# Patient Record
Sex: MALE | Race: BLACK OR AFRICAN AMERICAN | Employment: OTHER | URBAN - METROPOLITAN AREA
[De-identification: names, ages, dates, MRNs, and addresses within clinical notes are randomized per-mention and may not be internally consistent; named-entity substitution may affect disease eponyms.]

---

## 2018-12-03 ENCOUNTER — INITIAL CONSULT (OUTPATIENT)
Dept: PHYSICAL MEDICINE AND REHAB | Age: 73
End: 2018-12-03
Payer: MEDICARE

## 2018-12-03 VITALS
DIASTOLIC BLOOD PRESSURE: 68 MMHG | SYSTOLIC BLOOD PRESSURE: 147 MMHG | WEIGHT: 218 LBS | HEIGHT: 74 IN | HEART RATE: 71 BPM | BODY MASS INDEX: 27.98 KG/M2 | TEMPERATURE: 96.6 F

## 2018-12-03 DIAGNOSIS — S88.111A AMPUTATION OF RIGHT LOWER EXTREMITY BELOW KNEE (HCC): ICD-10-CM

## 2018-12-03 DIAGNOSIS — G54.6 PHANTOM LIMB PAIN (HCC): ICD-10-CM

## 2018-12-03 DIAGNOSIS — M79.604 PAIN OF RIGHT LOWER EXTREMITY: Primary | ICD-10-CM

## 2018-12-03 PROCEDURE — 99204 OFFICE O/P NEW MOD 45 MIN: CPT | Performed by: PHYSICAL MEDICINE & REHABILITATION

## 2018-12-03 RX ORDER — GABAPENTIN 300 MG/1
300 CAPSULE ORAL 3 TIMES DAILY
COMMUNITY

## 2018-12-03 RX ORDER — AMLODIPINE BESYLATE 5 MG/1
10 TABLET ORAL DAILY
COMMUNITY

## 2018-12-03 RX ORDER — ACETAMINOPHEN 500 MG
500 TABLET ORAL EVERY 6 HOURS PRN
Status: ON HOLD | COMMUNITY
End: 2020-01-01

## 2018-12-03 RX ORDER — ATORVASTATIN CALCIUM 20 MG/1
40 TABLET, FILM COATED ORAL DAILY
Status: ON HOLD | COMMUNITY
End: 2020-01-01

## 2018-12-03 RX ORDER — COLCHICINE 0.6 MG/1
0.6 TABLET ORAL DAILY
COMMUNITY

## 2018-12-03 RX ORDER — DORZOLAMIDE HYDROCHLORIDE AND TIMOLOL MALEATE 20; 5 MG/ML; MG/ML
SOLUTION/ DROPS OPHTHALMIC
Status: ON HOLD | COMMUNITY
Start: 2018-09-03 | End: 2020-01-01

## 2018-12-03 RX ORDER — BUMETANIDE 1 MG/1
2 TABLET ORAL 2 TIMES DAILY
COMMUNITY

## 2018-12-03 RX ORDER — BISACODYL 10 MG
10 SUPPOSITORY, RECTAL RECTAL DAILY
COMMUNITY

## 2020-01-01 ENCOUNTER — APPOINTMENT (OUTPATIENT)
Dept: CT IMAGING | Age: 75
DRG: 270 | End: 2020-01-01
Payer: MEDICARE

## 2020-01-01 ENCOUNTER — APPOINTMENT (OUTPATIENT)
Dept: ULTRASOUND IMAGING | Age: 75
DRG: 270 | End: 2020-01-01
Payer: MEDICARE

## 2020-01-01 ENCOUNTER — HOSPITAL ENCOUNTER (INPATIENT)
Age: 75
LOS: 9 days | DRG: 270 | End: 2020-11-02
Attending: EMERGENCY MEDICINE | Admitting: INTERNAL MEDICINE
Payer: MEDICARE

## 2020-01-01 ENCOUNTER — APPOINTMENT (OUTPATIENT)
Dept: GENERAL RADIOLOGY | Facility: CLINIC | Age: 75
End: 2020-01-01
Payer: MEDICARE

## 2020-01-01 ENCOUNTER — APPOINTMENT (OUTPATIENT)
Dept: CARDIAC CATH/INVASIVE PROCEDURES | Age: 75
DRG: 270 | End: 2020-01-01
Payer: MEDICARE

## 2020-01-01 ENCOUNTER — APPOINTMENT (OUTPATIENT)
Dept: GENERAL RADIOLOGY | Age: 75
DRG: 270 | End: 2020-01-01
Payer: MEDICARE

## 2020-01-01 ENCOUNTER — HOSPITAL ENCOUNTER (EMERGENCY)
Facility: CLINIC | Age: 75
Discharge: ANOTHER HOSPITAL | End: 2020-10-24
Attending: EMERGENCY MEDICINE
Payer: MEDICARE

## 2020-01-01 VITALS
OXYGEN SATURATION: 92 % | WEIGHT: 224 LBS | RESPIRATION RATE: 14 BRPM | TEMPERATURE: 98.1 F | SYSTOLIC BLOOD PRESSURE: 157 MMHG | DIASTOLIC BLOOD PRESSURE: 64 MMHG | BODY MASS INDEX: 29.15 KG/M2 | HEART RATE: 74 BPM

## 2020-01-01 LAB
-: ABNORMAL
-: ABNORMAL
-: NORMAL
ABSOLUTE EOS #: 0 K/UL (ref 0–0.4)
ABSOLUTE EOS #: 0.04 K/UL (ref 0–0.4)
ABSOLUTE EOS #: 0.07 K/UL (ref 0–0.44)
ABSOLUTE EOS #: 0.11 K/UL (ref 0–0.44)
ABSOLUTE EOS #: 0.13 K/UL (ref 0–0.4)
ABSOLUTE EOS #: 0.13 K/UL (ref 0–0.44)
ABSOLUTE EOS #: 0.17 K/UL (ref 0–0.44)
ABSOLUTE EOS #: 0.18 K/UL (ref 0–0.4)
ABSOLUTE IMMATURE GRANULOCYTE: 0 K/UL (ref 0–0.3)
ABSOLUTE IMMATURE GRANULOCYTE: 0.04 K/UL (ref 0–0.3)
ABSOLUTE IMMATURE GRANULOCYTE: <0.03 K/UL (ref 0–0.3)
ABSOLUTE IMMATURE GRANULOCYTE: ABNORMAL K/UL (ref 0–0.3)
ABSOLUTE LYMPH #: 0.22 K/UL (ref 1–4.8)
ABSOLUTE LYMPH #: 0.33 K/UL (ref 1–4.8)
ABSOLUTE LYMPH #: 0.43 K/UL (ref 1–4.8)
ABSOLUTE LYMPH #: 0.52 K/UL (ref 1–4.8)
ABSOLUTE LYMPH #: 0.56 K/UL (ref 1.1–3.7)
ABSOLUTE LYMPH #: 0.56 K/UL (ref 1–4.8)
ABSOLUTE LYMPH #: 0.69 K/UL (ref 1.1–3.7)
ABSOLUTE LYMPH #: 0.71 K/UL (ref 1.1–3.7)
ABSOLUTE LYMPH #: 0.77 K/UL (ref 1.1–3.7)
ABSOLUTE LYMPH #: 0.84 K/UL (ref 1–4.8)
ABSOLUTE MONO #: 0.18 K/UL (ref 0.1–0.8)
ABSOLUTE MONO #: 0.2 K/UL (ref 0.1–0.8)
ABSOLUTE MONO #: 0.32 K/UL (ref 0.1–1.2)
ABSOLUTE MONO #: 0.33 K/UL (ref 0.1–0.8)
ABSOLUTE MONO #: 0.34 K/UL (ref 0.1–0.8)
ABSOLUTE MONO #: 0.36 K/UL (ref 0.1–0.8)
ABSOLUTE MONO #: 0.41 K/UL (ref 0.1–1.2)
ABSOLUTE MONO #: 0.43 K/UL (ref 0.1–1.2)
ABSOLUTE MONO #: 0.52 K/UL (ref 0.1–0.8)
ABSOLUTE MONO #: 0.56 K/UL (ref 0.1–1.2)
ACTION: NORMAL
ALBUMIN SERPL-MCNC: 3.4 G/DL (ref 3.5–5.2)
ALBUMIN/GLOBULIN RATIO: 0.9 (ref 1–2.5)
ALLEN TEST: ABNORMAL
ALP BLD-CCNC: 109 U/L (ref 40–129)
ALT SERPL-CCNC: 22 U/L (ref 5–41)
AMORPHOUS: ABNORMAL
AMORPHOUS: ABNORMAL
ANION GAP SERPL CALCULATED.3IONS-SCNC: 10 MMOL/L (ref 9–17)
ANION GAP SERPL CALCULATED.3IONS-SCNC: 10 MMOL/L (ref 9–17)
ANION GAP SERPL CALCULATED.3IONS-SCNC: 12 MMOL/L (ref 9–17)
ANION GAP SERPL CALCULATED.3IONS-SCNC: 13 MMOL/L (ref 9–17)
ANION GAP SERPL CALCULATED.3IONS-SCNC: 14 MMOL/L (ref 9–17)
ANION GAP SERPL CALCULATED.3IONS-SCNC: 5 MMOL/L (ref 9–17)
ANION GAP SERPL CALCULATED.3IONS-SCNC: 7 MMOL/L (ref 9–17)
ANION GAP SERPL CALCULATED.3IONS-SCNC: 7 MMOL/L (ref 9–17)
ANION GAP SERPL CALCULATED.3IONS-SCNC: 8 MMOL/L (ref 9–17)
ANION GAP SERPL CALCULATED.3IONS-SCNC: 9 MMOL/L (ref 9–17)
ANION GAP SERPL CALCULATED.3IONS-SCNC: 9 MMOL/L (ref 9–17)
ANION GAP: 16 MMOL/L (ref 7–16)
ANION GAP: 7 MMOL/L (ref 7–16)
ANION GAP: 9 MMOL/L (ref 7–16)
APPEARANCE FLUID: NORMAL
AST SERPL-CCNC: 14 U/L
BACTERIA: ABNORMAL
BACTERIA: ABNORMAL
BASO FLUID: NORMAL %
BASOPHILS # BLD: 0 % (ref 0–2)
BASOPHILS # BLD: 1 % (ref 0–2)
BASOPHILS # BLD: 1 % (ref 0–2)
BASOPHILS ABSOLUTE: 0 K/UL (ref 0–0.2)
BASOPHILS ABSOLUTE: 0.04 K/UL (ref 0–0.2)
BASOPHILS ABSOLUTE: 0.06 K/UL (ref 0–0.2)
BASOPHILS ABSOLUTE: <0.03 K/UL (ref 0–0.2)
BASOPHILS ABSOLUTE: <0.03 K/UL (ref 0–0.2)
BILIRUB SERPL-MCNC: 0.8 MG/DL (ref 0.3–1.2)
BILIRUBIN URINE: NEGATIVE
BILIRUBIN URINE: NEGATIVE
BNP INTERPRETATION: ABNORMAL
BUN BLDV-MCNC: 42 MG/DL (ref 8–23)
BUN BLDV-MCNC: 43 MG/DL (ref 8–23)
BUN BLDV-MCNC: 46 MG/DL (ref 8–23)
BUN BLDV-MCNC: 46 MG/DL (ref 8–23)
BUN BLDV-MCNC: 47 MG/DL (ref 8–23)
BUN BLDV-MCNC: 48 MG/DL (ref 8–23)
BUN BLDV-MCNC: 49 MG/DL (ref 8–23)
BUN BLDV-MCNC: 52 MG/DL (ref 8–23)
BUN BLDV-MCNC: 54 MG/DL (ref 8–23)
BUN BLDV-MCNC: 55 MG/DL (ref 8–23)
BUN BLDV-MCNC: 57 MG/DL (ref 8–23)
BUN BLDV-MCNC: 60 MG/DL (ref 8–23)
BUN BLDV-MCNC: 63 MG/DL (ref 8–23)
BUN BLDV-MCNC: 63 MG/DL (ref 8–23)
BUN/CREAT BLD: ABNORMAL (ref 9–20)
CALCIUM IONIZED: 1.25 MMOL/L (ref 1.13–1.33)
CALCIUM SERPL-MCNC: 10 MG/DL (ref 8.6–10.4)
CALCIUM SERPL-MCNC: 9.1 MG/DL (ref 8.6–10.4)
CALCIUM SERPL-MCNC: 9.1 MG/DL (ref 8.6–10.4)
CALCIUM SERPL-MCNC: 9.2 MG/DL (ref 8.6–10.4)
CALCIUM SERPL-MCNC: 9.2 MG/DL (ref 8.6–10.4)
CALCIUM SERPL-MCNC: 9.3 MG/DL (ref 8.6–10.4)
CALCIUM SERPL-MCNC: 9.5 MG/DL (ref 8.6–10.4)
CALCIUM SERPL-MCNC: 9.6 MG/DL (ref 8.6–10.4)
CALCIUM SERPL-MCNC: 9.8 MG/DL (ref 8.6–10.4)
CARBOXYHEMOGLOBIN: 0.9 % (ref 0–5)
CARBOXYHEMOGLOBIN: 1.4 % (ref 0–5)
CARBOXYHEMOGLOBIN: 1.5 % (ref 0–5)
CASE NUMBER:: NORMAL
CASTS UA: ABNORMAL /LPF (ref 0–2)
CASTS UA: ABNORMAL /LPF (ref 0–2)
CASTS UA: ABNORMAL /LPF (ref 0–8)
CHLORIDE BLD-SCNC: 102 MMOL/L (ref 98–107)
CHLORIDE BLD-SCNC: 103 MMOL/L (ref 98–107)
CHLORIDE BLD-SCNC: 103 MMOL/L (ref 98–107)
CHLORIDE BLD-SCNC: 104 MMOL/L (ref 98–107)
CHLORIDE BLD-SCNC: 105 MMOL/L (ref 98–107)
CHLORIDE BLD-SCNC: 107 MMOL/L (ref 98–107)
CHLORIDE BLD-SCNC: 107 MMOL/L (ref 98–107)
CHLORIDE BLD-SCNC: 110 MMOL/L (ref 98–107)
CHLORIDE BLD-SCNC: 111 MMOL/L (ref 98–107)
CHLORIDE BLD-SCNC: 99 MMOL/L (ref 98–107)
CO2: 22 MMOL/L (ref 20–31)
CO2: 23 MMOL/L (ref 20–31)
CO2: 25 MMOL/L (ref 20–31)
CO2: 26 MMOL/L (ref 20–31)
CO2: 27 MMOL/L (ref 20–31)
CO2: 28 MMOL/L (ref 20–31)
CO2: 28 MMOL/L (ref 20–31)
CO2: 29 MMOL/L (ref 20–31)
CO2: 31 MMOL/L (ref 20–31)
CO2: 31 MMOL/L (ref 20–31)
COLOR FLUID: NORMAL
COLOR: ABNORMAL
COLOR: YELLOW
COMMENT UA: ABNORMAL
COMPLEMENT C3: 91 MG/DL (ref 90–180)
COMPLEMENT C4: 25 MG/DL (ref 10–40)
CREAT SERPL-MCNC: 1.2 MG/DL (ref 0.7–1.2)
CREAT SERPL-MCNC: 1.29 MG/DL (ref 0.7–1.2)
CREAT SERPL-MCNC: 1.44 MG/DL (ref 0.7–1.2)
CREAT SERPL-MCNC: 1.48 MG/DL (ref 0.7–1.2)
CREAT SERPL-MCNC: 1.5 MG/DL (ref 0.7–1.2)
CREAT SERPL-MCNC: 1.53 MG/DL (ref 0.7–1.2)
CREAT SERPL-MCNC: 1.58 MG/DL (ref 0.7–1.2)
CREAT SERPL-MCNC: 1.78 MG/DL (ref 0.7–1.2)
CREAT SERPL-MCNC: 1.8 MG/DL (ref 0.7–1.2)
CREAT SERPL-MCNC: 1.85 MG/DL (ref 0.7–1.2)
CREAT SERPL-MCNC: 1.9 MG/DL (ref 0.7–1.2)
CREAT SERPL-MCNC: 2.08 MG/DL (ref 0.7–1.2)
CREAT SERPL-MCNC: 2.12 MG/DL (ref 0.7–1.2)
CREAT SERPL-MCNC: 2.29 MG/DL (ref 0.7–1.2)
CREATININE URINE: 24.3 MG/DL (ref 39–259)
CRYSTALS, UA: ABNORMAL /HPF
CRYSTALS, UA: ABNORMAL /HPF
CULTURE: ABNORMAL
CULTURE: NORMAL
CULTURE: NORMAL
DATE AND TIME: NORMAL
DIFFERENTIAL TYPE: ABNORMAL
DIRECT EXAM: NORMAL
EKG ATRIAL RATE: 214 BPM
EKG ATRIAL RATE: 216 BPM
EKG P AXIS: -14 DEGREES
EKG P AXIS: -76 DEGREES
EKG Q-T INTERVAL: 394 MS
EKG Q-T INTERVAL: 436 MS
EKG QRS DURATION: 106 MS
EKG QRS DURATION: 110 MS
EKG QTC CALCULATION (BAZETT): 431 MS
EKG QTC CALCULATION (BAZETT): 467 MS
EKG R AXIS: -14 DEGREES
EKG R AXIS: -16 DEGREES
EKG T AXIS: 85 DEGREES
EKG T AXIS: 96 DEGREES
EKG VENTRICULAR RATE: 69 BPM
EKG VENTRICULAR RATE: 72 BPM
EOSINOPHIL FLUID: NORMAL %
EOSINOPHILS RELATIVE PERCENT: 0 % (ref 1–4)
EOSINOPHILS RELATIVE PERCENT: 1 % (ref 1–4)
EOSINOPHILS RELATIVE PERCENT: 1 % (ref 1–4)
EOSINOPHILS RELATIVE PERCENT: 3 % (ref 1–4)
EOSINOPHILS RELATIVE PERCENT: 4 % (ref 1–4)
EPITHELIAL CELLS UA: ABNORMAL /HPF (ref 0–5)
EPITHELIAL CELLS UA: ABNORMAL /HPF (ref 0–5)
FIO2: 100
FIO2: 100
FIO2: 80
FIO2: ABNORMAL
FLUID DIFF COMMENT: NORMAL
GFR AFRICAN AMERICAN: 34 ML/MIN
GFR AFRICAN AMERICAN: 37 ML/MIN
GFR AFRICAN AMERICAN: 38 ML/MIN
GFR AFRICAN AMERICAN: 42 ML/MIN
GFR AFRICAN AMERICAN: 43 ML/MIN
GFR AFRICAN AMERICAN: 45 ML/MIN
GFR AFRICAN AMERICAN: 45 ML/MIN
GFR AFRICAN AMERICAN: 52 ML/MIN
GFR AFRICAN AMERICAN: 54 ML/MIN
GFR AFRICAN AMERICAN: 55 ML/MIN
GFR AFRICAN AMERICAN: 56 ML/MIN
GFR AFRICAN AMERICAN: 58 ML/MIN
GFR AFRICAN AMERICAN: >60 ML/MIN
GFR AFRICAN AMERICAN: >60 ML/MIN
GFR NON-AFRICAN AMERICAN: 26 ML/MIN
GFR NON-AFRICAN AMERICAN: 27 ML/MIN
GFR NON-AFRICAN AMERICAN: 28 ML/MIN
GFR NON-AFRICAN AMERICAN: 29 ML/MIN
GFR NON-AFRICAN AMERICAN: 30 ML/MIN
GFR NON-AFRICAN AMERICAN: 31 ML/MIN
GFR NON-AFRICAN AMERICAN: 35 ML/MIN
GFR NON-AFRICAN AMERICAN: 36 ML/MIN
GFR NON-AFRICAN AMERICAN: 37 ML/MIN
GFR NON-AFRICAN AMERICAN: 37 ML/MIN
GFR NON-AFRICAN AMERICAN: 43 ML/MIN
GFR NON-AFRICAN AMERICAN: 44 ML/MIN
GFR NON-AFRICAN AMERICAN: 45 ML/MIN
GFR NON-AFRICAN AMERICAN: 46 ML/MIN
GFR NON-AFRICAN AMERICAN: 48 ML/MIN
GFR NON-AFRICAN AMERICAN: 54 ML/MIN
GFR NON-AFRICAN AMERICAN: 59 ML/MIN
GFR SERPL CREATININE-BSD FRML MDRD: 31 ML/MIN
GFR SERPL CREATININE-BSD FRML MDRD: 33 ML/MIN
GFR SERPL CREATININE-BSD FRML MDRD: 35 ML/MIN
GFR SERPL CREATININE-BSD FRML MDRD: ABNORMAL ML/MIN/{1.73_M2}
GLUCOSE BLD-MCNC: 101 MG/DL (ref 75–110)
GLUCOSE BLD-MCNC: 107 MG/DL (ref 70–99)
GLUCOSE BLD-MCNC: 107 MG/DL (ref 75–110)
GLUCOSE BLD-MCNC: 109 MG/DL (ref 75–110)
GLUCOSE BLD-MCNC: 113 MG/DL (ref 70–99)
GLUCOSE BLD-MCNC: 118 MG/DL (ref 70–99)
GLUCOSE BLD-MCNC: 119 MG/DL (ref 75–110)
GLUCOSE BLD-MCNC: 120 MG/DL (ref 75–110)
GLUCOSE BLD-MCNC: 130 MG/DL (ref 70–99)
GLUCOSE BLD-MCNC: 131 MG/DL (ref 75–110)
GLUCOSE BLD-MCNC: 134 MG/DL (ref 75–110)
GLUCOSE BLD-MCNC: 135 MG/DL (ref 75–110)
GLUCOSE BLD-MCNC: 135 MG/DL (ref 75–110)
GLUCOSE BLD-MCNC: 137 MG/DL (ref 75–110)
GLUCOSE BLD-MCNC: 138 MG/DL (ref 75–110)
GLUCOSE BLD-MCNC: 139 MG/DL (ref 75–110)
GLUCOSE BLD-MCNC: 145 MG/DL (ref 75–110)
GLUCOSE BLD-MCNC: 147 MG/DL (ref 70–99)
GLUCOSE BLD-MCNC: 150 MG/DL (ref 70–99)
GLUCOSE BLD-MCNC: 162 MG/DL (ref 75–110)
GLUCOSE BLD-MCNC: 163 MG/DL (ref 75–110)
GLUCOSE BLD-MCNC: 170 MG/DL (ref 75–110)
GLUCOSE BLD-MCNC: 180 MG/DL (ref 75–110)
GLUCOSE BLD-MCNC: 181 MG/DL (ref 75–110)
GLUCOSE BLD-MCNC: 182 MG/DL (ref 75–110)
GLUCOSE BLD-MCNC: 187 MG/DL (ref 75–110)
GLUCOSE BLD-MCNC: 187 MG/DL (ref 75–110)
GLUCOSE BLD-MCNC: 189 MG/DL (ref 75–110)
GLUCOSE BLD-MCNC: 191 MG/DL (ref 70–99)
GLUCOSE BLD-MCNC: 198 MG/DL (ref 75–110)
GLUCOSE BLD-MCNC: 198 MG/DL (ref 75–110)
GLUCOSE BLD-MCNC: 199 MG/DL (ref 70–99)
GLUCOSE BLD-MCNC: 201 MG/DL (ref 75–110)
GLUCOSE BLD-MCNC: 206 MG/DL (ref 70–99)
GLUCOSE BLD-MCNC: 207 MG/DL (ref 75–110)
GLUCOSE BLD-MCNC: 210 MG/DL (ref 74–100)
GLUCOSE BLD-MCNC: 211 MG/DL (ref 75–110)
GLUCOSE BLD-MCNC: 221 MG/DL (ref 75–110)
GLUCOSE BLD-MCNC: 229 MG/DL (ref 75–110)
GLUCOSE BLD-MCNC: 229 MG/DL (ref 75–110)
GLUCOSE BLD-MCNC: 235 MG/DL (ref 75–110)
GLUCOSE BLD-MCNC: 236 MG/DL (ref 70–99)
GLUCOSE BLD-MCNC: 242 MG/DL (ref 75–110)
GLUCOSE BLD-MCNC: 252 MG/DL (ref 75–110)
GLUCOSE BLD-MCNC: 264 MG/DL (ref 75–110)
GLUCOSE BLD-MCNC: 281 MG/DL (ref 75–110)
GLUCOSE BLD-MCNC: 289 MG/DL (ref 74–100)
GLUCOSE BLD-MCNC: 290 MG/DL (ref 70–99)
GLUCOSE BLD-MCNC: 301 MG/DL (ref 70–99)
GLUCOSE BLD-MCNC: 307 MG/DL (ref 74–100)
GLUCOSE BLD-MCNC: 67 MG/DL (ref 75–110)
GLUCOSE BLD-MCNC: 70 MG/DL (ref 75–110)
GLUCOSE BLD-MCNC: 71 MG/DL (ref 70–99)
GLUCOSE BLD-MCNC: 73 MG/DL (ref 75–110)
GLUCOSE BLD-MCNC: 82 MG/DL (ref 75–110)
GLUCOSE BLD-MCNC: 84 MG/DL (ref 70–99)
GLUCOSE BLD-MCNC: 87 MG/DL (ref 75–110)
GLUCOSE BLD-MCNC: 93 MG/DL (ref 75–110)
GLUCOSE BLD-MCNC: 94 MG/DL (ref 75–110)
GLUCOSE URINE: ABNORMAL
GLUCOSE URINE: NEGATIVE
GLUCOSE, FLUID: 161 MG/DL
HCO3 VENOUS: 27.1 MMOL/L (ref 24–30)
HCO3 VENOUS: 28.9 MMOL/L (ref 24–30)
HCO3 VENOUS: 30.2 MMOL/L (ref 24–30)
HCT VFR BLD CALC: 26.9 % (ref 40.7–50.3)
HCT VFR BLD CALC: 27.6 % (ref 40.7–50.3)
HCT VFR BLD CALC: 27.9 % (ref 40.7–50.3)
HCT VFR BLD CALC: 27.9 % (ref 40.7–50.3)
HCT VFR BLD CALC: 28 % (ref 40.7–50.3)
HCT VFR BLD CALC: 29.2 % (ref 40.7–50.3)
HCT VFR BLD CALC: 29.4 % (ref 40.7–50.3)
HCT VFR BLD CALC: 29.4 % (ref 41–53)
HCT VFR BLD CALC: 29.9 % (ref 40.7–50.3)
HCT VFR BLD CALC: 30.4 % (ref 40.7–50.3)
HCT VFR BLD CALC: 31 % (ref 40.7–50.3)
HCT VFR BLD CALC: 33.6 % (ref 40.7–50.3)
HEMOGLOBIN: 8.2 G/DL (ref 13–17)
HEMOGLOBIN: 8.3 G/DL (ref 13–17)
HEMOGLOBIN: 8.3 G/DL (ref 13–17)
HEMOGLOBIN: 8.4 G/DL (ref 13–17)
HEMOGLOBIN: 8.5 G/DL (ref 13–17)
HEMOGLOBIN: 8.6 G/DL (ref 13–17)
HEMOGLOBIN: 8.8 G/DL (ref 13.5–17.5)
HEMOGLOBIN: 9.2 G/DL (ref 13–17)
IMMATURE GRANULOCYTES: 0 %
IMMATURE GRANULOCYTES: 1 %
IMMATURE GRANULOCYTES: ABNORMAL %
INR BLD: 1.3
KETONES, URINE: NEGATIVE
KETONES, URINE: NEGATIVE
LACTATE DEHYDROGENASE, FLUID: 44 U/L
LACTIC ACID: 0.6 MMOL/L (ref 0.5–2.2)
LEUKOCYTE ESTERASE, URINE: ABNORMAL
LEUKOCYTE ESTERASE, URINE: ABNORMAL
LV EF: 58 %
LV EF: 65 %
LVEF MODALITY: NORMAL
LVEF MODALITY: NORMAL
LYMPHOCYTES # BLD: 10 % (ref 24–44)
LYMPHOCYTES # BLD: 11 % (ref 24–44)
LYMPHOCYTES # BLD: 13 % (ref 24–44)
LYMPHOCYTES # BLD: 14 % (ref 24–44)
LYMPHOCYTES # BLD: 15 % (ref 24–43)
LYMPHOCYTES # BLD: 16 % (ref 24–43)
LYMPHOCYTES # BLD: 5 % (ref 24–44)
LYMPHOCYTES # BLD: 5 % (ref 24–44)
LYMPHOCYTES, BODY FLUID: 34 %
Lab: ABNORMAL
Lab: NORMAL
MAGNESIUM: 2.1 MG/DL (ref 1.6–2.6)
MAGNESIUM: 2.3 MG/DL (ref 1.6–2.6)
MCH RBC QN AUTO: 28.5 PG (ref 25.2–33.5)
MCH RBC QN AUTO: 28.7 PG (ref 25.2–33.5)
MCH RBC QN AUTO: 28.8 PG (ref 25.2–33.5)
MCH RBC QN AUTO: 28.8 PG (ref 25.2–33.5)
MCH RBC QN AUTO: 28.9 PG (ref 26–34)
MCH RBC QN AUTO: 29.1 PG (ref 25.2–33.5)
MCH RBC QN AUTO: 29.2 PG (ref 25.2–33.5)
MCH RBC QN AUTO: 29.5 PG (ref 25.2–33.5)
MCH RBC QN AUTO: 30 PG (ref 25.2–33.5)
MCHC RBC AUTO-ENTMCNC: 27.1 G/DL (ref 28.4–34.8)
MCHC RBC AUTO-ENTMCNC: 27.4 G/DL (ref 28.4–34.8)
MCHC RBC AUTO-ENTMCNC: 27.6 G/DL (ref 28.4–34.8)
MCHC RBC AUTO-ENTMCNC: 28.4 G/DL (ref 28.4–34.8)
MCHC RBC AUTO-ENTMCNC: 28.4 G/DL (ref 28.4–34.8)
MCHC RBC AUTO-ENTMCNC: 29.3 G/DL (ref 28.4–34.8)
MCHC RBC AUTO-ENTMCNC: 29.4 G/DL (ref 28.4–34.8)
MCHC RBC AUTO-ENTMCNC: 29.4 G/DL (ref 28.4–34.8)
MCHC RBC AUTO-ENTMCNC: 29.7 G/DL (ref 28.4–34.8)
MCHC RBC AUTO-ENTMCNC: 29.8 G/DL (ref 31–37)
MCHC RBC AUTO-ENTMCNC: 30 G/DL (ref 28.4–34.8)
MCHC RBC AUTO-ENTMCNC: 30.9 G/DL (ref 28.4–34.8)
MCV RBC AUTO: 101 FL (ref 82.6–102.9)
MCV RBC AUTO: 101.4 FL (ref 82.6–102.9)
MCV RBC AUTO: 102.4 FL (ref 82.6–102.9)
MCV RBC AUTO: 104 FL (ref 82.6–102.9)
MCV RBC AUTO: 105.2 FL (ref 82.6–102.9)
MCV RBC AUTO: 107.3 FL (ref 82.6–102.9)
MCV RBC AUTO: 95.7 FL (ref 82.6–102.9)
MCV RBC AUTO: 97 FL (ref 80–100)
MCV RBC AUTO: 97.2 FL (ref 82.6–102.9)
MCV RBC AUTO: 97.9 FL (ref 82.6–102.9)
MCV RBC AUTO: 97.9 FL (ref 82.6–102.9)
MCV RBC AUTO: 98.9 FL (ref 82.6–102.9)
METHEMOGLOBIN: ABNORMAL % (ref 0–1.5)
MODE: ABNORMAL
MONOCYTE, FLUID: NORMAL %
MONOCYTES # BLD: 10 % (ref 3–12)
MONOCYTES # BLD: 11 % (ref 3–12)
MONOCYTES # BLD: 11 % (ref 3–12)
MONOCYTES # BLD: 12 % (ref 1–7)
MONOCYTES # BLD: 3 % (ref 1–7)
MONOCYTES # BLD: 4 % (ref 1–7)
MONOCYTES # BLD: 6 % (ref 1–7)
MONOCYTES # BLD: 7 % (ref 1–7)
MONOCYTES # BLD: 7 % (ref 3–12)
MONOCYTES # BLD: 8 % (ref 1–7)
MORPHOLOGY: ABNORMAL
MUCUS: ABNORMAL
MUCUS: ABNORMAL
NEGATIVE BASE EXCESS, ART: 10 (ref 0–2)
NEGATIVE BASE EXCESS, ART: ABNORMAL (ref 0–2)
NEGATIVE BASE EXCESS, ART: ABNORMAL (ref 0–2)
NEGATIVE BASE EXCESS, VEN: ABNORMAL MMOL/L (ref 0–2)
NEUTROPHIL, FLUID: 13 %
NITRITE, URINE: NEGATIVE
NITRITE, URINE: NEGATIVE
NOTIFICATION TIME: ABNORMAL
NOTIFICATION: ABNORMAL
NOTIFY: NORMAL
NRBC AUTOMATED: 0 PER 100 WBC
NRBC AUTOMATED: 0.1 PER 100 WBC
NRBC AUTOMATED: ABNORMAL PER 100 WBC
NUCLEATED RED BLOOD CELLS: 1 PER 100 WBC
NUCLEATED RED BLOOD CELLS: 1 PER 100 WBC
O2 DEVICE/FLOW/%: ABNORMAL
O2 SAT, VEN: 69.7 % (ref 60–85)
O2 SAT, VEN: 83.7 % (ref 60–85)
O2 SAT, VEN: 98.2 % (ref 60–85)
OTHER CELLS FLUID: NORMAL %
OTHER OBSERVATIONS UA: ABNORMAL
OTHER OBSERVATIONS UA: ABNORMAL
OXYHEMOGLOBIN: ABNORMAL % (ref 95–98)
PARTIAL THROMBOPLASTIN TIME: 104.7 SEC (ref 20.5–30.5)
PARTIAL THROMBOPLASTIN TIME: 118.4 SEC (ref 20.5–30.5)
PARTIAL THROMBOPLASTIN TIME: 30.1 SEC (ref 20.5–30.5)
PARTIAL THROMBOPLASTIN TIME: 35.5 SEC (ref 20.5–30.5)
PARTIAL THROMBOPLASTIN TIME: 39.5 SEC (ref 20.5–30.5)
PARTIAL THROMBOPLASTIN TIME: 40.7 SEC (ref 20.5–30.5)
PARTIAL THROMBOPLASTIN TIME: 41.6 SEC (ref 20.5–30.5)
PARTIAL THROMBOPLASTIN TIME: 42.9 SEC (ref 20.5–30.5)
PARTIAL THROMBOPLASTIN TIME: 44.3 SEC (ref 20.5–30.5)
PARTIAL THROMBOPLASTIN TIME: 46.3 SEC (ref 20.5–30.5)
PARTIAL THROMBOPLASTIN TIME: 48.1 SEC (ref 20.5–30.5)
PARTIAL THROMBOPLASTIN TIME: 49.5 SEC (ref 20.5–30.5)
PARTIAL THROMBOPLASTIN TIME: 54.3 SEC (ref 20.5–30.5)
PARTIAL THROMBOPLASTIN TIME: 54.3 SEC (ref 20.5–30.5)
PARTIAL THROMBOPLASTIN TIME: 57.2 SEC (ref 20.5–30.5)
PARTIAL THROMBOPLASTIN TIME: 58.8 SEC (ref 20.5–30.5)
PARTIAL THROMBOPLASTIN TIME: 59.4 SEC (ref 20.5–30.5)
PARTIAL THROMBOPLASTIN TIME: 60.8 SEC (ref 20.5–30.5)
PARTIAL THROMBOPLASTIN TIME: 61.4 SEC (ref 20.5–30.5)
PARTIAL THROMBOPLASTIN TIME: 63.2 SEC (ref 20.5–30.5)
PARTIAL THROMBOPLASTIN TIME: 70.5 SEC (ref 20.5–30.5)
PARTIAL THROMBOPLASTIN TIME: 72.8 SEC (ref 20.5–30.5)
PARTIAL THROMBOPLASTIN TIME: 77.4 SEC (ref 20.5–30.5)
PARTIAL THROMBOPLASTIN TIME: 85.9 SEC (ref 20.5–30.5)
PARTIAL THROMBOPLASTIN TIME: 94.8 SEC (ref 20.5–30.5)
PATIENT TEMP: 37
PATIENT TEMP: ABNORMAL
PCO2, VEN, TEMP ADJ: ABNORMAL MMHG (ref 39–55)
PCO2, VEN: 52.3 (ref 39–55)
PCO2, VEN: 63.3 (ref 39–55)
PCO2, VEN: 65.9 (ref 39–55)
PDW BLD-RTO: 17.5 % (ref 11.8–14.4)
PDW BLD-RTO: 17.6 % (ref 11.8–14.4)
PDW BLD-RTO: 17.8 % (ref 11.8–14.4)
PDW BLD-RTO: 17.8 % (ref 11.8–14.4)
PDW BLD-RTO: 18 % (ref 11.8–14.4)
PDW BLD-RTO: 18.3 % (ref 11.8–14.4)
PDW BLD-RTO: 18.5 % (ref 11.8–14.4)
PDW BLD-RTO: 18.7 % (ref 11.8–14.4)
PDW BLD-RTO: 19.5 % (ref 11.8–14.4)
PDW BLD-RTO: 20.2 % (ref 12.5–15.4)
PEEP/CPAP: ABNORMAL
PH FLUID: 8
PH UA: 6 (ref 5–8)
PH UA: 6.5 (ref 5–8)
PH VENOUS: 7.26 (ref 7.32–7.42)
PH VENOUS: 7.3 (ref 7.32–7.42)
PH VENOUS: 7.33 (ref 7.32–7.42)
PH, VEN, TEMP ADJ: ABNORMAL (ref 7.32–7.42)
PLATELET # BLD: 100 K/UL (ref 138–453)
PLATELET # BLD: 130 K/UL (ref 138–453)
PLATELET # BLD: 130 K/UL (ref 138–453)
PLATELET # BLD: 142 K/UL (ref 140–450)
PLATELET # BLD: 144 K/UL (ref 138–453)
PLATELET # BLD: 156 K/UL (ref 138–453)
PLATELET # BLD: ABNORMAL K/UL (ref 138–453)
PLATELET ESTIMATE: ABNORMAL
PLATELET, FLUORESCENCE: 106 K/UL (ref 138–453)
PLATELET, FLUORESCENCE: 108 K/UL (ref 138–453)
PLATELET, FLUORESCENCE: 131 K/UL (ref 138–453)
PLATELET, FLUORESCENCE: 133 K/UL (ref 138–453)
PLATELET, FLUORESCENCE: 150 K/UL (ref 138–453)
PLATELET, FLUORESCENCE: 83 K/UL (ref 138–453)
PLATELET, IMMATURE FRACTION: 3.9 % (ref 1.1–10.3)
PLATELET, IMMATURE FRACTION: 5.5 % (ref 1.1–10.3)
PLATELET, IMMATURE FRACTION: 5.6 % (ref 1.1–10.3)
PLATELET, IMMATURE FRACTION: 6.2 % (ref 1.1–10.3)
PLATELET, IMMATURE FRACTION: 6.3 % (ref 1.1–10.3)
PLATELET, IMMATURE FRACTION: 7.3 % (ref 1.1–10.3)
PMV BLD AUTO: 12.2 FL (ref 8.1–13.5)
PMV BLD AUTO: 12.2 FL (ref 8.1–13.5)
PMV BLD AUTO: 12.3 FL (ref 8.1–13.5)
PMV BLD AUTO: 12.7 FL (ref 8.1–13.5)
PMV BLD AUTO: 13.2 FL (ref 8.1–13.5)
PMV BLD AUTO: 9.8 FL (ref 6–12)
PMV BLD AUTO: ABNORMAL FL (ref 8.1–13.5)
PO2, VEN, TEMP ADJ: ABNORMAL MMHG (ref 30–50)
PO2, VEN: 106 (ref 30–50)
PO2, VEN: 43.2 (ref 30–50)
PO2, VEN: 54.8 (ref 30–50)
POC CHLORIDE: 103 MMOL/L (ref 98–107)
POC CHLORIDE: 104 MMOL/L (ref 98–107)
POC CHLORIDE: 111 MMOL/L (ref 98–107)
POC CREATININE: 2.23 MG/DL (ref 0.51–1.19)
POC CREATININE: 2.35 MG/DL (ref 0.51–1.19)
POC CREATININE: 2.44 MG/DL (ref 0.51–1.19)
POC HCO3: 18.5 MMOL/L (ref 21–28)
POC HCO3: 24.6 MMOL/L (ref 21–28)
POC HCO3: 29.4 MMOL/L (ref 21–28)
POC HEMATOCRIT: 16 % (ref 41–53)
POC HEMATOCRIT: 27 % (ref 41–53)
POC HEMATOCRIT: 28 % (ref 41–53)
POC HEMOGLOBIN: 5.4 G/DL (ref 13.5–17.5)
POC HEMOGLOBIN: 9 G/DL (ref 13.5–17.5)
POC HEMOGLOBIN: 9.5 G/DL (ref 13.5–17.5)
POC IONIZED CALCIUM: 1.26 MMOL/L (ref 1.15–1.33)
POC IONIZED CALCIUM: 1.26 MMOL/L (ref 1.15–1.33)
POC IONIZED CALCIUM: 1.33 MMOL/L (ref 1.15–1.33)
POC LACTIC ACID: 1.43 MMOL/L (ref 0.56–1.39)
POC LACTIC ACID: 2.93 MMOL/L (ref 0.56–1.39)
POC LACTIC ACID: 9.08 MMOL/L (ref 0.56–1.39)
POC O2 SATURATION: 100 % (ref 94–98)
POC O2 SATURATION: 98 % (ref 94–98)
POC O2 SATURATION: 99 % (ref 94–98)
POC PCO2 TEMP: ABNORMAL MM HG
POC PCO2: 35.1 MM HG (ref 35–48)
POC PCO2: 43.2 MM HG (ref 35–48)
POC PCO2: 59.2 MM HG (ref 35–48)
POC PH TEMP: ABNORMAL
POC PH: 7.1 (ref 7.35–7.45)
POC PH: 7.44 (ref 7.35–7.45)
POC PH: 7.45 (ref 7.35–7.45)
POC PO2 TEMP: ABNORMAL MM HG
POC PO2: 108.2 MM HG (ref 83–108)
POC PO2: 174.2 MM HG (ref 83–108)
POC PO2: 194.1 MM HG (ref 83–108)
POC POTASSIUM: 3 MMOL/L (ref 3.5–4.5)
POC POTASSIUM: 3.9 MMOL/L (ref 3.5–4.5)
POC POTASSIUM: 4.5 MMOL/L (ref 3.5–4.5)
POC SODIUM: 137 MMOL/L (ref 138–146)
POC SODIUM: 140 MMOL/L (ref 138–146)
POC SODIUM: 145 MMOL/L (ref 138–146)
POSITIVE BASE EXCESS, ART: 1 (ref 0–3)
POSITIVE BASE EXCESS, ART: 5 (ref 0–3)
POSITIVE BASE EXCESS, ART: ABNORMAL (ref 0–3)
POSITIVE BASE EXCESS, VEN: 1.4 MMOL/L (ref 0–2)
POSITIVE BASE EXCESS, VEN: 1.7 MMOL/L (ref 0–2)
POSITIVE BASE EXCESS, VEN: 3.4 MMOL/L (ref 0–2)
POTASSIUM SERPL-SCNC: 3.5 MMOL/L (ref 3.7–5.3)
POTASSIUM SERPL-SCNC: 3.6 MMOL/L (ref 3.7–5.3)
POTASSIUM SERPL-SCNC: 3.8 MMOL/L (ref 3.7–5.3)
POTASSIUM SERPL-SCNC: 3.8 MMOL/L (ref 3.7–5.3)
POTASSIUM SERPL-SCNC: 3.9 MMOL/L (ref 3.7–5.3)
POTASSIUM SERPL-SCNC: 3.9 MMOL/L (ref 3.7–5.3)
POTASSIUM SERPL-SCNC: 4 MMOL/L (ref 3.7–5.3)
POTASSIUM SERPL-SCNC: 4.2 MMOL/L (ref 3.7–5.3)
POTASSIUM SERPL-SCNC: 4.3 MMOL/L (ref 3.7–5.3)
POTASSIUM SERPL-SCNC: 4.4 MMOL/L (ref 3.7–5.3)
POTASSIUM SERPL-SCNC: 4.5 MMOL/L (ref 3.7–5.3)
POTASSIUM SERPL-SCNC: 4.6 MMOL/L (ref 3.7–5.3)
POTASSIUM SERPL-SCNC: 4.8 MMOL/L (ref 3.7–5.3)
PRO-BNP: 6604 PG/ML
PROGRAF: 4 NG/ML (ref 5–20)
PROGRAF: <2 NG/ML (ref 5–20)
PROGRAF: <2 NG/ML (ref 5–20)
PROTEIN UA: ABNORMAL
PROTEIN UA: ABNORMAL
PROTHROMBIN TIME: 13.2 SEC (ref 9.4–12.6)
PSV: ABNORMAL
PT. POSITION: ABNORMAL
RBC # BLD: 2.81 M/UL (ref 4.21–5.77)
RBC # BLD: 2.82 M/UL (ref 4.21–5.77)
RBC # BLD: 2.82 M/UL (ref 4.21–5.77)
RBC # BLD: 2.85 M/UL (ref 4.21–5.77)
RBC # BLD: 2.87 M/UL (ref 4.21–5.77)
RBC # BLD: 2.88 M/UL (ref 4.21–5.77)
RBC # BLD: 2.88 M/UL (ref 4.21–5.77)
RBC # BLD: 2.89 M/UL (ref 4.21–5.77)
RBC # BLD: 2.89 M/UL (ref 4.21–5.77)
RBC # BLD: 2.96 M/UL (ref 4.21–5.77)
RBC # BLD: 3.04 M/UL (ref 4.5–5.9)
RBC # BLD: 3.23 M/UL (ref 4.21–5.77)
RBC # BLD: ABNORMAL 10*6/UL
RBC FLUID: 6000 /MM3
RBC UA: ABNORMAL /HPF (ref 0–2)
RBC UA: ABNORMAL /HPF (ref 0–4)
READ BACK: YES
REASON FOR REJECTION: NORMAL
RENAL EPITHELIAL, UA: ABNORMAL /HPF
RENAL EPITHELIAL, UA: ABNORMAL /HPF
RESPIRATORY RATE: ABNORMAL
SAMPLE SITE: ABNORMAL
SARS-COV-2, RAPID: NOT DETECTED
SARS-COV-2: NORMAL
SARS-COV-2: NORMAL
SEG NEUTROPHILS: 68 % (ref 36–65)
SEG NEUTROPHILS: 70 % (ref 36–65)
SEG NEUTROPHILS: 70 % (ref 36–65)
SEG NEUTROPHILS: 72 % (ref 36–66)
SEG NEUTROPHILS: 76 % (ref 36–66)
SEG NEUTROPHILS: 77 % (ref 36–65)
SEG NEUTROPHILS: 81 % (ref 36–66)
SEG NEUTROPHILS: 82 % (ref 36–66)
SEG NEUTROPHILS: 91 % (ref 36–66)
SEG NEUTROPHILS: 92 % (ref 36–66)
SEGMENTED NEUTROPHILS ABSOLUTE COUNT: 2.58 K/UL (ref 1.5–8.1)
SEGMENTED NEUTROPHILS ABSOLUTE COUNT: 2.93 K/UL (ref 1.5–8.1)
SEGMENTED NEUTROPHILS ABSOLUTE COUNT: 3.09 K/UL (ref 1.8–7.7)
SEGMENTED NEUTROPHILS ABSOLUTE COUNT: 3.49 K/UL (ref 1.8–7.7)
SEGMENTED NEUTROPHILS ABSOLUTE COUNT: 3.53 K/UL (ref 1.5–8.1)
SEGMENTED NEUTROPHILS ABSOLUTE COUNT: 3.7 K/UL (ref 1.5–8.1)
SEGMENTED NEUTROPHILS ABSOLUTE COUNT: 3.85 K/UL (ref 1.8–7.7)
SEGMENTED NEUTROPHILS ABSOLUTE COUNT: 4 K/UL (ref 1.8–7.7)
SEGMENTED NEUTROPHILS ABSOLUTE COUNT: 4.56 K/UL (ref 1.8–7.7)
SEGMENTED NEUTROPHILS ABSOLUTE COUNT: 6.07 K/UL (ref 1.8–7.7)
SET RATE: ABNORMAL
SODIUM BLD-SCNC: 136 MMOL/L (ref 135–144)
SODIUM BLD-SCNC: 137 MMOL/L (ref 135–144)
SODIUM BLD-SCNC: 138 MMOL/L (ref 135–144)
SODIUM BLD-SCNC: 139 MMOL/L (ref 135–144)
SODIUM BLD-SCNC: 139 MMOL/L (ref 135–144)
SODIUM BLD-SCNC: 140 MMOL/L (ref 135–144)
SODIUM BLD-SCNC: 140 MMOL/L (ref 135–144)
SODIUM BLD-SCNC: 143 MMOL/L (ref 135–144)
SODIUM BLD-SCNC: 143 MMOL/L (ref 135–144)
SODIUM BLD-SCNC: 146 MMOL/L (ref 135–144)
SODIUM BLD-SCNC: 146 MMOL/L (ref 135–144)
SODIUM BLD-SCNC: 148 MMOL/L (ref 135–144)
SODIUM,UR: 85 MMOL/L
SOURCE: NORMAL
SPECIFIC GRAVITY UA: 1.01 (ref 1–1.03)
SPECIFIC GRAVITY UA: 1.02 (ref 1–1.03)
SPECIMEN DESCRIPTION: ABNORMAL
SPECIMEN DESCRIPTION: NORMAL
SPECIMEN TYPE: NORMAL
SURGICAL PATHOLOGY REPORT: NORMAL
TCO2 (CALC), ART: 20 MMOL/L (ref 22–29)
TCO2 (CALC), ART: 26 MMOL/L (ref 22–29)
TCO2 (CALC), ART: 31 MMOL/L (ref 22–29)
TEXT FOR RESPIRATORY: ABNORMAL
TOTAL HB: ABNORMAL G/DL (ref 12–16)
TOTAL PROTEIN, BODY FLUID: 1.8 G/DL
TOTAL PROTEIN, URINE: 79 MG/DL
TOTAL PROTEIN: 7.2 G/DL (ref 6.4–8.3)
TOTAL RATE: ABNORMAL
TRICHOMONAS: ABNORMAL
TRICHOMONAS: ABNORMAL
TROPONIN INTERP: ABNORMAL
TROPONIN T: ABNORMAL NG/ML
TROPONIN, HIGH SENSITIVITY: 199 NG/L (ref 0–22)
TROPONIN, HIGH SENSITIVITY: 209 NG/L (ref 0–22)
TROPONIN, HIGH SENSITIVITY: 221 NG/L (ref 0–22)
TROPONIN, HIGH SENSITIVITY: 230 NG/L (ref 0–22)
TROPONIN, HIGH SENSITIVITY: 237 NG/L (ref 0–22)
TROPONIN, HIGH SENSITIVITY: 247 NG/L (ref 0–22)
TROPONIN, HIGH SENSITIVITY: 320 NG/L (ref 0–22)
TURBIDITY: ABNORMAL
TURBIDITY: ABNORMAL
URINE HGB: ABNORMAL
URINE HGB: ABNORMAL
URINE TOTAL PROTEIN CREATININE RATIO: 3.25 (ref 0–0.2)
UROBILINOGEN, URINE: NORMAL
UROBILINOGEN, URINE: NORMAL
VT: ABNORMAL
WBC # BLD: 16.7 K/UL (ref 3.5–11.3)
WBC # BLD: 17.7 K/UL (ref 3.5–11.3)
WBC # BLD: 3.7 K/UL (ref 3.5–11.3)
WBC # BLD: 4.3 K/UL (ref 3.5–11.3)
WBC # BLD: 4.4 K/UL (ref 3.5–11.3)
WBC # BLD: 4.7 K/UL (ref 3.5–11.3)
WBC # BLD: 4.8 K/UL (ref 3.5–11.3)
WBC # BLD: 5 K/UL (ref 3.5–11.3)
WBC # BLD: 6 K/UL (ref 3.5–11.3)
WBC # BLD: 6.6 K/UL (ref 3.5–11)
WBC # BLD: ABNORMAL 10*3/UL
WBC FLUID: 176 /MM3
WBC UA: ABNORMAL /HPF (ref 0–5)
WBC UA: ABNORMAL /HPF (ref 0–5)
YEAST: ABNORMAL
YEAST: ABNORMAL
ZZ NTE CLEAN UP: ORDERED TEST: NORMAL
ZZ NTE WITH NAME CLEAN UP: SPECIMEN SOURCE: NORMAL

## 2020-01-01 PROCEDURE — 83986 ASSAY PH BODY FLUID NOS: CPT

## 2020-01-01 PROCEDURE — 85025 COMPLETE CBC W/AUTO DIFF WBC: CPT

## 2020-01-01 PROCEDURE — 6360000002 HC RX W HCPCS: Performed by: STUDENT IN AN ORGANIZED HEALTH CARE EDUCATION/TRAINING PROGRAM

## 2020-01-01 PROCEDURE — 85055 RETICULATED PLATELET ASSAY: CPT

## 2020-01-01 PROCEDURE — 89051 BODY FLUID CELL COUNT: CPT

## 2020-01-01 PROCEDURE — 2500000003 HC RX 250 WO HCPCS: Performed by: STUDENT IN AN ORGANIZED HEALTH CARE EDUCATION/TRAINING PROGRAM

## 2020-01-01 PROCEDURE — 6370000000 HC RX 637 (ALT 250 FOR IP): Performed by: STUDENT IN AN ORGANIZED HEALTH CARE EDUCATION/TRAINING PROGRAM

## 2020-01-01 PROCEDURE — C1769 GUIDE WIRE: HCPCS

## 2020-01-01 PROCEDURE — 97162 PT EVAL MOD COMPLEX 30 MIN: CPT

## 2020-01-01 PROCEDURE — 36415 COLL VENOUS BLD VENIPUNCTURE: CPT

## 2020-01-01 PROCEDURE — 94761 N-INVAS EAR/PLS OXIMETRY MLT: CPT

## 2020-01-01 PROCEDURE — C1894 INTRO/SHEATH, NON-LASER: HCPCS

## 2020-01-01 PROCEDURE — 80048 BASIC METABOLIC PNL TOTAL CA: CPT

## 2020-01-01 PROCEDURE — 87186 SC STD MICRODIL/AGAR DIL: CPT

## 2020-01-01 PROCEDURE — 93005 ELECTROCARDIOGRAM TRACING: CPT | Performed by: EMERGENCY MEDICINE

## 2020-01-01 PROCEDURE — 2580000003 HC RX 258

## 2020-01-01 PROCEDURE — 99233 SBSQ HOSP IP/OBS HIGH 50: CPT | Performed by: INTERNAL MEDICINE

## 2020-01-01 PROCEDURE — U0002 COVID-19 LAB TEST NON-CDC: HCPCS

## 2020-01-01 PROCEDURE — 82947 ASSAY GLUCOSE BLOOD QUANT: CPT

## 2020-01-01 PROCEDURE — 99221 1ST HOSP IP/OBS SF/LOW 40: CPT | Performed by: NURSE PRACTITIONER

## 2020-01-01 PROCEDURE — 1200000000 HC SEMI PRIVATE

## 2020-01-01 PROCEDURE — 93005 ELECTROCARDIOGRAM TRACING: CPT | Performed by: INTERNAL MEDICINE

## 2020-01-01 PROCEDURE — 82805 BLOOD GASES W/O2 SATURATION: CPT

## 2020-01-01 PROCEDURE — 85730 THROMBOPLASTIN TIME PARTIAL: CPT

## 2020-01-01 PROCEDURE — 6360000004 HC RX CONTRAST MEDICATION

## 2020-01-01 PROCEDURE — 99232 SBSQ HOSP IP/OBS MODERATE 35: CPT | Performed by: INTERNAL MEDICINE

## 2020-01-01 PROCEDURE — 6360000002 HC RX W HCPCS: Performed by: NURSE PRACTITIONER

## 2020-01-01 PROCEDURE — 2500000003 HC RX 250 WO HCPCS: Performed by: INTERNAL MEDICINE

## 2020-01-01 PROCEDURE — B2111ZZ FLUOROSCOPY OF MULTIPLE CORONARY ARTERIES USING LOW OSMOLAR CONTRAST: ICD-10-PCS | Performed by: INTERNAL MEDICINE

## 2020-01-01 PROCEDURE — 2580000003 HC RX 258: Performed by: STUDENT IN AN ORGANIZED HEALTH CARE EDUCATION/TRAINING PROGRAM

## 2020-01-01 PROCEDURE — 7100000000 HC PACU RECOVERY - FIRST 15 MIN

## 2020-01-01 PROCEDURE — 81001 URINALYSIS AUTO W/SCOPE: CPT

## 2020-01-01 PROCEDURE — 99223 1ST HOSP IP/OBS HIGH 75: CPT | Performed by: INTERNAL MEDICINE

## 2020-01-01 PROCEDURE — 84132 ASSAY OF SERUM POTASSIUM: CPT

## 2020-01-01 PROCEDURE — 96374 THER/PROPH/DIAG INJ IV PUSH: CPT

## 2020-01-01 PROCEDURE — 06HY33Z INSERTION OF INFUSION DEVICE INTO LOWER VEIN, PERCUTANEOUS APPROACH: ICD-10-PCS | Performed by: SURGERY

## 2020-01-01 PROCEDURE — 82435 ASSAY OF BLOOD CHLORIDE: CPT

## 2020-01-01 PROCEDURE — 71250 CT THORAX DX C-: CPT

## 2020-01-01 PROCEDURE — 84484 ASSAY OF TROPONIN QUANT: CPT

## 2020-01-01 PROCEDURE — 2060000000 HC ICU INTERMEDIATE R&B

## 2020-01-01 PROCEDURE — 85014 HEMATOCRIT: CPT

## 2020-01-01 PROCEDURE — 87102 FUNGUS ISOLATION CULTURE: CPT

## 2020-01-01 PROCEDURE — 93325 DOPPLER ECHO COLOR FLOW MAPG: CPT

## 2020-01-01 PROCEDURE — 83735 ASSAY OF MAGNESIUM: CPT

## 2020-01-01 PROCEDURE — 6360000002 HC RX W HCPCS: Performed by: INTERNAL MEDICINE

## 2020-01-01 PROCEDURE — 85610 PROTHROMBIN TIME: CPT

## 2020-01-01 PROCEDURE — 2580000003 HC RX 258: Performed by: INTERNAL MEDICINE

## 2020-01-01 PROCEDURE — 2580000003 HC RX 258: Performed by: NURSE PRACTITIONER

## 2020-01-01 PROCEDURE — 51702 INSERT TEMP BLADDER CATH: CPT

## 2020-01-01 PROCEDURE — 97530 THERAPEUTIC ACTIVITIES: CPT

## 2020-01-01 PROCEDURE — 93308 TTE F-UP OR LMTD: CPT

## 2020-01-01 PROCEDURE — 87206 SMEAR FLUORESCENT/ACID STAI: CPT

## 2020-01-01 PROCEDURE — 94660 CPAP INITIATION&MGMT: CPT

## 2020-01-01 PROCEDURE — 87086 URINE CULTURE/COLONY COUNT: CPT

## 2020-01-01 PROCEDURE — 93970 EXTREMITY STUDY: CPT

## 2020-01-01 PROCEDURE — 99285 EMERGENCY DEPT VISIT HI MDM: CPT

## 2020-01-01 PROCEDURE — 83605 ASSAY OF LACTIC ACID: CPT

## 2020-01-01 PROCEDURE — 2709999900 HC NON-CHARGEABLE SUPPLY

## 2020-01-01 PROCEDURE — 6360000002 HC RX W HCPCS: Performed by: EMERGENCY MEDICINE

## 2020-01-01 PROCEDURE — 2500000003 HC RX 250 WO HCPCS

## 2020-01-01 PROCEDURE — 33970 AORTIC CIRCULATION ASSIST: CPT | Performed by: INTERNAL MEDICINE

## 2020-01-01 PROCEDURE — 82803 BLOOD GASES ANY COMBINATION: CPT

## 2020-01-01 PROCEDURE — B4101ZZ FLUOROSCOPY OF ABDOMINAL AORTA USING LOW OSMOLAR CONTRAST: ICD-10-PCS | Performed by: INTERNAL MEDICINE

## 2020-01-01 PROCEDURE — 80197 ASSAY OF TACROLIMUS: CPT

## 2020-01-01 PROCEDURE — 6360000002 HC RX W HCPCS

## 2020-01-01 PROCEDURE — 2700000000 HC OXYGEN THERAPY PER DAY

## 2020-01-01 PROCEDURE — 97166 OT EVAL MOD COMPLEX 45 MIN: CPT

## 2020-01-01 PROCEDURE — 2000000000 HC ICU R&B

## 2020-01-01 PROCEDURE — 87015 SPECIMEN INFECT AGNT CONCNTJ: CPT

## 2020-01-01 PROCEDURE — 87040 BLOOD CULTURE FOR BACTERIA: CPT

## 2020-01-01 PROCEDURE — 7100000001 HC PACU RECOVERY - ADDTL 15 MIN

## 2020-01-01 PROCEDURE — 88305 TISSUE EXAM BY PATHOLOGIST: CPT

## 2020-01-01 PROCEDURE — 84300 ASSAY OF URINE SODIUM: CPT

## 2020-01-01 PROCEDURE — 0BH18EZ INSERTION OF ENDOTRACHEAL AIRWAY INTO TRACHEA, VIA NATURAL OR ARTIFICIAL OPENING ENDOSCOPIC: ICD-10-PCS | Performed by: ANESTHESIOLOGY

## 2020-01-01 PROCEDURE — 99291 CRITICAL CARE FIRST HOUR: CPT | Performed by: INTERNAL MEDICINE

## 2020-01-01 PROCEDURE — 71045 X-RAY EXAM CHEST 1 VIEW: CPT

## 2020-01-01 PROCEDURE — 2720000010 HC SURG SUPPLY STERILE

## 2020-01-01 PROCEDURE — 93306 TTE W/DOPPLER COMPLETE: CPT

## 2020-01-01 PROCEDURE — 94002 VENT MGMT INPAT INIT DAY: CPT

## 2020-01-01 PROCEDURE — 82570 ASSAY OF URINE CREATININE: CPT

## 2020-01-01 PROCEDURE — 36556 INSERT NON-TUNNEL CV CATH: CPT | Performed by: SURGERY

## 2020-01-01 PROCEDURE — 94010 BREATHING CAPACITY TEST: CPT

## 2020-01-01 PROCEDURE — 87075 CULTR BACTERIA EXCEPT BLOOD: CPT

## 2020-01-01 PROCEDURE — 93458 L HRT ARTERY/VENTRICLE ANGIO: CPT | Performed by: INTERNAL MEDICINE

## 2020-01-01 PROCEDURE — 97535 SELF CARE MNGMENT TRAINING: CPT

## 2020-01-01 PROCEDURE — 2780000010 HC IMPLANT OTHER

## 2020-01-01 PROCEDURE — 0W993ZZ DRAINAGE OF RIGHT PLEURAL CAVITY, PERCUTANEOUS APPROACH: ICD-10-PCS | Performed by: RADIOLOGY

## 2020-01-01 PROCEDURE — C1760 CLOSURE DEV, VASC: HCPCS

## 2020-01-01 PROCEDURE — 99213 OFFICE O/P EST LOW 20 MIN: CPT

## 2020-01-01 PROCEDURE — 83615 LACTATE (LD) (LDH) ENZYME: CPT

## 2020-01-01 PROCEDURE — 99222 1ST HOSP IP/OBS MODERATE 55: CPT | Performed by: NURSE PRACTITIONER

## 2020-01-01 PROCEDURE — 94770 HC ETCO2 MONITOR DAILY: CPT

## 2020-01-01 PROCEDURE — 99221 1ST HOSP IP/OBS SF/LOW 40: CPT | Performed by: INTERNAL MEDICINE

## 2020-01-01 PROCEDURE — 86160 COMPLEMENT ANTIGEN: CPT

## 2020-01-01 PROCEDURE — 96376 TX/PRO/DX INJ SAME DRUG ADON: CPT

## 2020-01-01 PROCEDURE — 99222 1ST HOSP IP/OBS MODERATE 55: CPT | Performed by: THORACIC SURGERY (CARDIOTHORACIC VASCULAR SURGERY)

## 2020-01-01 PROCEDURE — 87116 MYCOBACTERIA CULTURE: CPT

## 2020-01-01 PROCEDURE — 99222 1ST HOSP IP/OBS MODERATE 55: CPT | Performed by: INTERNAL MEDICINE

## 2020-01-01 PROCEDURE — 5A1935Z RESPIRATORY VENTILATION, LESS THAN 24 CONSECUTIVE HOURS: ICD-10-PCS | Performed by: INTERNAL MEDICINE

## 2020-01-01 PROCEDURE — 37799 UNLISTED PX VASCULAR SURGERY: CPT

## 2020-01-01 PROCEDURE — 82945 GLUCOSE OTHER FLUID: CPT

## 2020-01-01 PROCEDURE — 2709999900 US THORACENTESIS

## 2020-01-01 PROCEDURE — 36620 INSERTION CATHETER ARTERY: CPT | Performed by: SURGERY

## 2020-01-01 PROCEDURE — 82330 ASSAY OF CALCIUM: CPT

## 2020-01-01 PROCEDURE — 87077 CULTURE AEROBIC IDENTIFY: CPT

## 2020-01-01 PROCEDURE — 5A02210 ASSISTANCE WITH CARDIAC OUTPUT USING BALLOON PUMP, CONTINUOUS: ICD-10-PCS | Performed by: INTERNAL MEDICINE

## 2020-01-01 PROCEDURE — 83880 ASSAY OF NATRIURETIC PEPTIDE: CPT

## 2020-01-01 PROCEDURE — 93005 ELECTROCARDIOGRAM TRACING: CPT | Performed by: FAMILY MEDICINE

## 2020-01-01 PROCEDURE — 80053 COMPREHEN METABOLIC PANEL: CPT

## 2020-01-01 PROCEDURE — 84295 ASSAY OF SERUM SODIUM: CPT

## 2020-01-01 PROCEDURE — 84156 ASSAY OF PROTEIN URINE: CPT

## 2020-01-01 PROCEDURE — APPSS15 APP SPLIT SHARED TIME 0-15 MINUTES: Performed by: PHYSICIAN ASSISTANT

## 2020-01-01 PROCEDURE — 82565 ASSAY OF CREATININE: CPT

## 2020-01-01 PROCEDURE — 85027 COMPLETE CBC AUTOMATED: CPT

## 2020-01-01 PROCEDURE — 93880 EXTRACRANIAL BILAT STUDY: CPT

## 2020-01-01 PROCEDURE — 84157 ASSAY OF PROTEIN OTHER: CPT

## 2020-01-01 PROCEDURE — C1751 CATH, INF, PER/CENT/MIDLINE: HCPCS

## 2020-01-01 PROCEDURE — 88112 CYTOPATH CELL ENHANCE TECH: CPT

## 2020-01-01 PROCEDURE — 87070 CULTURE OTHR SPECIMN AEROBIC: CPT

## 2020-01-01 PROCEDURE — 87205 SMEAR GRAM STAIN: CPT

## 2020-01-01 RX ORDER — ONDANSETRON 2 MG/ML
4 INJECTION INTRAMUSCULAR; INTRAVENOUS EVERY 6 HOURS PRN
Status: DISCONTINUED | OUTPATIENT
Start: 2020-01-01 | End: 2020-11-03 | Stop reason: HOSPADM

## 2020-01-01 RX ORDER — SODIUM CHLORIDE 0.9 % (FLUSH) 0.9 %
10 SYRINGE (ML) INJECTION PRN
Status: DISCONTINUED | OUTPATIENT
Start: 2020-01-01 | End: 2020-11-03 | Stop reason: HOSPADM

## 2020-01-01 RX ORDER — LORAZEPAM 2 MG/ML
0.5 INJECTION INTRAMUSCULAR ONCE
Status: DISCONTINUED | OUTPATIENT
Start: 2020-01-01 | End: 2020-01-01

## 2020-01-01 RX ORDER — EPINEPHRINE 0.1 MG/ML
SYRINGE (ML) INJECTION
Status: DISCONTINUED
Start: 2020-01-01 | End: 2020-11-03 | Stop reason: HOSPADM

## 2020-01-01 RX ORDER — BUMETANIDE 0.25 MG/ML
2 INJECTION, SOLUTION INTRAMUSCULAR; INTRAVENOUS 2 TIMES DAILY
Status: DISCONTINUED | OUTPATIENT
Start: 2020-01-01 | End: 2020-01-01

## 2020-01-01 RX ORDER — AMLODIPINE BESYLATE 5 MG/1
5 TABLET ORAL NIGHTLY
Status: DISCONTINUED | OUTPATIENT
Start: 2020-01-01 | End: 2020-01-01

## 2020-01-01 RX ORDER — DEXTROSE MONOHYDRATE 50 MG/ML
100 INJECTION, SOLUTION INTRAVENOUS PRN
Status: DISCONTINUED | OUTPATIENT
Start: 2020-01-01 | End: 2020-11-03 | Stop reason: HOSPADM

## 2020-01-01 RX ORDER — ALBUTEROL SULFATE 2.5 MG/3ML
2.5 SOLUTION RESPIRATORY (INHALATION) EVERY 4 HOURS PRN
COMMUNITY

## 2020-01-01 RX ORDER — ETOMIDATE 2 MG/ML
INJECTION INTRAVENOUS DAILY PRN
Status: COMPLETED | OUTPATIENT
Start: 2020-01-01 | End: 2020-01-01

## 2020-01-01 RX ORDER — INSULIN GLARGINE 100 [IU]/ML
20 INJECTION, SOLUTION SUBCUTANEOUS NIGHTLY
Status: DISCONTINUED | OUTPATIENT
Start: 2020-01-01 | End: 2020-01-01

## 2020-01-01 RX ORDER — DOPAMINE HYDROCHLORIDE 160 MG/100ML
INJECTION, SOLUTION INTRAVENOUS
Status: DISCONTINUED
Start: 2020-01-01 | End: 2020-11-03 | Stop reason: HOSPADM

## 2020-01-01 RX ORDER — DOCUSATE SODIUM 100 MG/1
100 CAPSULE, LIQUID FILLED ORAL 3 TIMES DAILY
COMMUNITY

## 2020-01-01 RX ORDER — SODIUM CHLORIDE 9 MG/ML
INJECTION, SOLUTION INTRAVENOUS CONTINUOUS
Status: DISCONTINUED | OUTPATIENT
Start: 2020-01-01 | End: 2020-01-01

## 2020-01-01 RX ORDER — ACETAMINOPHEN 325 MG/1
650 TABLET ORAL EVERY 6 HOURS PRN
Status: DISCONTINUED | OUTPATIENT
Start: 2020-01-01 | End: 2020-11-03 | Stop reason: HOSPADM

## 2020-01-01 RX ORDER — NOREPINEPHRINE BIT/0.9 % NACL 16MG/250ML
2 INFUSION BOTTLE (ML) INTRAVENOUS CONTINUOUS
Status: DISCONTINUED | OUTPATIENT
Start: 2020-01-01 | End: 2020-11-03 | Stop reason: HOSPADM

## 2020-01-01 RX ORDER — SODIUM CHLORIDE 0.9 % (FLUSH) 0.9 %
10 SYRINGE (ML) INJECTION EVERY 12 HOURS SCHEDULED
Status: DISCONTINUED | OUTPATIENT
Start: 2020-01-01 | End: 2020-11-03 | Stop reason: HOSPADM

## 2020-01-01 RX ORDER — CALCIUM CHLORIDE 100 MG/ML
INJECTION INTRAVENOUS; INTRAVENTRICULAR DAILY PRN
Status: COMPLETED | OUTPATIENT
Start: 2020-01-01 | End: 2020-01-01

## 2020-01-01 RX ORDER — LABETALOL HYDROCHLORIDE 5 MG/ML
20 INJECTION, SOLUTION INTRAVENOUS
Status: DISCONTINUED | OUTPATIENT
Start: 2020-01-01 | End: 2020-11-03 | Stop reason: HOSPADM

## 2020-01-01 RX ORDER — LORAZEPAM 2 MG/ML
INJECTION INTRAMUSCULAR
Status: COMPLETED
Start: 2020-01-01 | End: 2020-01-01

## 2020-01-01 RX ORDER — MAGNESIUM SULFATE HEPTAHYDRATE 500 MG/ML
INJECTION, SOLUTION INTRAMUSCULAR; INTRAVENOUS DAILY PRN
Status: COMPLETED | OUTPATIENT
Start: 2020-01-01 | End: 2020-01-01

## 2020-01-01 RX ORDER — VITAMIN B COMPLEX
1 CAPSULE ORAL DAILY
COMMUNITY

## 2020-01-01 RX ORDER — ACETAMINOPHEN 325 MG/1
650 TABLET ORAL EVERY 4 HOURS PRN
Status: CANCELLED | OUTPATIENT
Start: 2020-01-01

## 2020-01-01 RX ORDER — ONDANSETRON 4 MG/1
4 TABLET, FILM COATED ORAL EVERY 8 HOURS PRN
COMMUNITY

## 2020-01-01 RX ORDER — HEPARIN SODIUM 1000 [USP'U]/ML
4000 INJECTION, SOLUTION INTRAVENOUS; SUBCUTANEOUS ONCE
Status: COMPLETED | OUTPATIENT
Start: 2020-01-01 | End: 2020-01-01

## 2020-01-01 RX ORDER — SODIUM CHLORIDE 0.9 % (FLUSH) 0.9 %
10 SYRINGE (ML) INJECTION PRN
Status: CANCELLED | OUTPATIENT
Start: 2020-01-01

## 2020-01-01 RX ORDER — POLYETHYLENE GLYCOL 3350 17 G/17G
17 POWDER, FOR SOLUTION ORAL DAILY PRN
Status: DISCONTINUED | OUTPATIENT
Start: 2020-01-01 | End: 2020-11-03 | Stop reason: HOSPADM

## 2020-01-01 RX ORDER — FUROSEMIDE 10 MG/ML
20 INJECTION INTRAMUSCULAR; INTRAVENOUS ONCE
Status: COMPLETED | OUTPATIENT
Start: 2020-01-01 | End: 2020-01-01

## 2020-01-01 RX ORDER — TACROLIMUS 1 MG/1
3 CAPSULE ORAL 2 TIMES DAILY
COMMUNITY

## 2020-01-01 RX ORDER — ACETYLCYSTEINE 200 MG/ML
600 SOLUTION ORAL; RESPIRATORY (INHALATION) 2 TIMES DAILY
Status: DISPENSED | OUTPATIENT
Start: 2020-01-01 | End: 2020-01-01

## 2020-01-01 RX ORDER — ATORVASTATIN CALCIUM 40 MG/1
40 TABLET, FILM COATED ORAL NIGHTLY
COMMUNITY

## 2020-01-01 RX ORDER — INSULIN GLARGINE 100 [IU]/ML
10 INJECTION, SOLUTION SUBCUTANEOUS NIGHTLY
Status: DISCONTINUED | OUTPATIENT
Start: 2020-01-01 | End: 2020-01-01

## 2020-01-01 RX ORDER — DORZOLAMIDE HCL 20 MG/ML
1 SOLUTION/ DROPS OPHTHALMIC 2 TIMES DAILY
COMMUNITY

## 2020-01-01 RX ORDER — HEPARIN SODIUM 1000 [USP'U]/ML
2000 INJECTION, SOLUTION INTRAVENOUS; SUBCUTANEOUS PRN
Status: DISCONTINUED | OUTPATIENT
Start: 2020-01-01 | End: 2020-11-03 | Stop reason: HOSPADM

## 2020-01-01 RX ORDER — LEVETIRACETAM 10 MG/ML
1000 INJECTION INTRAVASCULAR ONCE
Status: COMPLETED | OUTPATIENT
Start: 2020-01-01 | End: 2020-01-01

## 2020-01-01 RX ORDER — SENNA AND DOCUSATE SODIUM 50; 8.6 MG/1; MG/1
1 TABLET, FILM COATED ORAL DAILY
Status: DISCONTINUED | OUTPATIENT
Start: 2020-01-01 | End: 2020-11-03 | Stop reason: HOSPADM

## 2020-01-01 RX ORDER — PREDNISONE 1 MG/1
5 TABLET ORAL DAILY
COMMUNITY

## 2020-01-01 RX ORDER — TACROLIMUS 1 MG/1
3 CAPSULE ORAL 2 TIMES DAILY
Status: DISCONTINUED | OUTPATIENT
Start: 2020-01-01 | End: 2020-11-03 | Stop reason: HOSPADM

## 2020-01-01 RX ORDER — HEPARIN SODIUM 10000 [USP'U]/100ML
9.9 INJECTION, SOLUTION INTRAVENOUS CONTINUOUS
Status: DISCONTINUED | OUTPATIENT
Start: 2020-01-01 | End: 2020-11-03 | Stop reason: HOSPADM

## 2020-01-01 RX ORDER — HEPARIN SODIUM 1000 [USP'U]/ML
4000 INJECTION, SOLUTION INTRAVENOUS; SUBCUTANEOUS PRN
Status: DISCONTINUED | OUTPATIENT
Start: 2020-01-01 | End: 2020-11-03 | Stop reason: HOSPADM

## 2020-01-01 RX ORDER — POTASSIUM CHLORIDE 20 MEQ/1
40 TABLET, EXTENDED RELEASE ORAL ONCE
Status: COMPLETED | OUTPATIENT
Start: 2020-01-01 | End: 2020-01-01

## 2020-01-01 RX ORDER — LABETALOL HYDROCHLORIDE 5 MG/ML
10 INJECTION, SOLUTION INTRAVENOUS EVERY 30 MIN PRN
Status: CANCELLED | OUTPATIENT
Start: 2020-01-01

## 2020-01-01 RX ORDER — SENNA AND DOCUSATE SODIUM 50; 8.6 MG/1; MG/1
2 TABLET, FILM COATED ORAL DAILY
COMMUNITY

## 2020-01-01 RX ORDER — SUCCINYLCHOLINE CHLORIDE 20 MG/ML
INJECTION INTRAMUSCULAR; INTRAVENOUS DAILY PRN
Status: COMPLETED | OUTPATIENT
Start: 2020-01-01 | End: 2020-01-01

## 2020-01-01 RX ORDER — ACETAMINOPHEN 325 MG/1
650 TABLET ORAL EVERY 4 HOURS PRN
Status: DISCONTINUED | OUTPATIENT
Start: 2020-01-01 | End: 2020-11-03 | Stop reason: HOSPADM

## 2020-01-01 RX ORDER — FUROSEMIDE 40 MG/1
40 TABLET ORAL 2 TIMES DAILY
COMMUNITY

## 2020-01-01 RX ORDER — ERTAPENEM 1 G/1
1000 INJECTION, POWDER, LYOPHILIZED, FOR SOLUTION INTRAMUSCULAR; INTRAVENOUS EVERY 24 HOURS
COMMUNITY
Start: 2020-01-01 | End: 2020-01-01

## 2020-01-01 RX ORDER — ASPIRIN 81 MG/1
81 TABLET, CHEWABLE ORAL DAILY
Status: DISCONTINUED | OUTPATIENT
Start: 2020-01-01 | End: 2020-11-03 | Stop reason: HOSPADM

## 2020-01-01 RX ORDER — AMLODIPINE BESYLATE 10 MG/1
10 TABLET ORAL NIGHTLY
Status: DISCONTINUED | OUTPATIENT
Start: 2020-01-01 | End: 2020-11-03 | Stop reason: HOSPADM

## 2020-01-01 RX ORDER — NICOTINE POLACRILEX 4 MG
15 LOZENGE BUCCAL PRN
Status: DISCONTINUED | OUTPATIENT
Start: 2020-01-01 | End: 2020-11-03 | Stop reason: HOSPADM

## 2020-01-01 RX ORDER — LORAZEPAM 2 MG/ML
2 INJECTION INTRAMUSCULAR EVERY 6 HOURS PRN
Status: DISCONTINUED | OUTPATIENT
Start: 2020-01-01 | End: 2020-11-03 | Stop reason: HOSPADM

## 2020-01-01 RX ORDER — MYCOPHENOLIC ACID 180 MG/1
540 TABLET, DELAYED RELEASE ORAL 2 TIMES DAILY
Status: DISCONTINUED | OUTPATIENT
Start: 2020-01-01 | End: 2020-11-03 | Stop reason: HOSPADM

## 2020-01-01 RX ORDER — AMLODIPINE BESYLATE 10 MG/1
10 TABLET ORAL NIGHTLY
Status: DISCONTINUED | OUTPATIENT
Start: 2020-01-01 | End: 2020-01-01

## 2020-01-01 RX ORDER — CARVEDILOL 6.25 MG/1
6.25 TABLET ORAL 2 TIMES DAILY WITH MEALS
Status: DISCONTINUED | OUTPATIENT
Start: 2020-01-01 | End: 2020-11-03 | Stop reason: HOSPADM

## 2020-01-01 RX ORDER — TORSEMIDE 20 MG/1
40 TABLET ORAL DAILY
Status: DISCONTINUED | OUTPATIENT
Start: 2020-01-01 | End: 2020-11-03 | Stop reason: HOSPADM

## 2020-01-01 RX ORDER — HEPARIN SODIUM 10000 [USP'U]/100ML
9.9 INJECTION, SOLUTION INTRAVENOUS CONTINUOUS
Status: DISCONTINUED | OUTPATIENT
Start: 2020-01-01 | End: 2020-01-01

## 2020-01-01 RX ORDER — DOPAMINE HYDROCHLORIDE 160 MG/100ML
5 INJECTION, SOLUTION INTRAVENOUS CONTINUOUS
Status: DISCONTINUED | OUTPATIENT
Start: 2020-01-01 | End: 2020-11-03 | Stop reason: HOSPADM

## 2020-01-01 RX ORDER — ACETAMINOPHEN 650 MG/1
650 SUPPOSITORY RECTAL EVERY 6 HOURS PRN
Status: DISCONTINUED | OUTPATIENT
Start: 2020-01-01 | End: 2020-11-03 | Stop reason: HOSPADM

## 2020-01-01 RX ORDER — TAMSULOSIN HYDROCHLORIDE 0.4 MG/1
0.4 CAPSULE ORAL DAILY
Status: DISCONTINUED | OUTPATIENT
Start: 2020-01-01 | End: 2020-11-03 | Stop reason: HOSPADM

## 2020-01-01 RX ORDER — FUROSEMIDE 10 MG/ML
40 INJECTION INTRAMUSCULAR; INTRAVENOUS ONCE
Status: COMPLETED | OUTPATIENT
Start: 2020-01-01 | End: 2020-01-01

## 2020-01-01 RX ORDER — FUROSEMIDE 10 MG/ML
80 INJECTION INTRAMUSCULAR; INTRAVENOUS 2 TIMES DAILY
Status: DISCONTINUED | OUTPATIENT
Start: 2020-01-01 | End: 2020-11-03 | Stop reason: HOSPADM

## 2020-01-01 RX ORDER — MAGNESIUM OXIDE 400 MG/1
400 TABLET ORAL DAILY
COMMUNITY

## 2020-01-01 RX ORDER — HYDRALAZINE HYDROCHLORIDE 20 MG/ML
10 INJECTION INTRAMUSCULAR; INTRAVENOUS EVERY 10 MIN PRN
Status: CANCELLED | OUTPATIENT
Start: 2020-01-01

## 2020-01-01 RX ORDER — NITROGLYCERIN 20 MG/100ML
5 INJECTION INTRAVENOUS CONTINUOUS
Status: DISCONTINUED | OUTPATIENT
Start: 2020-01-01 | End: 2020-01-01

## 2020-01-01 RX ORDER — SODIUM CHLORIDE 9 MG/ML
INJECTION, SOLUTION INTRAVENOUS CONTINUOUS
Status: ACTIVE | OUTPATIENT
Start: 2020-01-01 | End: 2020-01-01

## 2020-01-01 RX ORDER — POLYETHYLENE GLYCOL 3350 17 G/17G
17 POWDER, FOR SOLUTION ORAL DAILY
COMMUNITY

## 2020-01-01 RX ORDER — ACETAMINOPHEN 325 MG/1
650 TABLET ORAL EVERY 6 HOURS PRN
COMMUNITY

## 2020-01-01 RX ORDER — ATORVASTATIN CALCIUM 40 MG/1
40 TABLET, FILM COATED ORAL NIGHTLY
Status: DISCONTINUED | OUTPATIENT
Start: 2020-01-01 | End: 2020-11-03 | Stop reason: HOSPADM

## 2020-01-01 RX ORDER — DEXTROSE MONOHYDRATE 25 G/50ML
12.5 INJECTION, SOLUTION INTRAVENOUS PRN
Status: DISCONTINUED | OUTPATIENT
Start: 2020-01-01 | End: 2020-11-03 | Stop reason: HOSPADM

## 2020-01-01 RX ORDER — ASPIRIN 81 MG/1
TABLET ORAL DAILY
COMMUNITY

## 2020-01-01 RX ORDER — MYCOPHENOLIC ACID 180 MG/1
540 TABLET, DELAYED RELEASE ORAL 2 TIMES DAILY
COMMUNITY

## 2020-01-01 RX ORDER — METOPROLOL TARTRATE 5 MG/5ML
2.5 INJECTION INTRAVENOUS ONCE
Status: DISCONTINUED | OUTPATIENT
Start: 2020-01-01 | End: 2020-01-01

## 2020-01-01 RX ORDER — SODIUM CHLORIDE 0.9 % (FLUSH) 0.9 %
10 SYRINGE (ML) INJECTION EVERY 12 HOURS SCHEDULED
Status: CANCELLED | OUTPATIENT
Start: 2020-01-01

## 2020-01-01 RX ORDER — FENTANYL CITRATE 50 UG/ML
25 INJECTION, SOLUTION INTRAMUSCULAR; INTRAVENOUS
Status: DISCONTINUED | OUTPATIENT
Start: 2020-01-01 | End: 2020-11-03 | Stop reason: HOSPADM

## 2020-01-01 RX ORDER — PREDNISONE 1 MG/1
5 TABLET ORAL DAILY
Status: DISCONTINUED | OUTPATIENT
Start: 2020-01-01 | End: 2020-11-03 | Stop reason: HOSPADM

## 2020-01-01 RX ORDER — TAMSULOSIN HYDROCHLORIDE 0.4 MG/1
0.4 CAPSULE ORAL NIGHTLY
COMMUNITY

## 2020-01-01 RX ORDER — PROMETHAZINE HYDROCHLORIDE 12.5 MG/1
12.5 TABLET ORAL EVERY 6 HOURS PRN
Status: DISCONTINUED | OUTPATIENT
Start: 2020-01-01 | End: 2020-11-03 | Stop reason: HOSPADM

## 2020-01-01 RX ORDER — INSULIN GLARGINE 100 [IU]/ML
15 INJECTION, SOLUTION SUBCUTANEOUS NIGHTLY
Status: DISCONTINUED | OUTPATIENT
Start: 2020-01-01 | End: 2020-11-03 | Stop reason: HOSPADM

## 2020-01-01 RX ADMIN — INSULIN LISPRO 1 UNITS: 100 INJECTION, SOLUTION INTRAVENOUS; SUBCUTANEOUS at 20:54

## 2020-01-01 RX ADMIN — INSULIN LISPRO 1 UNITS: 100 INJECTION, SOLUTION INTRAVENOUS; SUBCUTANEOUS at 12:24

## 2020-01-01 RX ADMIN — INSULIN GLARGINE 10 UNITS: 100 INJECTION, SOLUTION SUBCUTANEOUS at 20:53

## 2020-01-01 RX ADMIN — PREDNISONE 5 MG: 5 TABLET ORAL at 08:40

## 2020-01-01 RX ADMIN — PREDNISONE 5 MG: 5 TABLET ORAL at 08:10

## 2020-01-01 RX ADMIN — TAMSULOSIN HYDROCHLORIDE 0.4 MG: 0.4 CAPSULE ORAL at 08:40

## 2020-01-01 RX ADMIN — ACETAMINOPHEN 650 MG: 325 TABLET ORAL at 13:24

## 2020-01-01 RX ADMIN — CARVEDILOL 6.25 MG: 6.25 TABLET, FILM COATED ORAL at 17:26

## 2020-01-01 RX ADMIN — BUMETANIDE 2 MG: 0.25 INJECTION INTRAMUSCULAR; INTRAVENOUS at 10:14

## 2020-01-01 RX ADMIN — POTASSIUM CHLORIDE 40 MEQ: 1500 TABLET, EXTENDED RELEASE ORAL at 09:41

## 2020-01-01 RX ADMIN — MYCOPHENOLIC ACID 540 MG: 180 TABLET, DELAYED RELEASE ORAL at 08:46

## 2020-01-01 RX ADMIN — HEPARIN SODIUM 12.11 UNITS/KG/HR: 10000 INJECTION, SOLUTION INTRAVENOUS at 08:47

## 2020-01-01 RX ADMIN — TAMSULOSIN HYDROCHLORIDE 0.4 MG: 0.4 CAPSULE ORAL at 09:41

## 2020-01-01 RX ADMIN — ACETAMINOPHEN 650 MG: 325 TABLET ORAL at 18:46

## 2020-01-01 RX ADMIN — MYCOPHENOLIC ACID 540 MG: 180 TABLET, DELAYED RELEASE ORAL at 22:07

## 2020-01-01 RX ADMIN — POTASSIUM CHLORIDE 40 MEQ: 1500 TABLET, EXTENDED RELEASE ORAL at 06:06

## 2020-01-01 RX ADMIN — SODIUM CHLORIDE, PRESERVATIVE FREE 10 ML: 5 INJECTION INTRAVENOUS at 13:12

## 2020-01-01 RX ADMIN — MYCOPHENOLIC ACID 540 MG: 180 TABLET, DELAYED RELEASE ORAL at 23:08

## 2020-01-01 RX ADMIN — INSULIN GLARGINE 15 UNITS: 100 INJECTION, SOLUTION SUBCUTANEOUS at 23:12

## 2020-01-01 RX ADMIN — INSULIN LISPRO 2 UNITS: 100 INJECTION, SOLUTION INTRAVENOUS; SUBCUTANEOUS at 11:44

## 2020-01-01 RX ADMIN — INSULIN LISPRO 2 UNITS: 100 INJECTION, SOLUTION INTRAVENOUS; SUBCUTANEOUS at 11:32

## 2020-01-01 RX ADMIN — SODIUM CHLORIDE, PRESERVATIVE FREE 10 ML: 5 INJECTION INTRAVENOUS at 11:55

## 2020-01-01 RX ADMIN — TAMSULOSIN HYDROCHLORIDE 0.4 MG: 0.4 CAPSULE ORAL at 08:10

## 2020-01-01 RX ADMIN — STANDARDIZED SENNA CONCENTRATE AND DOCUSATE SODIUM 1 TABLET: 8.6; 5 TABLET ORAL at 09:22

## 2020-01-01 RX ADMIN — TACROLIMUS 3 MG: 1 CAPSULE ORAL at 08:10

## 2020-01-01 RX ADMIN — CARVEDILOL 6.25 MG: 6.25 TABLET, FILM COATED ORAL at 17:28

## 2020-01-01 RX ADMIN — NITROGLYCERIN 5 MCG/MIN: 20 INJECTION INTRAVENOUS at 17:03

## 2020-01-01 RX ADMIN — EPINEPHRINE 1 MG: 1 INJECTION INTRAMUSCULAR; INTRAVENOUS; SUBCUTANEOUS at 12:27

## 2020-01-01 RX ADMIN — TORSEMIDE 40 MG: 20 TABLET ORAL at 09:22

## 2020-01-01 RX ADMIN — VASOPRESSIN 0.04 UNITS/MIN: 20 INJECTION INTRAVENOUS at 19:57

## 2020-01-01 RX ADMIN — ASPIRIN 81 MG: 81 TABLET, CHEWABLE ORAL at 09:22

## 2020-01-01 RX ADMIN — METOPROLOL TARTRATE 25 MG: 25 TABLET ORAL at 21:00

## 2020-01-01 RX ADMIN — CARVEDILOL 6.25 MG: 6.25 TABLET, FILM COATED ORAL at 18:46

## 2020-01-01 RX ADMIN — MEROPENEM 1 G: 1 INJECTION, POWDER, FOR SOLUTION INTRAVENOUS at 21:57

## 2020-01-01 RX ADMIN — LORAZEPAM 2 MG: 2 INJECTION INTRAMUSCULAR at 18:28

## 2020-01-01 RX ADMIN — INSULIN LISPRO 1 UNITS: 100 INJECTION, SOLUTION INTRAVENOUS; SUBCUTANEOUS at 17:40

## 2020-01-01 RX ADMIN — BUMETANIDE 2 MG: 0.25 INJECTION INTRAMUSCULAR; INTRAVENOUS at 09:42

## 2020-01-01 RX ADMIN — INSULIN GLARGINE 15 UNITS: 100 INJECTION, SOLUTION SUBCUTANEOUS at 22:37

## 2020-01-01 RX ADMIN — TAMSULOSIN HYDROCHLORIDE 0.4 MG: 0.4 CAPSULE ORAL at 10:14

## 2020-01-01 RX ADMIN — PREDNISONE 5 MG: 5 TABLET ORAL at 09:22

## 2020-01-01 RX ADMIN — TACROLIMUS 3 MG: 1 CAPSULE ORAL at 08:40

## 2020-01-01 RX ADMIN — DESMOPRESSIN ACETATE 40 MG: 0.2 TABLET ORAL at 23:08

## 2020-01-01 RX ADMIN — INSULIN LISPRO 1 UNITS: 100 INJECTION, SOLUTION INTRAVENOUS; SUBCUTANEOUS at 23:13

## 2020-01-01 RX ADMIN — CARVEDILOL 6.25 MG: 6.25 TABLET, FILM COATED ORAL at 08:46

## 2020-01-01 RX ADMIN — SODIUM CHLORIDE, PRESERVATIVE FREE 10 ML: 5 INJECTION INTRAVENOUS at 09:42

## 2020-01-01 RX ADMIN — MEROPENEM 1 G: 1 INJECTION, POWDER, FOR SOLUTION INTRAVENOUS at 10:00

## 2020-01-01 RX ADMIN — DESMOPRESSIN ACETATE 40 MG: 0.2 TABLET ORAL at 20:40

## 2020-01-01 RX ADMIN — BUMETANIDE 2 MG: 0.25 INJECTION INTRAMUSCULAR; INTRAVENOUS at 20:41

## 2020-01-01 RX ADMIN — DESMOPRESSIN ACETATE 40 MG: 0.2 TABLET ORAL at 21:00

## 2020-01-01 RX ADMIN — Medication 20 MG: at 23:13

## 2020-01-01 RX ADMIN — INSULIN LISPRO 2 UNITS: 100 INJECTION, SOLUTION INTRAVENOUS; SUBCUTANEOUS at 16:55

## 2020-01-01 RX ADMIN — SODIUM CHLORIDE: 9 INJECTION, SOLUTION INTRAVENOUS at 17:27

## 2020-01-01 RX ADMIN — Medication 50 MEQ: at 12:30

## 2020-01-01 RX ADMIN — HEPARIN SODIUM 12.11 UNITS/KG/HR: 10000 INJECTION, SOLUTION INTRAVENOUS at 12:38

## 2020-01-01 RX ADMIN — ACETAMINOPHEN 650 MG: 325 TABLET ORAL at 17:38

## 2020-01-01 RX ADMIN — TAMSULOSIN HYDROCHLORIDE 0.4 MG: 0.4 CAPSULE ORAL at 10:50

## 2020-01-01 RX ADMIN — TACROLIMUS 3 MG: 1 CAPSULE ORAL at 19:48

## 2020-01-01 RX ADMIN — EPINEPHRINE 1 MG: 1 INJECTION INTRAMUSCULAR; INTRAVENOUS; SUBCUTANEOUS at 12:40

## 2020-01-01 RX ADMIN — INSULIN LISPRO 1 UNITS: 100 INJECTION, SOLUTION INTRAVENOUS; SUBCUTANEOUS at 22:43

## 2020-01-01 RX ADMIN — TAMSULOSIN HYDROCHLORIDE 0.4 MG: 0.4 CAPSULE ORAL at 10:15

## 2020-01-01 RX ADMIN — DESMOPRESSIN ACETATE 40 MG: 0.2 TABLET ORAL at 21:10

## 2020-01-01 RX ADMIN — INSULIN LISPRO 1 UNITS: 100 INJECTION, SOLUTION INTRAVENOUS; SUBCUTANEOUS at 11:31

## 2020-01-01 RX ADMIN — STANDARDIZED SENNA CONCENTRATE AND DOCUSATE SODIUM 1 TABLET: 8.6; 5 TABLET ORAL at 10:15

## 2020-01-01 RX ADMIN — AMLODIPINE BESYLATE 10 MG: 10 TABLET ORAL at 21:51

## 2020-01-01 RX ADMIN — ASPIRIN 81 MG: 81 TABLET, CHEWABLE ORAL at 08:40

## 2020-01-01 RX ADMIN — HEPARIN SODIUM 2000 UNITS: 1000 INJECTION INTRAVENOUS; SUBCUTANEOUS at 20:21

## 2020-01-01 RX ADMIN — DESMOPRESSIN ACETATE 40 MG: 0.2 TABLET ORAL at 21:53

## 2020-01-01 RX ADMIN — MYCOPHENOLIC ACID 540 MG: 180 TABLET, DELAYED RELEASE ORAL at 08:11

## 2020-01-01 RX ADMIN — INSULIN LISPRO 1 UNITS: 100 INJECTION, SOLUTION INTRAVENOUS; SUBCUTANEOUS at 19:47

## 2020-01-01 RX ADMIN — FUROSEMIDE 40 MG: 10 INJECTION, SOLUTION INTRAMUSCULAR; INTRAVENOUS at 11:08

## 2020-01-01 RX ADMIN — EPINEPHRINE 1 MG: 1 INJECTION INTRAMUSCULAR; INTRAVENOUS; SUBCUTANEOUS at 12:30

## 2020-01-01 RX ADMIN — CARVEDILOL 6.25 MG: 6.25 TABLET, FILM COATED ORAL at 09:21

## 2020-01-01 RX ADMIN — HEPARIN SODIUM 11.9 UNITS/KG/HR: 10000 INJECTION, SOLUTION INTRAVENOUS at 03:52

## 2020-01-01 RX ADMIN — STANDARDIZED SENNA CONCENTRATE AND DOCUSATE SODIUM 1 TABLET: 8.6; 5 TABLET ORAL at 10:50

## 2020-01-01 RX ADMIN — TACROLIMUS 3 MG: 1 CAPSULE ORAL at 21:10

## 2020-01-01 RX ADMIN — TACROLIMUS 3 MG: 1 CAPSULE ORAL at 08:46

## 2020-01-01 RX ADMIN — LEVETIRACETAM 250 MG: 100 INJECTION, SOLUTION INTRAVENOUS at 18:52

## 2020-01-01 RX ADMIN — MYCOPHENOLIC ACID 540 MG: 180 TABLET, DELAYED RELEASE ORAL at 10:16

## 2020-01-01 RX ADMIN — MYCOPHENOLIC ACID 540 MG: 180 TABLET, DELAYED RELEASE ORAL at 15:03

## 2020-01-01 RX ADMIN — TACROLIMUS 3 MG: 1 CAPSULE ORAL at 10:51

## 2020-01-01 RX ADMIN — BUMETANIDE 2 MG: 0.25 INJECTION INTRAMUSCULAR; INTRAVENOUS at 08:40

## 2020-01-01 RX ADMIN — MYCOPHENOLIC ACID 540 MG: 180 TABLET, DELAYED RELEASE ORAL at 08:40

## 2020-01-01 RX ADMIN — BUMETANIDE 2 MG: 0.25 INJECTION INTRAMUSCULAR; INTRAVENOUS at 22:38

## 2020-01-01 RX ADMIN — INSULIN LISPRO 2 UNITS: 100 INJECTION, SOLUTION INTRAVENOUS; SUBCUTANEOUS at 10:21

## 2020-01-01 RX ADMIN — INSULIN LISPRO 3 UNITS: 100 INJECTION, SOLUTION INTRAVENOUS; SUBCUTANEOUS at 17:33

## 2020-01-01 RX ADMIN — MYCOPHENOLIC ACID 540 MG: 180 TABLET, DELAYED RELEASE ORAL at 20:40

## 2020-01-01 RX ADMIN — ETOMIDATE 20 MG: 2 INJECTION, SOLUTION INTRAVENOUS at 12:34

## 2020-01-01 RX ADMIN — LORAZEPAM 2 MG: 2 INJECTION INTRAMUSCULAR; INTRAVENOUS at 18:28

## 2020-01-01 RX ADMIN — MEROPENEM 1 G: 1 INJECTION, POWDER, FOR SOLUTION INTRAVENOUS at 12:14

## 2020-01-01 RX ADMIN — ACETAMINOPHEN 650 MG: 325 TABLET ORAL at 03:56

## 2020-01-01 RX ADMIN — Medication 20 MG: at 05:49

## 2020-01-01 RX ADMIN — Medication 1 MG/HR: at 18:56

## 2020-01-01 RX ADMIN — PREDNISONE 5 MG: 5 TABLET ORAL at 15:03

## 2020-01-01 RX ADMIN — METOPROLOL TARTRATE 25 MG: 25 TABLET ORAL at 09:41

## 2020-01-01 RX ADMIN — TACROLIMUS 3 MG: 1 CAPSULE ORAL at 22:07

## 2020-01-01 RX ADMIN — SODIUM CHLORIDE, PRESERVATIVE FREE 10 ML: 5 INJECTION INTRAVENOUS at 20:10

## 2020-01-01 RX ADMIN — TAMSULOSIN HYDROCHLORIDE 0.4 MG: 0.4 CAPSULE ORAL at 09:22

## 2020-01-01 RX ADMIN — ASPIRIN 81 MG: 81 TABLET, CHEWABLE ORAL at 13:03

## 2020-01-01 RX ADMIN — STANDARDIZED SENNA CONCENTRATE AND DOCUSATE SODIUM 1 TABLET: 8.6; 5 TABLET ORAL at 08:10

## 2020-01-01 RX ADMIN — MEROPENEM 1 G: 1 INJECTION, POWDER, FOR SOLUTION INTRAVENOUS at 05:03

## 2020-01-01 RX ADMIN — STANDARDIZED SENNA CONCENTRATE AND DOCUSATE SODIUM 1 TABLET: 8.6; 5 TABLET ORAL at 09:41

## 2020-01-01 RX ADMIN — LEVETIRACETAM 1000 MG: 10 INJECTION INTRAVENOUS at 19:09

## 2020-01-01 RX ADMIN — ASPIRIN 81 MG: 81 TABLET, CHEWABLE ORAL at 10:16

## 2020-01-01 RX ADMIN — METOPROLOL TARTRATE 25 MG: 25 TABLET ORAL at 10:14

## 2020-01-01 RX ADMIN — HEPARIN SODIUM 9.9 UNITS/KG/HR: 10000 INJECTION, SOLUTION INTRAVENOUS at 22:03

## 2020-01-01 RX ADMIN — PREDNISONE 5 MG: 5 TABLET ORAL at 10:15

## 2020-01-01 RX ADMIN — Medication 600 MG: at 08:40

## 2020-01-01 RX ADMIN — TACROLIMUS 3 MG: 1 CAPSULE ORAL at 23:09

## 2020-01-01 RX ADMIN — ASPIRIN 81 MG: 81 TABLET, CHEWABLE ORAL at 10:50

## 2020-01-01 RX ADMIN — BUMETANIDE 2 MG: 0.25 INJECTION INTRAMUSCULAR; INTRAVENOUS at 21:01

## 2020-01-01 RX ADMIN — PREDNISONE 5 MG: 5 TABLET ORAL at 08:46

## 2020-01-01 RX ADMIN — HEPARIN SODIUM 13.1 UNITS/KG/HR: 10000 INJECTION, SOLUTION INTRAVENOUS at 06:28

## 2020-01-01 RX ADMIN — METOPROLOL TARTRATE 25 MG: 25 TABLET ORAL at 20:40

## 2020-01-01 RX ADMIN — HEPARIN SODIUM 12.89 UNITS/KG/HR: 10000 INJECTION, SOLUTION INTRAVENOUS at 12:24

## 2020-01-01 RX ADMIN — SODIUM CHLORIDE, PRESERVATIVE FREE 10 ML: 5 INJECTION INTRAVENOUS at 19:49

## 2020-01-01 RX ADMIN — CARVEDILOL 6.25 MG: 6.25 TABLET, FILM COATED ORAL at 10:15

## 2020-01-01 RX ADMIN — INSULIN LISPRO 1 UNITS: 100 INJECTION, SOLUTION INTRAVENOUS; SUBCUTANEOUS at 09:34

## 2020-01-01 RX ADMIN — HEPARIN SODIUM 13.9 UNITS/KG/HR: 10000 INJECTION, SOLUTION INTRAVENOUS at 20:20

## 2020-01-01 RX ADMIN — FUROSEMIDE 20 MG: 10 INJECTION, SOLUTION INTRAMUSCULAR; INTRAVENOUS at 15:26

## 2020-01-01 RX ADMIN — TACROLIMUS 3 MG: 1 CAPSULE ORAL at 10:16

## 2020-01-01 RX ADMIN — HEPARIN SODIUM 2000 UNITS: 1000 INJECTION INTRAVENOUS; SUBCUTANEOUS at 01:42

## 2020-01-01 RX ADMIN — ACETAMINOPHEN 650 MG: 325 TABLET ORAL at 09:41

## 2020-01-01 RX ADMIN — AMLODIPINE BESYLATE 10 MG: 10 TABLET ORAL at 19:48

## 2020-01-01 RX ADMIN — CARVEDILOL 6.25 MG: 6.25 TABLET, FILM COATED ORAL at 10:56

## 2020-01-01 RX ADMIN — CARVEDILOL 6.25 MG: 6.25 TABLET, FILM COATED ORAL at 16:56

## 2020-01-01 RX ADMIN — DESMOPRESSIN ACETATE 40 MG: 0.2 TABLET ORAL at 19:48

## 2020-01-01 RX ADMIN — TAMSULOSIN HYDROCHLORIDE 0.4 MG: 0.4 CAPSULE ORAL at 08:46

## 2020-01-01 RX ADMIN — DEXTROSE MONOHYDRATE 12.5 G: 25 INJECTION, SOLUTION INTRAVENOUS at 06:26

## 2020-01-01 RX ADMIN — Medication 600 MG: at 20:39

## 2020-01-01 RX ADMIN — Medication 600 MG: at 15:06

## 2020-01-01 RX ADMIN — INSULIN LISPRO 1 UNITS: 100 INJECTION, SOLUTION INTRAVENOUS; SUBCUTANEOUS at 16:55

## 2020-01-01 RX ADMIN — CEFTRIAXONE SODIUM 1 G: 1 INJECTION, POWDER, FOR SOLUTION INTRAMUSCULAR; INTRAVENOUS at 06:27

## 2020-01-01 RX ADMIN — MYCOPHENOLIC ACID 540 MG: 180 TABLET, DELAYED RELEASE ORAL at 09:22

## 2020-01-01 RX ADMIN — TORSEMIDE 40 MG: 20 TABLET ORAL at 10:15

## 2020-01-01 RX ADMIN — INSULIN GLARGINE 15 UNITS: 100 INJECTION, SOLUTION SUBCUTANEOUS at 22:08

## 2020-01-01 RX ADMIN — EPINEPHRINE 1 MG: 1 INJECTION INTRAMUSCULAR; INTRAVENOUS; SUBCUTANEOUS at 12:44

## 2020-01-01 RX ADMIN — HEPARIN SODIUM 2000 UNITS: 1000 INJECTION INTRAVENOUS; SUBCUTANEOUS at 03:52

## 2020-01-01 RX ADMIN — INSULIN LISPRO 2 UNITS: 100 INJECTION, SOLUTION INTRAVENOUS; SUBCUTANEOUS at 22:08

## 2020-01-01 RX ADMIN — MEROPENEM 1 G: 1 INJECTION, POWDER, FOR SOLUTION INTRAVENOUS at 11:08

## 2020-01-01 RX ADMIN — INSULIN LISPRO 1 UNITS: 100 INJECTION, SOLUTION INTRAVENOUS; SUBCUTANEOUS at 21:01

## 2020-01-01 RX ADMIN — SODIUM CHLORIDE, PRESERVATIVE FREE 10 ML: 5 INJECTION INTRAVENOUS at 20:39

## 2020-01-01 RX ADMIN — MYCOPHENOLIC ACID 540 MG: 180 TABLET, DELAYED RELEASE ORAL at 21:52

## 2020-01-01 RX ADMIN — INSULIN LISPRO 2 UNITS: 100 INJECTION, SOLUTION INTRAVENOUS; SUBCUTANEOUS at 13:40

## 2020-01-01 RX ADMIN — MEROPENEM 1 G: 1 INJECTION, POWDER, FOR SOLUTION INTRAVENOUS at 21:27

## 2020-01-01 RX ADMIN — CEFTRIAXONE SODIUM 1 G: 1 INJECTION, POWDER, FOR SOLUTION INTRAMUSCULAR; INTRAVENOUS at 07:57

## 2020-01-01 RX ADMIN — AMLODIPINE BESYLATE 10 MG: 10 TABLET ORAL at 21:10

## 2020-01-01 RX ADMIN — STANDARDIZED SENNA CONCENTRATE AND DOCUSATE SODIUM 1 TABLET: 8.6; 5 TABLET ORAL at 08:46

## 2020-01-01 RX ADMIN — CARVEDILOL 6.25 MG: 6.25 TABLET, FILM COATED ORAL at 08:40

## 2020-01-01 RX ADMIN — MYCOPHENOLIC ACID 540 MG: 180 TABLET, DELAYED RELEASE ORAL at 10:51

## 2020-01-01 RX ADMIN — INSULIN GLARGINE 10 UNITS: 100 INJECTION, SOLUTION SUBCUTANEOUS at 21:01

## 2020-01-01 RX ADMIN — TACROLIMUS 3 MG: 1 CAPSULE ORAL at 20:40

## 2020-01-01 RX ADMIN — HEPARIN SODIUM 2000 UNITS: 1000 INJECTION INTRAVENOUS; SUBCUTANEOUS at 00:23

## 2020-01-01 RX ADMIN — CEFTRIAXONE SODIUM 1 G: 1 INJECTION, POWDER, FOR SOLUTION INTRAMUSCULAR; INTRAVENOUS at 05:54

## 2020-01-01 RX ADMIN — DEXTROSE MONOHYDRATE 12.5 G: 25 INJECTION, SOLUTION INTRAVENOUS at 10:53

## 2020-01-01 RX ADMIN — METOPROLOL TARTRATE 25 MG: 25 TABLET ORAL at 08:37

## 2020-01-01 RX ADMIN — DESMOPRESSIN ACETATE 40 MG: 0.2 TABLET ORAL at 22:06

## 2020-01-01 RX ADMIN — HEPARIN SODIUM 9.9 UNITS/KG/HR: 10000 INJECTION, SOLUTION INTRAVENOUS at 19:30

## 2020-01-01 RX ADMIN — CALCIUM CHLORIDE 1 G: 100 INJECTION, SOLUTION INTRAVENOUS; INTRAVENTRICULAR at 12:45

## 2020-01-01 RX ADMIN — Medication 50 MEQ: at 12:39

## 2020-01-01 RX ADMIN — MYCOPHENOLIC ACID 540 MG: 180 TABLET, DELAYED RELEASE ORAL at 21:10

## 2020-01-01 RX ADMIN — DEXTROSE MONOHYDRATE 12.5 G: 25 INJECTION, SOLUTION INTRAVENOUS at 23:58

## 2020-01-01 RX ADMIN — PREDNISONE 5 MG: 5 TABLET ORAL at 10:50

## 2020-01-01 RX ADMIN — HEPARIN SODIUM 11.9 UNITS/KG/HR: 10000 INJECTION, SOLUTION INTRAVENOUS at 06:15

## 2020-01-01 RX ADMIN — AMLODIPINE BESYLATE 10 MG: 10 TABLET ORAL at 20:40

## 2020-01-01 RX ADMIN — TACROLIMUS 3 MG: 1 CAPSULE ORAL at 15:03

## 2020-01-01 RX ADMIN — PREDNISONE 5 MG: 5 TABLET ORAL at 08:37

## 2020-01-01 RX ADMIN — EPINEPHRINE 1 MG: 1 INJECTION INTRAMUSCULAR; INTRAVENOUS; SUBCUTANEOUS at 12:37

## 2020-01-01 RX ADMIN — CEFTRIAXONE SODIUM 1 G: 1 INJECTION, POWDER, FOR SOLUTION INTRAMUSCULAR; INTRAVENOUS at 06:12

## 2020-01-01 RX ADMIN — AMLODIPINE BESYLATE 10 MG: 10 TABLET ORAL at 17:39

## 2020-01-01 RX ADMIN — HEPARIN SODIUM 11.9 UNITS/KG/HR: 10000 INJECTION, SOLUTION INTRAVENOUS at 04:14

## 2020-01-01 RX ADMIN — MEROPENEM 1 G: 1 INJECTION, POWDER, FOR SOLUTION INTRAVENOUS at 03:24

## 2020-01-01 RX ADMIN — HEPARIN SODIUM 13.9 UNITS/KG/HR: 10000 INJECTION, SOLUTION INTRAVENOUS at 00:23

## 2020-01-01 RX ADMIN — INSULIN GLARGINE 15 UNITS: 100 INJECTION, SOLUTION SUBCUTANEOUS at 22:43

## 2020-01-01 RX ADMIN — CARVEDILOL 6.25 MG: 6.25 TABLET, FILM COATED ORAL at 08:10

## 2020-01-01 RX ADMIN — MYCOPHENOLIC ACID 540 MG: 180 TABLET, DELAYED RELEASE ORAL at 22:10

## 2020-01-01 RX ADMIN — FUROSEMIDE 20 MG: 10 INJECTION, SOLUTION INTRAMUSCULAR; INTRAVENOUS at 15:02

## 2020-01-01 RX ADMIN — MAGNESIUM SULFATE HEPTAHYDRATE 1 G: 500 INJECTION, SOLUTION INTRAMUSCULAR; INTRAVENOUS at 13:04

## 2020-01-01 RX ADMIN — SODIUM CHLORIDE, PRESERVATIVE FREE 10 ML: 5 INJECTION INTRAVENOUS at 23:36

## 2020-01-01 RX ADMIN — HEPARIN SODIUM 2000 UNITS: 1000 INJECTION INTRAVENOUS; SUBCUTANEOUS at 05:47

## 2020-01-01 RX ADMIN — Medication 2 MCG/MIN: at 16:20

## 2020-01-01 RX ADMIN — STANDARDIZED SENNA CONCENTRATE AND DOCUSATE SODIUM 1 TABLET: 8.6; 5 TABLET ORAL at 10:34

## 2020-01-01 RX ADMIN — MEROPENEM 1 G: 1 INJECTION, POWDER, FOR SOLUTION INTRAVENOUS at 13:02

## 2020-01-01 RX ADMIN — HEPARIN SODIUM 4000 UNITS: 1000 INJECTION INTRAVENOUS; SUBCUTANEOUS at 19:29

## 2020-01-01 RX ADMIN — SODIUM CHLORIDE: 9 INJECTION, SOLUTION INTRAVENOUS at 05:30

## 2020-01-01 RX ADMIN — HEPARIN SODIUM 12 UNITS/KG/HR: 10000 INJECTION, SOLUTION INTRAVENOUS at 01:42

## 2020-01-01 RX ADMIN — HEPARIN SODIUM 2000 UNITS: 1000 INJECTION INTRAVENOUS; SUBCUTANEOUS at 11:08

## 2020-01-01 RX ADMIN — SODIUM CHLORIDE, PRESERVATIVE FREE 10 ML: 5 INJECTION INTRAVENOUS at 21:55

## 2020-01-01 RX ADMIN — AMLODIPINE BESYLATE 10 MG: 10 TABLET ORAL at 23:08

## 2020-01-01 RX ADMIN — INSULIN GLARGINE 10 UNITS: 100 INJECTION, SOLUTION SUBCUTANEOUS at 22:17

## 2020-01-01 RX ADMIN — HEPARIN SODIUM 2000 UNITS: 1000 INJECTION INTRAVENOUS; SUBCUTANEOUS at 11:43

## 2020-01-01 RX ADMIN — TACROLIMUS 3 MG: 1 CAPSULE ORAL at 09:22

## 2020-01-01 RX ADMIN — PHENYLEPHRINE HYDROCHLORIDE 300 MCG/MIN: 10 INJECTION INTRAVENOUS at 19:56

## 2020-01-01 RX ADMIN — SODIUM CHLORIDE, PRESERVATIVE FREE 10 ML: 5 INJECTION INTRAVENOUS at 21:10

## 2020-01-01 RX ADMIN — MEROPENEM 1 G: 1 INJECTION, POWDER, FOR SOLUTION INTRAVENOUS at 04:02

## 2020-01-01 RX ADMIN — SUCCINYLCHOLINE CHLORIDE 100 MG: 20 INJECTION, SOLUTION INTRAMUSCULAR; INTRAVENOUS at 12:34

## 2020-01-01 RX ADMIN — TACROLIMUS 3 MG: 1 CAPSULE ORAL at 21:53

## 2020-01-01 RX ADMIN — ASPIRIN 81 MG: 81 TABLET, CHEWABLE ORAL at 08:10

## 2020-01-01 ASSESSMENT — PAIN SCALES - GENERAL
PAINLEVEL_OUTOF10: 10
PAINLEVEL_OUTOF10: 5
PAINLEVEL_OUTOF10: 0
PAINLEVEL_OUTOF10: 7
PAINLEVEL_OUTOF10: 8
PAINLEVEL_OUTOF10: 0
PAINLEVEL_OUTOF10: 3
PAINLEVEL_OUTOF10: 4
PAINLEVEL_OUTOF10: 2
PAINLEVEL_OUTOF10: 7
PAINLEVEL_OUTOF10: 0
PAINLEVEL_OUTOF10: 8
PAINLEVEL_OUTOF10: 0
PAINLEVEL_OUTOF10: 3
PAINLEVEL_OUTOF10: 0

## 2020-01-01 ASSESSMENT — PAIN DESCRIPTION - FREQUENCY
FREQUENCY: CONTINUOUS
FREQUENCY: INTERMITTENT
FREQUENCY: CONTINUOUS

## 2020-01-01 ASSESSMENT — PULMONARY FUNCTION TESTS
PIF_VALUE: 34
PIF_VALUE: 7
PIF_VALUE: 34
PIF_VALUE: 36
PIF_VALUE: 38
PIF_VALUE: 39
PIF_VALUE: 8
PIF_VALUE: 37
PIF_VALUE: 34
PIF_VALUE: 45
PIF_VALUE: 36
PIF_VALUE: 34
PIF_VALUE: 40
PIF_VALUE: 41
PIF_VALUE: 34
PIF_VALUE: 34
PIF_VALUE: 45
PIF_VALUE: 58
PIF_VALUE: 39
PIF_VALUE: 38
PIF_VALUE: 37
PIF_VALUE: 34
PIF_VALUE: 40
PIF_VALUE: 44
PIF_VALUE: 38
PIF_VALUE: 36
PIF_VALUE: 36
PIF_VALUE: 34

## 2020-01-01 ASSESSMENT — ENCOUNTER SYMPTOMS
BACK PAIN: 1
ABDOMINAL PAIN: 0
BACK PAIN: 1
EYES NEGATIVE: 1
DIARRHEA: 0
BACK PAIN: 1
ABDOMINAL PAIN: 0
BACK PAIN: 0
COUGH: 0
VOMITING: 0
DIARRHEA: 0
BACK PAIN: 0
ABDOMINAL PAIN: 0
WHEEZING: 0
DIARRHEA: 0
NAUSEA: 0
DIARRHEA: 0
COUGH: 0
BACK PAIN: 1
VOMITING: 0
NAUSEA: 0
ABDOMINAL PAIN: 0
VOMITING: 0
NAUSEA: 0
ABDOMINAL PAIN: 0
EYES NEGATIVE: 1
SHORTNESS OF BREATH: 1
EYES NEGATIVE: 1
DIARRHEA: 0
EYES NEGATIVE: 1
ALLERGIC/IMMUNOLOGIC NEGATIVE: 1
DIARRHEA: 0
SORE THROAT: 0
NAUSEA: 0
NAUSEA: 0
VOMITING: 0
COUGH: 0
TROUBLE SWALLOWING: 0
VOMITING: 0
SORE THROAT: 0
NAUSEA: 0
ABDOMINAL PAIN: 0
COUGH: 0
SHORTNESS OF BREATH: 1
VOMITING: 0
CONSTIPATION: 0
NAUSEA: 0
ABDOMINAL PAIN: 0

## 2020-01-01 ASSESSMENT — PAIN DESCRIPTION - ONSET
ONSET: ON-GOING
ONSET: ON-GOING
ONSET: GRADUAL

## 2020-01-01 ASSESSMENT — PAIN DESCRIPTION - LOCATION
LOCATION: BUTTOCKS
LOCATION: BUTTOCKS;COCCYX
LOCATION: ARM

## 2020-01-01 ASSESSMENT — PAIN DESCRIPTION - PROGRESSION
CLINICAL_PROGRESSION: NOT CHANGED

## 2020-01-01 ASSESSMENT — PAIN DESCRIPTION - ORIENTATION
ORIENTATION: RIGHT
ORIENTATION: MID

## 2020-01-01 ASSESSMENT — PAIN DESCRIPTION - PAIN TYPE
TYPE: ACUTE PAIN
TYPE: ACUTE PAIN
TYPE: CHRONIC PAIN

## 2020-01-01 ASSESSMENT — PAIN - FUNCTIONAL ASSESSMENT: PAIN_FUNCTIONAL_ASSESSMENT: PREVENTS OR INTERFERES SOME ACTIVE ACTIVITIES AND ADLS

## 2020-01-01 ASSESSMENT — PAIN DESCRIPTION - DESCRIPTORS
DESCRIPTORS: DISCOMFORT;ACHING;PRESSURE
DESCRIPTORS: CONSTANT;DISCOMFORT
DESCRIPTORS: ACHING

## 2020-10-24 PROBLEM — Z94.0 KIDNEY TRANSPLANT RECIPIENT: Status: ACTIVE | Noted: 2020-01-01

## 2020-10-24 PROBLEM — R06.02 SHORTNESS OF BREATH: Status: ACTIVE | Noted: 2020-01-01

## 2020-10-24 PROBLEM — D64.9 NORMOCYTIC ANEMIA: Status: ACTIVE | Noted: 2020-01-01

## 2020-10-24 PROBLEM — J96.22 ACUTE ON CHRONIC RESPIRATORY FAILURE WITH HYPOXIA AND HYPERCAPNIA (HCC): Status: ACTIVE | Noted: 2020-01-01

## 2020-10-24 PROBLEM — N17.9 AKI (ACUTE KIDNEY INJURY) (HCC): Status: ACTIVE | Noted: 2020-01-01

## 2020-10-24 PROBLEM — E87.0 HYPERNATREMIA: Status: ACTIVE | Noted: 2020-01-01

## 2020-10-24 PROBLEM — J96.21 ACUTE ON CHRONIC RESPIRATORY FAILURE WITH HYPOXIA AND HYPERCAPNIA (HCC): Status: ACTIVE | Noted: 2020-01-01

## 2020-10-24 PROBLEM — J96.02 ACUTE RESPIRATORY ACIDOSIS (HCC): Status: ACTIVE | Noted: 2020-01-01

## 2020-10-24 PROBLEM — E11.9 TYPE 2 DIABETES MELLITUS (HCC): Status: ACTIVE | Noted: 2020-01-01

## 2020-10-24 PROBLEM — I50.33 ACUTE ON CHRONIC DIASTOLIC (CONGESTIVE) HEART FAILURE (HCC): Status: ACTIVE | Noted: 2020-01-01

## 2020-10-24 PROBLEM — N18.30 STAGE 3 CHRONIC KIDNEY DISEASE (HCC): Status: ACTIVE | Noted: 2020-01-01

## 2020-10-24 PROBLEM — I48.92 ATRIAL FLUTTER WITH CONTROLLED RESPONSE (HCC): Status: ACTIVE | Noted: 2020-01-01

## 2020-10-24 NOTE — ED PROVIDER NOTES
[facility]  [facility]  Phone: [phone]        Pt Name: Robert Hernández  MRN: [id number]  [place] 1945  Date of evaluation: 10/24/20      CHIEF COMPLAINT       Chief Complaint   Patient presents with    Shortness of Breath     15 min PTA- PT is a resident of [address] [place] [address] Drive is a 66 y.o. male who presents shortness of breath at the nursing home he was recently discharged from [facility] after respiratory failure from CHF at which time he had been intubated he has a history renal failure status post transplant he is also got history of CHF and COPD he is O2 dependent at 2 L  Patient does not give much history himself says he was just short of breath denies any pain other than from a sacral decubitus  Patient is on Eliquis according to his records  REVIEW OF SYSTEMS         Review of Systems   Constitutional: Negative for chills and fever. HENT: Negative for congestion, dental problem, sore throat and trouble swallowing. Eyes: Negative for visual disturbance. Respiratory: Positive for shortness of breath. Negative for wheezing. Cardiovascular: Negative for chest pain, palpitations and leg swelling. Gastrointestinal: Negative for abdominal pain, nausea and vomiting. Genitourinary: Negative for difficulty urinating, dysuria and testicular pain. Patient has had a kidney transplant   Musculoskeletal: Negative for back pain, joint swelling and neck pain. And has a lateral below-knee amputations also complains of a decubitus ulcer causing pain   Skin: Negative for rash. Neurological: Negative for dizziness, syncope, weakness and headaches. Hematological: Negative for adenopathy. Does not bruise/bleed easily. Psychiatric/Behavioral: Negative for confusion and suicidal ideas.          PAST MEDICAL HISTORY    has a past medical history of Blind one eye, Chronic kidney disease, Diabetes mellitus ([facility] and Hypertension. SURGICAL HISTORY      has a past surgical history that includes Leg amputation below knee (Right, 08/2018); hernia repair; tumor excision; and Kidney transplant (N/A). CURRENT MEDICATIONS       Previous Medications    ACETAMINOPHEN (TYLENOL) 500 MG TABLET    Take 500 mg by mouth every 6 hours as needed for Pain    ALBUTEROL (PROVENTIL) (2.5 MG/3ML) 0.083% NEBULIZER SOLUTION    Take 2.5 mg by nebulization every 6 hours as needed for Wheezing    AMLODIPINE (NORVASC) 5 MG TABLET    Take 5 mg by mouth daily    APIXABAN (ELIQUIS) 2.5 MG TABS TABLET    Take by mouth 2 times daily    ASPIRIN (ASPIRIN 81) 81 MG EC TABLET    Take by mouth    ASPIRIN 81 MG TABLET    Take 81 mg by mouth daily    ATORVASTATIN (LIPITOR) 20 MG TABLET    Take 40 mg by mouth daily     BISACODYL (DULCOLAX) 10 MG SUPPOSITORY    Place 10 mg rectally daily    BUMETANIDE (BUMEX) 1 MG TABLET    Take 2 mg by mouth 2 times daily    COLCHICINE (COLCRYS) 0.6 MG TABLET    Take 0.6 mg by mouth daily    DORZOLAMIDE (TRUSOPT) 2 % OPHTHALMIC SOLUTION    2 drops 3 times daily    DORZOLAMIDE-TIMOLOL (COSOPT) 22.3-6.8 MG/ML OPHTHALMIC SOLUTION        ERTAPENEM (INVANZ) 1 GM INJECTION    Inject 1,000 mg into the muscle every 24 hours    FUROSEMIDE (LASIX) 40 MG TABLET    Take 40 mg by mouth 2 times daily    GABAPENTIN (NEURONTIN) 300 MG CAPSULE    Take 300 mg by mouth 3 times daily. .    INSULIN GLARGINE (BASAGLAR KWIKPEN) 100 UNIT/ML INJECTION PEN    Inject into the skin nightly    INSULIN LISPRO (HUMALOG) 100 UNIT/ML INJECTION VIAL    Inject 15 Units into the skin 3 times daily (before meals)     INSULIN LISPRO (HUMALOG) 100 UNIT/ML INJECTION VIAL    Inject into the skin 3 times daily (before meals)    METOPROLOL TARTRATE (LOPRESSOR) 25 MG TABLET    Take 25 mg by mouth 2 times daily    POLYETHYLENE GLYCOL (GLYCOLAX) 17 G PACKET    Take 17 g by mouth daily as needed for Constipation    PREDNISONE (DELTASONE) 5 MG TABLET    Take 5 mg by mouth daily    SENNOSIDES-DOCUSATE SODIUM (SENOKOT-S) 8.6-50 MG TABLET    Take 1 tablet by mouth daily    TAMSULOSIN (FLOMAX) 0.4 MG CAPSULE    Take 0.4 mg by mouth daily       ALLERGIES     is allergic to contrast  [iodides]. FAMILY HISTORY     has no family status information on file. family history is not on file. SOCIAL HISTORY      reports that he has never smoked. He has never used smokeless tobacco. He reports that he does not drink alcohol or use drugs. PHYSICAL EXAM     INITIAL VITALS:  weight is 101.6 kg (224 lb). His axillary temperature is 98.1 °F (36.7 °C). His blood pressure is 157/64 (abnormal) and his pulse is 74. His respiration is 14 and oxygen saturation is 92%. Physical Exam  Constitutional:       Appearance: He is well-developed. HENT:      Head: Normocephalic and atraumatic. Right Ear: External ear normal.      Left Ear: External ear normal.   Eyes:      Pupils: Pupils are equal, round, and reactive to light. Neck:      Musculoskeletal: Normal range of motion and neck supple. Cardiovascular:      Rate and Rhythm: Normal rate and regular rhythm. Pulmonary:      Effort: Pulmonary effort is normal.      Breath sounds: Examination of the right-lower field reveals rales. Examination of the left-lower field reveals rales. Rales present. No decreased breath sounds. Abdominal:      General: Bowel sounds are normal.      Palpations: Abdomen is soft. Musculoskeletal: Normal range of motion. Comments: Patient has a PICC line in his right upper arm he has bilateral below the knee amputations  He has a sacral decubitus on the right to midline   Skin:     General: Skin is warm and dry. Neurological:      General: No focal deficit present. Mental Status: He is alert.       Comments: Patient is oriented to place and person   Psychiatric:         Behavior: Behavior normal.           DIFFERENTIAL DIAGNOSIS/ MDM:     Exacerbation of COPD CHF rule out pneumonia or Covid    DIAGNOSTIC RESULTS     EKG: All EKG's are interpreted by the Emergency Department Physician who either signs or Co-signs this chart in the absence of a cardiologist.    Atrial flutter with 3 1 conduction rate of 72 bpm he has a history of a flutter QRS durations 106 ms QT corrected 431 ms axis is -16 is no acute ST elevation he does have some nonspecific ST and T wave changes I do not have an old EKG to compare this with    RADIOLOGY:   Non-plain film images such as CT, Ultrasound and MRI are read by the radiologist. Navneet Smoker radiographic images are visualized and the radiologist interpretations are reviewed as follows:        ONE XRAY VIEW OF THE CHEST         10/24/2020 1:30 pm         COMPARISON:    None.         HISTORY:    ORDERING SYSTEM PROVIDED HISTORY: sob    TECHNOLOGIST PROVIDED HISTORY:    sob    Reason for Exam: increased shortness of breath today    Acuity: Acute    Type of Exam: Initial    Additional signs and symptoms: NA    Relevant Medical/Surgical History: hypertension         FINDINGS:    Cardiac silhouette is enlarged.  Mediastinal contours normal.  Diffuse    perihilar and infrahilar predominant bilateral pulmonary ground-glass opacity    with small layering pleural effusions.  No pneumothorax is seen.  No acute    osseous abnormality is evident.              Impression    Cardiomegaly with hydrostatic edema and small layering pleural effusions in    keeping with acute congestive heart failure.               LABS:  Results for orders placed or performed during the hospital encounter of 10/24/20   CBC Auto Differential   Result Value Ref Range    WBC 6.6 3.5 - 11.0 k/uL    RBC 3.04 (L) 4.5 - 5.9 m/uL    Hemoglobin 8.8 (L) 13.5 - 17.5 g/dL    Hematocrit 29.4 (L) 41 - 53 %    MCV 97.0 80 - 100 fL    MCH 28.9 26 - 34 pg    MCHC 29.8 (L) 31 - 37 g/dL    RDW 20.2 (H) 12.5 - 15.4 %    Platelets 900 520 - 767 k/uL    MPV 9.8 6.0 - 12.0 fL    NRBC Automated NOT REPORTED per 100 WBC    Differential Type NOT REPORTED     Immature Granulocytes NOT REPORTED 0 %    Absolute Immature Granulocyte NOT REPORTED 0.00 - 0.30 k/uL    WBC Morphology NOT REPORTED     RBC Morphology NOT REPORTED     Platelet Estimate NOT REPORTED     Seg Neutrophils 92 (H) 36 - 66 %    Lymphocytes 5 (L) 24 - 44 %    Monocytes 3 1 - 7 %    Eosinophils % 0 (L) 1 - 4 %    Basophils 0 0 - 2 %    Segs Absolute 6.07 1.8 - 7.7 k/uL    Absolute Lymph # 0.33 (L) 1.0 - 4.8 k/uL    Absolute Mono # 0.20 0.1 - 0.8 k/uL    Absolute Eos # 0.00 0.0 - 0.4 k/uL    Basophils Absolute 0.00 0.0 - 0.2 k/uL    Morphology ANISOCYTOSIS PRESENT     Morphology HYPOCHROMIA PRESENT    Comprehensive Metabolic Panel   Result Value Ref Range    Glucose 118 (H) 70 - 99 mg/dL    BUN 63 (H) 8 - 23 mg/dL    CREATININE 1.80 (H) 0.70 - 1.20 mg/dL    Bun/Cre Ratio NOT REPORTED 9 - 20    Calcium 9.8 8.6 - 10.4 mg/dL    Sodium 148 (H) 135 - 144 mmol/L    Potassium 4.5 3.7 - 5.3 mmol/L    Chloride 111 (H) 98 - 107 mmol/L    CO2 29 20 - 31 mmol/L    Anion Gap 8 (L) 9 - 17 mmol/L    Alkaline Phosphatase 109 40 - 129 U/L    ALT 22 5 - 41 U/L    AST 14 <40 U/L    Total Bilirubin 0.80 0.3 - 1.2 mg/dL    Total Protein 7.2 6.4 - 8.3 g/dL    Alb 3.4 (L) 3.5 - 5.2 g/dL    Albumin/Globulin Ratio 0.9 (L) 1.0 - 2.5    GFR Non- 37 (L) >60 mL/min    GFR  45 (L) >60 mL/min    GFR Comment          GFR Staging NOT REPORTED    Protime-INR   Result Value Ref Range    Protime 13.2 (H) 9.4 - 12.6 sec    INR 1.3    Lactic Acid   Result Value Ref Range    Lactic Acid 0.6 0.5 - 2.2 mmol/L   Troponin   Result Value Ref Range    Troponin, High Sensitivity PENDING 0 - 22 ng/L    Troponin T NOT REPORTED <0.03 ng/mL    Troponin Interp NOT REPORTED    Brain Natriuretic Peptide   Result Value Ref Range    Pro-BNP 6,604 (H) <300 pg/mL    BNP Interpretation Pro-BNP Reference Range:    COVID-19    Specimen: Other   Result Value Ref Range    SARS-CoV-2          SARS-CoV-2, Rapid Not Detected Not Detected    Source . NASOPHARYNGEAL SWAB     SARS-CoV-2         EKG 12 Lead   Result Value Ref Range    Ventricular Rate 72 BPM    Atrial Rate 216 BPM    QRS Duration 106 ms    Q-T Interval 394 ms    QTc Calculation (Bazett) 431 ms    P Axis -14 degrees    R Axis -16 degrees    T Axis 96 degrees       Noted BNP, evidence of acute renal injury as well his creatinine is increased to 1.8    EMERGENCY DEPARTMENT COURSE:   Vitals:    Vitals:    10/24/20 1410 10/24/20 1429 10/24/20 1502 10/24/20 1522   BP: (!) 144/66 (!) 148/63 (!) 163/62 (!) 157/64   Pulse: 72 72 77 74   Resp: 10 10 11 14   Temp:       TempSrc:       SpO2:  96% 92% 92%   Weight:         -------------------------  BP: (!) 157/64, Temp: 98.1 °F (36.7 °C), Pulse: 74, Resp: 14      Patient had a previous admission to [facility] we contacted them they are closed all transfers currently, as patient has good good potential for decline I we want to transfer her immediately from our freestanding ER decision was made to send to the emergency department at [address] [place] [address] where they have more resources    CONSULTS: ER physician at [address] was consulted,      PROCEDURES:  None    FINAL IMPRESSION      1. Acute on chronic congestive heart failure, unspecified heart failure type ([facility]    2. Acute kidney injury ([facility]    3. History of renal transplant          DISPOSITION/PLAN   I transferred to [address] [place] [address] in stabilized condition    PATIENT REFERRED TO:  No follow-up provider specified. DISCHARGE MEDICATIONS:  New Prescriptions    No medications on file       (Please note that portions of this note were completed with a voice recognition program.  Efforts were made to edit the dictations but occasionally words are mis-transcribed.)    Mancuso MD, F.A.A.E.M.   Attending Emergency Medicine Physician      Shanna Del Toro MD  10/24/20 [id number]

## 2020-10-24 NOTE — ED NOTES
[facility] notified of pt transfer - pt departed via EMS en route to [address], RN  10/24/20 8023

## 2020-10-24 NOTE — ED TRIAGE NOTES
PT resident of [facility]- 911 called for transport for pt c/o increased shortness of breath. Pt Upon arrival states he was recently discharged from the hospital for diabetes. Upon arrival to ER room 8 pt O 2 sat 91-93 with 3 lpm via NC Pt has irregular breathing , ETC02 14-45 mmhg .  Pt decreased to 88% and placed on NRB

## 2020-10-24 NOTE — ED NOTES
RN from [facility] called to report elevated troponin at their facility that just resulted. Repeat troponin ordered for here.      Rufina Kraus RN  10/24/20 2424

## 2020-10-24 NOTE — ED PROVIDER NOTES
STVZ 4B STEPDOWN  Emergency Department Encounter  EmergencyMedicine Resident     Pt Britt Serrano  MRN: [id number]  [place] 1945  Date of evaluation: 10/24/20  PCP:  Jessica Eng MD    [address] [place] [address]       Chief Complaint   Patient presents with    Shortness of Breath       HISTORY OF PRESENT ILLNESS  (Location/Symptom, Timing/Onset, Context/Setting, Quality, Duration, Modifying Factors, Severity.)      Anthony Vargas is a 66 y.o. male history right eye blindness, CKD kidney transplant, diabetes mellitus, hypertension, COPD, CHF who presents with acute onset CHF exacerbation shortness of breath. Transferred from [facility] emergency department for admission treated with 40 mg IV Lasix prior to transport. Santos placed for strict I's and O's monitoring. History bilateral below the knee amputations for diabetes. Initial chief complaint shortness of breath. Patient typically follows with [address] given his transplant history however most facilities are on bypass and subsequently transferred to [address] [place] [address]. No acute changes in route. Patient does not feel symptomatically improved after Lasix. Not associated with fever, chills, nausea, vomiting, chest pain, constipation or diarrhea. Transferred for admission for acute CHF. PAST MEDICAL / SURGICAL / SOCIAL / FAMILY HISTORY      has a past medical history of Blind one eye, Chronic kidney disease, Diabetes mellitus ([address] and Hypertension. has a past surgical history that includes Leg amputation below knee (Right, 08/2018); hernia repair; tumor excision; and Kidney transplant (N/A).     Social History     Socioeconomic History    Marital status:      Spouse name: Not on file    Number of children: Not on file    Years of education: Not on file    Highest education level: Not on file   Occupational History    Not on file   Social Needs    Financial resource strain: Not on file   Hermilo-Catherine Constipation    Historical Provider, MD   metoprolol tartrate (LOPRESSOR) 25 MG tablet Take 25 mg by mouth 2 times daily    Historical Provider, MD   sennosides-docusate sodium (SENOKOT-S) 8.6-50 MG tablet Take 1 tablet by mouth daily    Historical Provider, MD   predniSONE (DELTASONE) 5 MG tablet Take 5 mg by mouth daily    Historical Provider, MD   tamsulosin (FLOMAX) 0.4 MG capsule Take 0.4 mg by mouth daily    Historical Provider, MD   dorzolamide-timolol (COSOPT) 22.3-6.8 MG/ML ophthalmic solution  9/3/18   Historical Provider, MD   acetaminophen (TYLENOL) 500 MG tablet Take 500 mg by mouth every 6 hours as needed for Pain    Historical Provider, MD   amLODIPine (NORVASC) 5 MG tablet Take 5 mg by mouth daily    Historical Provider, MD   aspirin 81 MG tablet Take 81 mg by mouth daily    Historical Provider, MD   atorvastatin (LIPITOR) 20 MG tablet Take 40 mg by mouth daily     Historical Provider, MD   insulin glargine (BASAGLAR KWIKPEN) 100 UNIT/ML injection pen Inject into the skin nightly    Historical Provider, MD   bisacodyl (DULCOLAX) 10 MG suppository Place 10 mg rectally daily    Historical Provider, MD   bumetanide (BUMEX) 1 MG tablet Take 2 mg by mouth 2 times daily    Historical Provider, MD   colchicine (COLCRYS) 0.6 MG tablet Take 0.6 mg by mouth daily    Historical Provider, MD   gabapentin (NEURONTIN) 300 MG capsule Take 300 mg by mouth 3 times daily. Jaclyn Lenz Historical Provider, MD   insulin lispro (HUMALOG) 100 UNIT/ML injection vial Inject 15 Units into the skin 3 times daily (before meals)     Historical Provider, MD   insulin lispro (HUMALOG) 100 UNIT/ML injection vial Inject into the skin 3 times daily (before meals)    Historical Provider, MD       REVIEW OF SYSTEMS    (2-9 systems for level 4, 10 or more for level 5)      Review of Systems   Constitutional: Negative for fever. HENT: Negative for sore throat. Eyes: Negative for visual disturbance.    Respiratory: Positive for shortness of breath. Cardiovascular: Negative for chest pain. Gastrointestinal: Negative for abdominal pain, constipation, diarrhea, nausea and vomiting. Genitourinary: Negative for decreased urine volume. Musculoskeletal: Negative for arthralgias and myalgias. Skin: Negative for wound. Neurological: Negative for weakness, light-headedness and headaches. Psychiatric/Behavioral: Negative for confusion. PHYSICAL EXAM   (up to 7 for level 4, 8 or more for level 5)      INITIAL VITALS:   BP (!) 148/78   Pulse 78   Temp 97.5 °F (36.4 °C) (Axillary)   Resp 14   Ht 6' 1\" (1.854 m)   Wt 224 lb (101.6 kg)   SpO2 96%   BMI 29.55 kg/m²     Physical Exam  Vitals signs and nursing note reviewed. Constitutional:       General: He is not in acute distress. Appearance: Normal appearance. He is well-developed. He is ill-appearing (Chronically). HENT:      Head: Normocephalic and atraumatic. Right Ear: External ear normal.      Left Ear: External ear normal.      Nose: Nose normal.   Eyes:      General:         Right eye: No discharge. Left eye: No discharge. Pupils: Pupils are equal, round, and reactive to light. Neck:      Musculoskeletal: Normal range of motion. Trachea: Trachea normal. No tracheal deviation. Cardiovascular:      Rate and Rhythm: Normal rate and regular rhythm. Comments: Distant heart sounds  Pulmonary:      Effort: Pulmonary effort is normal.      Breath sounds: No stridor. Decreased breath sounds present. No wheezing, rhonchi or rales. Abdominal:      Palpations: Abdomen is soft. Tenderness: There is no abdominal tenderness. There is no guarding. Musculoskeletal: Normal range of motion. General: No deformity. Feet:      Right foot:      Amputation: Right leg is amputated below knee. Left foot:      Amputation: Left leg is amputated below knee. Skin:     General: Skin is warm and dry.       Capillary Refill: Capillary refill takes less than 2 seconds. Neurological:      Mental Status: He is alert and oriented to person, place, and time.    Psychiatric:         Behavior: Behavior normal.         DIFFERENTIAL  DIAGNOSIS     PLAN (LABS / IMAGING / EKG):  Orders Placed This Encounter   Procedures    Blood Gas, Venous    Troponin    Basic Metabolic Panel w/ Reflex to MG    CBC auto differential    Troponin    APTT    URINALYSIS WITH MICROSCOPIC    Basic Metabolic Panel w/ Reflex to MG    BLOOD GAS, VENOUS    DIET CARDIAC; Daily Fluid Restriction: 1500 ml    Telemetry Monitoring    Vital signs per unit routine    Vital signs (specify frequency)    Notify physician    Up as tolerated    Up with assistance    Daily weights    Intake and output    Neurovascular checks    Place intermittent pneumatic compression device    Strict intake and output    HYPOGLYCEMIA TREATMENT: blood glucose less than 50 mg/dL and patient  ALERT and TOLERATING PO    HYPOGLYCEMIA TREATMENT: blood glucose less than 70 mg/dL and patient ALERT and TOLERATING PO    HYPOGLYCEMIA TREATMENT: blood glucose less than 70 mg/dL and patient NOT ALERT or NPO    Full Code    Inpatient consult to Internal Medicine    Inpatient consult to Case Management    Inpatient consult to Cardiology    OT eval and treat    PT evaluation and treat    BIPAP    Initiate Oxygen Therapy Protocol    POCT glucose    POCT glucose    POCT Glucose    POC Glucose Fingerstick    Wound ostomy eval and treat    PATIENT STATUS (FROM ED OR OR/PROCEDURAL) Inpatient       MEDICATIONS ORDERED:  Orders Placed This Encounter   Medications    nitroGLYCERIN 50 mg in dextrose 5% 250 mL infusion    bumetanide (BUMEX) injection 2 mg    atorvastatin (LIPITOR) tablet 40 mg    tamsulosin (FLOMAX) capsule 0.4 mg    sennosides-docusate sodium (SENOKOT-S) 8.6-50 MG tablet 1 tablet    sodium chloride flush 0.9 % injection 10 mL    sodium chloride flush 0.9 % injection 10 mL    OR Linked Order Group     acetaminophen (TYLENOL) tablet 650 mg     acetaminophen (TYLENOL) suppository 650 mg    polyethylene glycol (GLYCOLAX) packet 17 g    OR Linked Order Group     promethazine (PHENERGAN) tablet 12.5 mg     ondansetron (ZOFRAN) injection 4 mg    heparin (porcine) injection 4,000 Units    heparin (porcine) injection 4,000 Units    heparin (porcine) injection 2,000 Units    heparin 25,000 units in dextrose 5% 250 mL infusion    amLODIPine (NORVASC) tablet 10 mg    metoprolol tartrate (LOPRESSOR) tablet 25 mg    insulin lispro (HUMALOG) injection vial 0-18 Units    insulin lispro (HUMALOG) injection vial 0-9 Units    insulin glargine (LANTUS) injection vial 20 Units    glucose (GLUTOSE) 40 % oral gel 15 g    dextrose 50 % IV solution    glucagon (rDNA) injection 1 mg    dextrose 5 % solution       DDX: CHF exacerbation versus ACS versus other    DIAGNOSTIC RESULTS / EMERGENCY DEPARTMENT COURSE / MDM     LABS:  Results for orders placed or performed during the hospital encounter of 10/24/20   Blood Gas, Venous   Result Value Ref Range    pH, [place] 7.265 (L) 7.320 - 7.420    pCO2, [place] 65.9 (H) 39 - 55    pO2, [place] 43.2 30 - 50    HCO3, Venous 28.9 24 - 30 mmol/L    Positive Base Excess, [place] 1.7 0.0 - 2.0 mmol/L    Negative Base Excess, [place] NOT REPORTED 0.0 - 2.0 mmol/L    O2 Sat, [place] 69.7 60.0 - 85.0 %    Total Hb NOT REPORTED 12.0 - 16.0 g/dl    Oxyhemoglobin NOT REPORTED 95.0 - 98.0 %    Carboxyhemoglobin 1.5 0 - 5 %    Methemoglobin NOT REPORTED 0.0 - 1.5 %    Pt Temp 37.0     pH, [place], Temp Adj NOT REPORTED 7.320 - 7.420    pCO2, [place], Temp Adj NOT REPORTED 39 - 55 mmHg    pO2, [place], Temp Adj NOT REPORTED 30 - 50 mmHg    O2 Device/Flow/% NOT REPORTED     Respiratory Rate NOT REPORTED     Eusebio Test NOT REPORTED     Sample Site NOT REPORTED     Pt.  Position NOT REPORTED     Mode NOT REPORTED     Set Rate NOT REPORTED     Total Rate NOT REPORTED     VT NOT REPORTED     FIO2 INFORMATION NOT PROVIDED     Peep/Cpap NOT REPORTED     PSV NOT REPORTED     Text for Respiratory NOT REPORTED     NOTIFICATION NOT REPORTED     NOTIFICATION TIME NOT REPORTED    Troponin   Result Value Ref Range    Troponin, High Sensitivity 221 (HH) 0 - 22 ng/L    Troponin T NOT REPORTED <0.03 ng/mL    Troponin Interp NOT REPORTED    APTT   Result Value Ref Range    PTT 30.1 20.5 - 30.5 sec   Basic Metabolic Panel w/ Reflex to MG   Result Value Ref Range    Glucose 71 70 - 99 mg/dL    BUN 63 (H) 8 - 23 mg/dL    CREATININE 1.48 (H) 0.70 - 1.20 mg/dL    Bun/Cre Ratio NOT REPORTED 9 - 20    Calcium 9.6 8.6 - 10.4 mg/dL    Sodium 146 (H) 135 - 144 mmol/L    Potassium 4.3 3.7 - 5.3 mmol/L    Chloride 110 (H) 98 - 107 mmol/L    CO2 27 20 - 31 mmol/L    Anion Gap 9 9 - 17 mmol/L    GFR Non-African American 46 (L) >60 mL/min    GFR  56 (L) >60 mL/min    GFR Comment          GFR Staging NOT REPORTED    BLOOD GAS, VENOUS   Result Value Ref Range    pH, [place] 7.334 7.320 - 7.420    pCO2, [place] 52.3 39 - 55    pO2, [place] 106.0 (H) 30 - 50    HCO3, Venous 27.1 24 - 30 mmol/L    Positive Base Excess, [place] 1.4 0.0 - 2.0 mmol/L    Negative Base Excess, [place] NOT REPORTED 0.0 - 2.0 mmol/L    O2 Sat, [place] 98.2 (H) 60.0 - 85.0 %    Total Hb NOT REPORTED 12.0 - 16.0 g/dl    Oxyhemoglobin NOT REPORTED 95.0 - 98.0 %    Carboxyhemoglobin 1.4 0 - 5 %    Methemoglobin NOT REPORTED 0.0 - 1.5 %    Pt Temp 37.0     pH, [place], Temp Adj NOT REPORTED 7.320 - 7.420    pCO2, [place], Temp Adj NOT REPORTED 39 - 55 mmHg    pO2, [place], Temp Adj NOT REPORTED 30 - 50 mmHg    O2 Device/Flow/% NOT REPORTED     Respiratory Rate NOT REPORTED     Eusebio Test NOT REPORTED     Sample Site NOT REPORTED     Pt.  Position NOT REPORTED     Mode NOT REPORTED     Set Rate NOT REPORTED     Total Rate NOT REPORTED     VT NOT REPORTED     FIO2 UNKNOWN     Peep/Cpap NOT REPORTED     PSV NOT REPORTED     Text for Respiratory NOT REPORTED     NOTIFICATION NOT REPORTED     NOTIFICATION TIME NOT REPORTED    POC Glucose Fingerstick   Result Value Ref Range    POC Glucose 82 75 - 110 mg/dL         RADIOLOGY:  Xr Chest Portable    Result Date: 10/24/2020  EXAMINATION: ONE XRAY VIEW OF THE CHEST 10/24/2020 1:30 pm COMPARISON: None. HISTORY: ORDERING SYSTEM PROVIDED HISTORY: sob TECHNOLOGIST PROVIDED HISTORY: sob Reason for Exam: increased shortness of breath today Acuity: Acute Type of Exam: Initial Additional signs and symptoms: NA Relevant Medical/Surgical History: hypertension FINDINGS: Cardiac silhouette is enlarged. Mediastinal contours normal.  Diffuse perihilar and infrahilar predominant bilateral pulmonary ground-glass opacity with small layering pleural effusions. No pneumothorax is seen. No acute osseous abnormality is evident. Cardiomegaly with hydrostatic edema and small layering pleural effusions in keeping with acute congestive heart failure. EKG  EKG Interpretation    Interpreted by me    Rhythm: Atrial flutter 3-1 conduction  Rate: normal  Axis: normal  Ectopy: none  Conduction: Incomplete left bundle branch block  ST Segments: Nonspecific  T Waves: Nonspecific  Q Waves: none    Clinical Impression: atrial flutter with 3-1 conduction, nonspecific EKG without prior available for comparison    All EKG's are interpreted by the Emergency Department Physician who either signs or Co-signs this chart in the absence of a cardiologist.    EMERGENCY DEPARTMENT COURSE:  Patient met in room with transport EMS, no acute distress, chronically ill-appearing but not toxic. Poor historian of mildly engaged in exam.  Physical exam notable for distant heart and breath sounds. Patient complaining of shortness of breath not improved after Lasix. As lab work has already been completed and diagnosis of CHF has been made will utilize nitroglycerin drip for symptomatic management. Admission plan discussed with internal medicine who agreed to admit the patient.   Troponin had and initially not resulted prior to transfer, subsequently resulted and was elevated at 230, repeat 221. Patient already admitted at time of lab results, internal medicine notified who would be consulting cardiology. Admission plan discussed with patient and spouse over the phone, med agreement, educated on likely hospitalization course with all questions answered everyone satisfaction. PROCEDURES:  None    CONSULTS:  IP CONSULT TO INTERNAL MEDICINE  IP CONSULT TO CARDIOLOGY  IP CONSULT TO CASE MANAGEMENT    CRITICAL CARE:  None    FINAL IMPRESSION      1. COPD exacerbation ([address]          DISPOSITION / PLAN     DISPOSITION Admitted 10/24/2020 04:42:02 PM      PATIENT REFERRED TO:  No follow-up provider specified.     DISCHARGE MEDICATIONS:  Current Discharge Medication List          Silas Morales MD  Emergency Medicine Resident    (Please note that portions of thisnote were completed with a voice recognition program.  Efforts were made to edit the dictations but occasionally words are mis-transcribed.)        Silas Morales MD  Resident  10/25/20 5313

## 2020-10-24 NOTE — ED NOTES
Bed: 16  Expected date:   Expected time:   Means of arrival:   Comments:  Jes Cardenas RN  10/24/20 9875

## 2020-10-24 NOTE — ED NOTES
Attempted to take pictures of wound to buttock. [address] [facility] [address] crew assisted.  Pt assisted to cot/ secured X 825 Ramos Araiza RN  10/24/20 4842

## 2020-10-24 NOTE — ED NOTES
Writer received call from lab. Critical trop of 230.  Writer relayed critical lab to ANETTE Rao RN  10/24/20 2236

## 2020-10-24 NOTE — ED NOTES
[facility] contacted to transfer pt to [address] ED     Abi Sutherland, ANETTE  10/24/20 3617

## 2020-10-24 NOTE — ED PROVIDER NOTES
[address] [facility]     Emergency Department     Faculty Attestation    I performed a history and physical examination of the patient and discussed management with the resident. I have reviewed and agree with the residents findings including all diagnostic interpretations, and treatment plans as written at the time of my review. Any areas of disagreement are noted on the chart. I was personally present for the key portions of any procedures. I have documented in the chart those procedures where I was not present during the key portions. For Physician Assistant/ Nurse Practitioner cases/documentation I have personally evaluated this patient and have completed at least one if not all key elements of the E/M (history, physical exam, and MDM). Additional findings are as noted. This patient was evaluated in the Emergency Department for symptoms described in the history of present illness. The patient was evaluated in the context of the global COVID-19 pandemic, which necessitated consideration that the patient might be at risk for infection with the SARS-CoV-2 virus that causes COVID-19. Institutional protocols and algorithms that pertain to the evaluation of patients at risk for COVID-19 are in a state of rapid change based on information released by regulatory bodies including the CDC and federal and state organizations. These policies and algorithms were followed during the patient's care in the ED. Primary Care Physician: Ean Potter MD    History: This is a 66 y.o. male who presents to the Emergency Department with complaint of shortness of breath. The patient was seen outlying facility and diagnosed with congestive heart failure. Is transferred for further evaluation. Physical:   height is 6' 1\" (1.854 m) and weight is 224 lb (101.6 kg). His oral temperature is 98.6 °F (37 °C). His blood pressure is 145/66 (abnormal) and his pulse is 73.  His respiration is 20 and oxygen saturation is 93%. Ms. breath sounds auscultated bilaterally, heart regular rhythm, abdomen is soft obese nontender    Impression: CHF    Plan: Patient is received Lasix will be placed on a low-dose nitroglycerin drip and admitted. (Please note that portions of this note were completed with a voice recognition program.  Efforts were made to edit the dictations but occasionally words are mis-transcribed.)    Akshat Gabriel.  Tabitha Gill MD, [facility]  Attending Emergency Medicine Physician        Kelvin Olivarez MD  10/24/20 0207

## 2020-10-25 PROBLEM — Z89.512 S/P BILATERAL BKA (BELOW KNEE AMPUTATION) (HCC): Status: ACTIVE | Noted: 2020-01-01

## 2020-10-25 PROBLEM — L89.159 SACRAL DECUBITUS ULCER: Status: ACTIVE | Noted: 2020-01-01

## 2020-10-25 PROBLEM — N30.01 ACUTE CYSTITIS WITH HEMATURIA: Status: ACTIVE | Noted: 2020-01-01

## 2020-10-25 PROBLEM — Z89.511 S/P BILATERAL BKA (BELOW KNEE AMPUTATION) (HCC): Status: ACTIVE | Noted: 2020-01-01

## 2020-10-25 NOTE — H&P
Attending Physician Statement  I have discussed the case of Don De La Cruz, including pertinent history and exam findings with the resident/fellow/NP/PA. I have seen and examined the patient and the key elements of the encounter have been performed by me. I agree with the assessment, plan and orders as documented by the resident/fellow/NP/PA  With changes made to the note as needed. Pt was seen during rounds. Review of Systems:   In addition to the pertinent positives and negatives as stated within HPI and the review of systems as documented in their notes, all other systems were reviewed when able to and are reported negative. Patient admitted with shortness of breath. Patient with history of renal transplant, congestive heart failure, chronic diastolic and COPD on home oxygen with pulmonary artery hypertension. Patient was recently discharged after treatment for CHF and pneumonia. Patient apparently required intubation. With elevated proBNP and troponin it is possible patient could be having acute on chronic diastolic congestive heart failure contributing to acute on chronic hypoxemic and hypercarbic respiratory failure-continue diuresis, use noninvasive ventilation, consult cardiology as the troponins were significantly elevated. Concern would be for non-ST elevated myocardial infarction    Acute on chronic hypercarbic and hypoxemic respiratory failure-noninvasive ventilation, supplemental oxygen    Acute toxic metabolic encephalopathy. Could be related to hypercarbia or infection from sacral decubitus or? UTI-start broad-spectrum antibiotics and adjust them based on the culture data    S/p renal transplant with CYNTHIA, improving-continue immunosuppressant medications.   Consult nephrology if needed    COPD-continue bronchodilators    Type 2 diabetes mellitus-monitor blood sugars and cover with insulin    Bilateral below the knee amputation-observe    Atrial fibrillation/flutter on chronic anticoagulation-continue anticoagulation and beta-blockers    Sacral decubitus-wound care    Hematuria-continue to monitor as the patient is on anticoagulants. If persistent will consult urology    Hypernatremia-monitor as the patient is diuresed    Anemia, normochromic normocytic? Cause.   Could be related to renal transplantation along with immunosuppression and chronic illness-review previous records and if it is chronic, continue to monitor    Blindness in the right eye-observe    Essential hypertension-monitor blood pressure and cover with medications    Allergy to intravenous contrast-avoid    Acute on chronic respiratory acidosis-noninvasive ventilation    Obstructive sleep apnea-continue noninvasive ventilation during sleep after the acute episode is improved    Resume Eliquis if no more hematuria          Armida Boeck  10/25/2020  5:21 AM

## 2020-10-25 NOTE — CARE COORDINATION
Attempted to met with patient to discuss transition plans. Patient is on continuous Bipap at this time.   Will return once patient is able to communicate with case management

## 2020-10-25 NOTE — CONSULTS
Renal Consult Note    Patient :  Ellie Hashimoto; 66 y.o. MRN# [id number]  [facility]  Attending:  Cedric Grey MD  Admit Date:  10/24/2020   Hospital Day: 1    Reason for Consult:     Asked by Dr Cedric Grey MD to see for CYNTHIA/Elevated Creatinine. History Obtained From:     Chart    History of Present Illness: Ellie Hashimoto; 66 y.o. male with past medical history of ESRD s/p  donor renal transplant in , DM II, CKD III, HTN, atrial fibrillation, recent herpes zoster infection, and bilateral BKA. His cause of ESRD was secondary to diabetes and HTN. Baseline creatinine is reported as 1.3, and is managed by [facility] Dr Elin Causey. His outpatient immunosuppressive regimen includes Tacrolimus 3mg BID, Myfortic 540 BID, and prednisone 5mg daily. He was recently discharged from [facility] after an acute diastolic heart failure episode requiring intubation. On discharge his creatinine was 1.51. He was discharged on Lasix 40mg BID. Per report, he was doing well up until yesterday when he developed respiratory distress at the SNF. He presented to the ER from [facility] due to acute respiratory failure, and confusion. He was given Lasix 40mg IV X1 in route. CXR consistent with CHF. He is now receiving Bumex 2mg IV BID. Hgb 8.8, WBC 6.6, Troponin 221, proBNP 6604, EKG atrial flutter. His creatinine on admission was 1.80, BUN 63. This morning creatinine 1.58. His urine output  Is 2175. Urine creatinine 24, urine total protein creatinine ratio 3.25  This morning due to increasing agitation, he was placed on BiPap. Family is requesting transfer to [facility]. UA showed 2+ protein, large leukoycte esterase. He has a chronic levine. No history of recent contrast exposure  He was receiving Vanco while at [facility] earlier this month     Past History/Allergies? Social History:     Past Medical History:   Diagnosis Date    Blind one eye     Right    Chronic kidney disease     Diabetes mellitus ([facility] [address]     Hypertension        Allergies   Allergen Reactions    Contrast  [Iodides] Hives       Social History     Socioeconomic History    Marital status:      Spouse name: Not on file    Number of children: Not on file    Years of education: Not on file    Highest education level: Not on file   Occupational History    Not on file   Social Needs    Financial resource strain: Not on file    Food insecurity     Worry: Not on file     Inability: Not on file    Transportation needs     Medical: Not on file     Non-medical: Not on file   Tobacco Use    Smoking status: Never Smoker    Smokeless tobacco: Never Used   Substance and Sexual Activity    Alcohol use: No    Drug use: No    Sexual activity: Not on file   Lifestyle    Physical activity     Days per week: Not on file     Minutes per session: Not on file    Stress: Not on file   Relationships    Social connections     Talks on phone: Not on file     Gets together: Not on file     Attends Congregational service: Not on file     Active member of club or organization: Not on file     Attends meetings of clubs or organizations: Not on file     Relationship status: Not on file    Intimate partner violence     Fear of current or ex partner: Not on file     Emotionally abused: Not on file     Physically abused: Not on file     Forced sexual activity: Not on file   Other Topics Concern    Not on file   Social History Narrative    Not on file       Family History:      History reviewed. No pertinent family history.     Outpatient Medications:     Medications Prior to Admission: aspirin (ASPIRIN 81) 81 MG EC tablet, Take by mouth  furosemide (LASIX) 40 MG tablet, Take 40 mg by mouth 2 times daily  ertapenem (INVANZ) 1 GM injection, Inject 1,000 mg into the muscle every 24 hours  apixaban (ELIQUIS) 2.5 MG TABS tablet, Take by mouth 2 times daily  dorzolamide (TRUSOPT) 2 % ophthalmic solution, 2 drops 3 times daily  albuterol (PROVENTIL) mg Oral BID     Continuous Infusions:    heparin (PORCINE) Infusion 11.9 Units/kg/hr (10/25/20 0352)    dextrose       PRN Meds:  sodium chloride flush, acetaminophen **OR** acetaminophen, polyethylene glycol, promethazine **OR** ondansetron, heparin (porcine), heparin (porcine), glucose, dextrose, glucagon (rDNA), dextrose    Review of Systems:     Constitutional: No fever, no chills, ++ lethargy, ++ weakness. HEENT:  No headache, otalgia, itchy eyes, nasal discharge or sore throat. Cardiac:  No chest pain, ++dyspnea, ++orthopnea ++ PND. Chest:              No cough, phlegm or wheezing. Abdomen:  No abdominal pain, nausea or vomiting. Neuro:  No focal weakness, abnormal movements or seizure like activity. Skin:   No rashes, no itching. :   No hematuria, no pyuria, no dysuria, no flank pain. Extremities:  No swelling or joint pains. ROS was otherwise negative except as mentioned in the [address] Input/Output:       I/O last 3 completed shifts: In: 632.4 [P.O.:500; I.V.:132.4]  Out: 2175 [Urine:2175]    Vital Signs:   Temperature:  Temp: 98.2 °F (36.8 °C)  TMax:   Temp (24hrs), Av.3 °F (36.8 °C), Min:97.5 °F (36.4 °C), Max:98.6 °F (37 °C)    Respirations:  Resp: 20  Pulse:   Pulse: 68  BP:    BP: (!) 112/53  BP Range: Systolic (33OBV), XCJ:646 , Min:112 , RBA:746       Diastolic (67XCE), VKO:73, Min:50, Max:95      Physical Examination:     General:  More alert on BiPap. Nods yes and no  HEENT: Atraumatic, normocephalic, no throat congestion, moist mucosa. Eyes:   Pupils equal, round and reactive to light, EOMI. Neck:   No JVD, no thyromegaly, no lymphadenopathy. Chest:   Bilateral vesicular breath sounds, no rales or wheezes. Cardiac:  S1 S2 irregular, soft murmur, no gallops or rubs, JVP not raised. Abdomen: Soft, non-tender, non distended, BS audible. :   No suprapubic or flank tenderness. SKIN:  No rashes, good skin turgor.   Extremities:  + edema, No clubbing, No cyanosis    Labs: Recent Labs     10/24/20  1328 10/25/20  0303   WBC 6.6 6.0   RBC 3.04* 2.89*   HGB 8.8* 8.4*   HCT 29.4* 31.0*   MCV 97.0 107.3*   MCH 28.9 29.1   MCHC 29.8* 27.1*   RDW 20.2* 18.7*    See Reflexed IPF Result   MPV 9.8 NOT REPORTED      BMP:   Recent Labs     10/24/20  1328 10/24/20  2312 10/25/20  0303   * 146* 140   K 4.5 4.3 3.8   * 110* 107   CO2 29 27 23   BUN 63* 63* 60*   CREATININE 1.80* 1.48* 1.58*   GLUCOSE 118* 71 84   CALCIUM 9.8 9.6 9.3        Albumin:    Recent Labs     10/24/20  1328   LABALBU 3.4*     SPEP:  Lab Results   Component Value Date    PROT 7.2 10/24/2020    PROT 7.2 05/11/2017       Urinalysis/Chemistries:      Lab Results   Component Value Date    NITRU NEGATIVE 10/25/2020    COLORU YELLOW 10/25/2020    PHUR 6.5 10/25/2020    WBCUA TOO NUMEROUS TO COUNT 10/25/2020    RBCUA TOO NUMEROUS TO COUNT 10/25/2020    MUCUS NOT REPORTED 10/25/2020    TRICHOMONAS NOT REPORTED 10/25/2020    YEAST NOT REPORTED 10/25/2020    BACTERIA FEW 10/25/2020    SPECGRAV 1.009 10/25/2020    LEUKOCYTESUR LARGE 10/25/2020    UROBILINOGEN Normal 10/25/2020    BILIRUBINUR NEGATIVE 10/25/2020    GLUCOSEU NEGATIVE 10/25/2020    KETUA NEGATIVE 10/25/2020    AMORPHOUS NOT REPORTED 10/25/2020     Urine Sodium:     Lab Results   Component Value Date    GENNARO 85 10/25/2020       Lab Results   Component Value Date    LABCREA 24.3 10/25/2020     UPC:     Urine Eosinophils:  No components found for: UEOS    Radiology:     CXR:   FINDINGS:    Cardiac silhouette is enlarged.  Mediastinal contours normal.  Diffuse    perihilar and infrahilar predominant bilateral pulmonary ground-glass opacity    with small layering pleural effusions.  No pneumothorax is seen.  No acute    osseous abnormality is evident.              Impression    Cardiomegaly with hydrostatic edema and small layering pleural effusions in    keeping with acute congestive heart failure. Assessment:     1.   CKD III with history of ESRD s/p renal transplant from  donor in . Baseline 1.3-1.5. Currently at baseline  2. Acute diastolic heart failure with preserved EF 70%   3. Type 2 Diabetes  4. Recent Herpes Zoster infection early October  5. Atrial fibrillation    Plan:   1. Check Tacrolimus level  2. Resume home Tacrolimus 3mg BID, prednisone 5mg daily. Will d/w Dr Talia Ma   3. Resume myfortic 540mg BID  4. Daily labs  5. Continue Bumex 2mg IV BID    Nutrition   Please ensure that patient is on a renal diet/TF. Avoid nephrotoxic drugs/contrast exposure. Thank you for the consultation. Please do not hesitate to contact us for any further questions/concerns. We will continue to follow along with you. Vic Marcial [facility]  [facility]    Attending Physician Statement  I have discussed the care of [facility], including pertinent history and exam findings with the NP I have reviewed the key elements of all parts of the encounter with the np. Note was updated and recorded changes were made I have seen and examined the patient with the np. I agree with the assessment and plan and status of the problem list as documented. Patient was seen and examined. Consult discussed with nurse practitioner and reviewed. I was told by the nurses patient is going to be transferred to [address] [place] [address]. Patient earlier received Bumex. Urine output improved and he is no longer on BiPAP. He is saturating 95% by nasal cannula  On examination:  1. Patient was comfortable. He was able to answer my question in yes or no. He was receiving oxygen by nasal cannula. Neck: Neck was supple no carotid bruit  Chest: Bilateral air entry patient still has Rales at the bases  Abdomen: Soft and nontender bowel sounds was positive  Extremities patient has bilateral BKA  Labs reviewed  Impression:  1. Please give another dose of Bumex if there is a delay in transfer  2. Continue home medication  3.   Okay to transfer to [facility] [place] from renal standpoint   Addiitionally I recommend if there is a delay in transfer to  [facility]

## 2020-10-25 NOTE — CARE COORDINATION
Case Management Initial Discharge Plan  Ana Paula Vines,             Met with:patient and daughter Lashon Merirll discuss discharge plans. Information verified: address, contacts, phone number, , insurance Yes    Emergency Contact/Next of Kin name & number: Ellie Agustin [phone]    PCP: Ryann Quick MD  Date of last visit: unknown    Insurance Provider: medicare and [facility]    Discharge Planning    Living Arrangements:      Support Systems:       Home has 1 stories  0 stairs to climb to get into front door, 0stairs to climb to reach second floor  Location of bedroom/bathroom in home main    Patient able to perform ADL's:Assisted    Current Services (outpatient & in home) [facility]  DME equipment: attempting to get hospital bed and wheeelchair  DME provider:     Receiving oral anticoagulation therapy? Eliquis    If indicated:   Physician managing anticoagulation treatment: PCP  Where does patient obtain lab work for ATC treatment? Potential Assistance Needed:       Patient agreeable to home care: Yes  Freedom of choice provided:  yes    Prior SNF/Rehab Placement and Facility: yes  Agreeable to SNF/Rehab: Yes if unable to return home  Freedom of choice provided: yes     Evaluation: no    Expected Discharge date:       Patient expects to be discharged to: Follow Up Appointment: Best Day/ Time:      Transportation provider: daughter  Transportation arrangements needed for discharge: Yes    Readmission Risk              Risk of Unplanned Readmission:        27             Does patient have a readmission risk score greater than 14?: Yes  If yes, follow-up appointment must be made within 7 days of discharge. Goals of Care:  Better breathing      Discharge Plan: home with [facility] if DME can be delivered.             Electronically signed by Vera RN on 10/25/20 at 5:12 PM EDT

## 2020-10-25 NOTE — PROGRESS NOTES
Physical Therapy  DATE: 10/25/2020    NAME: Tete Hull  MRN: [id number]   : 1945    Patient not seen this date for Physical Therapy due to:  [] Blood transfusion in progress  [] Hemodialysis  []  Patient Declined  [] Spine Precautions   [] Strict Bedrest  [] Surgery/ Procedure  [] Testing      [x] Other   RN cx   Pt with resp difficulties        [] PT being discontinued at this time. Patient independent. No further needs. [] PT being discontinued at this time as the patient has been transferred to palliative care. No further needs.     Hasmukh Iglesias, PT

## 2020-10-25 NOTE — PROGRESS NOTES
Internal Medicine Teaching Service Senior Note      This is a 66 y.o. male admitted 10/24/2020 for Shortness of breath [R06.02]  Shortness of breath [R06.02]. Patient came in with Chief complaint of   Chief Complaint   Patient presents with    Shortness of Breath     History obtained from ER resident sign out and chart review. Patient was very somnolent during my evaluation. Per chart, patient had presented to [place] ED from [facility]) due to shortness of breath. He had recently been discharged from [facility] where he was managed for respiratory failure with intubation from CHF/pneumonia. His past medical history significant for chronic CHF, chronic respiratory failure secondary to COPD on 2 L O2, history of renal transplant with CKD, bilateral BKA, type 2 diabetes and hypertension. He received a dose of Lasix prior to transfer. Labs at [place] ED showed elevated creatinine 1.8, BUN 63 and Hb 8.8. He was later reported to have elevated troponin high-sensitivity 230, repeat was 221. EKG showed atrial flutter with 3 and 1 conduction. In the ER patient was started on nitro drip, heparin drip. Also received supplemental oxygen. See H&P of intern resident for more details. ER Course  Vitals:    10/25/20 0000   BP: (!) 148/78   Pulse: 78   Resp: 14   Temp: 97.5 °F (36.4 °C)   SpO2: 96%       BMP:   Recent Labs     10/24/20  1328 10/24/20  2312   * 146*   K 4.5 4.3   * 110*   CO2 29 27   BUN 63* 63*   CREATININE 1.80* 1.48*   GLUCOSE 118* 71     CBC: )  Recent Labs     10/24/20  1328   WBC 6.6   HGB 8.8*   HCT 29.4*           Other Labs showed elevated proBNP 6604    Imaging studies showed chest x-ray with cardiomegaly and pulmonary edema.       Assessment    Principal Problem:    Acute on chronic respiratory failure with hypoxia and hypercapnia (HCC)  Active Problems:    Shortness of breath    Acute respiratory acidosis    Acute on chronic

## 2020-10-25 NOTE — PROGRESS NOTES
[facility]  Internal Medicine Teaching Residency Program  Inpatient Daily Progress Note  ______________________________________________________________________________    Patient: Robert Hernández  YOB: 1945   CWN:[id number]    Acct: [de-identified]     Room: [id number]  Admit date: 10/24/2020  Today's date: 10/25/20  Number of days in the hospital: 1    SUBJECTIVE   Admitting Diagnosis: Acute on chronic respiratory failure with hypoxia and hypercapnia (HCC)  CC: Shortness of breath, confusion  Pt examined at bedside. Chart & results reviewed. Patient was confused, lethargic and agitated. Per nurse, he didn't have enough sleep last night. Also his hematuria was because of foleys catheter. Hemodynamicaaly and vitally he is stable   Afebrile   Bp: 125/50, HR: 73. Multiple PVC's noticed on the telemetry monitor. On Bipap saturation 95%      ROS: Unable to follow command. Constitutional:  negative for chills, fevers, sweats  Respiratory:  negative for cough, dyspnea on exertion, hemoptysis, shortness of breath, wheezing  Cardiovascular:  negative for chest pain, chest pressure/discomfort, lower extremity edema, palpitations  Gastrointestinal:  negative for abdominal pain, constipation, diarrhea, nausea, vomiting  Neurological:  negative for dizziness, headache  BRIEF HISTORY     70-year-old -American male with past medical history of renal failure s/p renal transplant (2012), congestive heart failure with preserved ejection fraction with greater than 70%, COPD on 2 L home oxygen, pulmonary hypertension with calculated RVSP 64 mmHg, diabetes mellitus type 2, hypertension, chronic immune suppression on Myfortic and tacrolimus, bilateral below-knee amputation, A. fib on Eliquis, presented to the emergency department with shortness of breath.   He was transferred from other hospital due to nonavailability of beds.      Patient is a poor historian, confused. Was recently discharge from [facility] after respiratory failure due to CHF and pneumonia where he was intubated. Reports of increasing shortness of breath, chest pain, swelling of the body. Denies fever, chills, abdominal pain, common cold. Patient wife states that her  reports back pain due to sacral decubitus    OBJECTIVE     Vital Signs:  BP (!) 125/50   Pulse 73   Temp 98.6 °F (37 °C) (Oral)   Resp 18   Ht 6' 1\" (1.854 m)   Wt 231 lb 7.7 oz (105 kg)   SpO2 99%   BMI 30.54 kg/m²     Temp (24hrs), Av.3 °F (36.8 °C), Min:97.5 °F (36.4 °C), Max:98.6 °F (37 °C)    In: 632.4   Out: 2175 [Urine:2175]    Physical Exam:  Constitutional: This is a well developed, well nourished, 25-29.9 - Overweight 66y.o. year old male who is alert, oriented, cooperative and in no apparent distress. Head:normocephalic and atraumatic. EENT:  PERRLA. No conjunctival injections. Septum was midline, mucosa was without erythema, exudates or cobblestoning. No thrush was noted. Neck: Supple without thyromegaly. No elevated JVP. Trachea was midline. Respiratory: Chest was symmetrical without dullness to percussion. Breath sounds bilaterally were clear to auscultation. There were no wheezes, rhonchi or rales. There is no intercostal retraction or use of accessory muscles. No egophony noted. Cardiovascular: Regular without murmur, clicks, gallops or rubs. Abdomen: Slightly rounded and soft without organomegaly. No rebound, rigidity or guarding was appreciated. Lymphatic: No lymphadenopathy. Musculoskeletal: Normal curvature of the spine. No gross muscle weakness. Extremities:  Upper extremity swelling, AV fistula on the left arm   Skin:  Warm and dry. Good color, turgor and pigmentation. No lesions or scars.   No cyanosis or clubbing  Neurological/Psychiatric: Confuse, unable to follow command      Medications:  Scheduled Medications:    insulin lispro  0-6 Units Subcutaneous TID WC  insulin lispro  0-3 Units Subcutaneous Nightly    insulin glargine  10 Units Subcutaneous Nightly    cefTRIAXone (ROCEPHIN) IV  1 g Intravenous Q24H    bumetanide  2 mg Intravenous BID    atorvastatin  40 mg Oral Nightly    tamsulosin  0.4 mg Oral Daily    sennosides-docusate sodium  1 tablet Oral Daily    sodium chloride flush  10 mL Intravenous 2 times per day    amLODIPine  10 mg Oral Nightly    metoprolol tartrate  25 mg Oral BID     Continuous Infusions:    heparin (PORCINE) Infusion 11.9 Units/kg/hr (10/25/20 0352)    dextrose       PRN Medicationssodium chloride flush, 10 mL, PRN  acetaminophen, 650 mg, Q6H PRN    Or  acetaminophen, 650 mg, Q6H PRN  polyethylene glycol, 17 g, Daily PRN  promethazine, 12.5 mg, Q6H PRN    Or  ondansetron, 4 mg, Q6H PRN  heparin (porcine), 4,000 Units, PRN  heparin (porcine), 2,000 Units, PRN  glucose, 15 g, PRN  dextrose, 12.5 g, PRN  glucagon (rDNA), 1 mg, PRN  dextrose, 100 mL/hr, PRN        Diagnostic Labs:  CBC:   Recent Labs     10/24/20  1328 10/25/20  0303   WBC 6.6 6.0   RBC 3.04* 2.89*   HGB 8.8* 8.4*   HCT 29.4* 31.0*   MCV 97.0 107.3*   RDW 20.2* 18.7*    See Reflexed IPF Result     BMP:   Recent Labs     10/24/20  1328 10/24/20  2312 10/25/20  0303   * 146* 140   K 4.5 4.3 3.8   * 110* 107   CO2 29 27 23   BUN 63* 63* 60*   CREATININE 1.80* 1.48* 1.58*     PT/INR:   Recent Labs     10/24/20  1328   PROTIME 13.2*   INR 1.3     APTT:   Recent Labs     10/24/20  1932 10/25/20  0303   APTT 30.1 41.6*     FASTING LIPID PANEL:  Lab Results   Component Value Date    CHOL 166 05/11/2017    HDL 43 05/11/2017    TRIG 132 05/11/2017     LIVER PROFILE:   Recent Labs     10/24/20  1328   AST 14   ALT 22   BILITOT 0.80   ALKPHOS 109      Imaging:    Xr Chest Portable    Result Date: 10/24/2020  Cardiomegaly with hydrostatic edema and small layering pleural effusions in keeping with acute congestive heart failure.        ASSESSMENT & PLAN ASSESSMENT / PLAN:     Principal Problem:    Acute on chronic respiratory failure with hypoxia and hypercapnia ([facility] [address]  Plan: Active Problems:    Shortness of breath  Plan:   1. Acute on chronic diastolic heart failure: On cardiac diet fluid restriction 1500, strict input output record, daily weight, nitro drip and Bumex 2 mg 2 times daily  2. Acute hypoxic and hypercapnia respiratory failure: Due to #1. Resolving. 3. Acute metabolic encephalopathy: worsening-  due to retention of CO2, possibly urinary tract infection. Follow-up with urine culture. 4. Essential hypertension: Controlled. Start amlodipine 10 mg and Lopressor 25 mg twice daily. Nitro drip weaned off.  5. Elevated troponin: possibly due to underlying demand ischemia, or CYNTHIA or infection. Cardiology consulted. 6. Urinary tract infection: on ceftriaxone. Follow up with culture  7. Diabetes mellitus Type II: [id number]-3.6 (2/14/20) on high-dose insulin corrective algorithm and Lantus 20 units nightly. On hypoglycemic protocol  8. Acute kidney injury on top of CKD stage III- Continue to monitor BMP. Nephrology consulted. 9. Hypernatremia- resolved  10. Hyperlipidemia-stable, started on Lipitor 40 mg daily  11. A. Fib: On rate control with Lopressor. Hold Eliquis  12. COPD: Stable will resume home meds on discharge  13. S/p renal transplant: On immunosuppressive agent. Tacrolimus and mycophenolate sodium. Nephrology consulted. 14. Obstructive sleep apnea- use non invasive ventilation at night  15. COVID-19 ruled out  16. Anemia of chronic disease: Due to CRF-will monitor hemoglobin  17. DVT prophylaxis on heparin  18. Discharge Planning: [facility]. Transfer process initiated        Nilsa Colbert MD  Internal Medicine Resident, PGY-1  [facility];  [place], [place]  10/25/2020, 6:53 AM

## 2020-10-25 NOTE — H&P
[address]     Department of Internal Medicine - Staff Internal Medicine Teaching Service          ADMISSION NOTE/HISTORY AND PHYSICAL EXAMINATION   Date: 10/24/2020  Patient Name: Rosana Fernando  Date of admission: 10/24/2020  4:14 PM  YOB: 1945  PCP: Balta Rivero MD  History Obtained From:  patient, spouse    CHIEF COMPLAINT     Chief complaint: Shortness of breath    HISTORY OF PRESENTING ILLNESS     70-year-old -American male with past medical history of renal failure s/p renal transplant (2012), congestive heart failure with preserved ejection fraction with greater than 70%, COPD on 2 L home oxygen, pulmonary hypertension with calculated RVSP 64 mmHg, diabetes mellitus type 2, hypertension, chronic immune suppression on Myfortic and tacrolimus, bilateral below-knee amputation, A. fib on Eliquis, presented to the emergency department with shortness of breath. He was transferred from other hospital due to nonavailability of beds. Patient is a poor historian, confused. Was recently discharge from [facility] after respiratory failure due to CHF and pneumonia where he was intubated. Reports of increasing shortness of breath, chest pain, swelling of the body. Denies fever, chills, abdominal pain, common cold. Patient wife states that her  reports back pain due to sacral decubitus    In emergency department, found to be afebrile,   BP: 148/69, pulse 67, RR 18  On 3 L of oxygen via NC SPO2 of 94%  Venous blood gaseS were drawn which shows pH of 7.2, PCO2 of 65.9  Troponin of 230>221.   proBNP 6, 604  Urinary bag show pink-colored urine with red sediment    Received 1 dose of Lasix, on continuous nitroglycerin drip and heparin drip  Electrolytes show hypernatremia 148, increased chloride 111, creatinine 1.80  Hb of 8.8 with granulocytosis of 92%  chest x-ray showed cardiomegaly with hydrostatic edema small layering pleural effusion suggesting of Take 17 g by mouth daily as needed for Constipation    Historical Provider, MD   metoprolol tartrate (LOPRESSOR) 25 MG tablet Take 25 mg by mouth 2 times daily    Historical Provider, MD   sennosides-docusate sodium (SENOKOT-S) 8.6-50 MG tablet Take 1 tablet by mouth daily    Historical Provider, MD   predniSONE (DELTASONE) 5 MG tablet Take 5 mg by mouth daily    Historical Provider, MD   tamsulosin (FLOMAX) 0.4 MG capsule Take 0.4 mg by mouth daily    Historical Provider, MD   dorzolamide-timolol (COSOPT) 22.3-6.8 MG/ML ophthalmic solution  9/3/18   Historical Provider, MD   acetaminophen (TYLENOL) 500 MG tablet Take 500 mg by mouth every 6 hours as needed for Pain    Historical Provider, MD   amLODIPine (NORVASC) 5 MG tablet Take 5 mg by mouth daily    Historical Provider, MD   aspirin 81 MG tablet Take 81 mg by mouth daily    Historical Provider, MD   atorvastatin (LIPITOR) 20 MG tablet Take 40 mg by mouth daily     Historical Provider, MD   insulin glargine (BASAGLAR KWIKPEN) 100 UNIT/ML injection pen Inject into the skin nightly    Historical Provider, MD   bisacodyl (DULCOLAX) 10 MG suppository Place 10 mg rectally daily    Historical Provider, MD   bumetanide (BUMEX) 1 MG tablet Take 2 mg by mouth 2 times daily    Historical Provider, MD   colchicine (COLCRYS) 0.6 MG tablet Take 0.6 mg by mouth daily    Historical Provider, MD   gabapentin (NEURONTIN) 300 MG capsule Take 300 mg by mouth 3 times daily. Macarena Money Historical Provider, MD   insulin lispro (HUMALOG) 100 UNIT/ML injection vial Inject 15 Units into the skin 3 times daily (before meals)     Historical Provider, MD   insulin lispro (HUMALOG) 100 UNIT/ML injection vial Inject into the skin 3 times daily (before meals)    Historical Provider, MD       SOCIAL HISTORY     Tobacco: none  Alcohol: none  Illicits: none      FAMILY HISTORY     History reviewed. No pertinent family history.     PHYSICAL EXAM     Vitals: BP (!) 148/69   Pulse 67   Temp 98.6 °F (37 °C) (Oral)   Resp 18   Ht 6' 1\" (1.854 m)   Wt 224 lb (101.6 kg)   SpO2 96%   BMI 29.55 kg/m²   Tmax: Temp (24hrs), Av.4 °F (36.9 °C), Min:98.1 °F (36.7 °C), Max:98.6 °F (37 °C)    Last Body weight:   Wt Readings from Last 3 Encounters:   10/24/20 224 lb (101.6 kg)   10/24/20 224 lb (101.6 kg)   18 218 lb (98.9 kg)     Body Mass Index : Body mass index is 29.55 kg/m². PHYSICAL EXAMINATION:  Constitutional: This is a well developed, well nourished, 25-29.9 - Overweight 66y.o. year old male who is alert, oriented, cooperative and in no apparent distress. Head:normocephalic and atraumatic. EENT:  PERRLA. No conjunctival injections. Septum was midline, mucosa was without erythema, exudates or cobblestoning. No thrush was noted. Neck: Supple without thyromegaly. No elevated JVP. Trachea was midline. Respiratory: Chest was symmetrical without dullness to percussion. Breath sounds bilaterally were clear to auscultation. There were   . Cardiovascular: Regular without murmur, clicks, gallops or rubs. Crackles noted. Abdomen: Slightly rounded and soft without organomegaly. No rebound, rigidity or guarding was appreciated. Lymphatic: No lymphadenopathy. Musculoskeletal: Normal curvature of the spine. No gross muscle weakness. Extremities:  Upper extremity edema. No ulcerations, tenderness, varicosities or erythema. Muscle size, tone and strength are normal.  No involuntary movements are noted. Skin:  Warm and dry. Good color, turgor and pigmentation. No lesions or scars.   No cyanosis or clubbing  Neurological/Psychiatric: The patient's general behavior, level of consciousness, thought content and emotional status is not normal.          INVESTIGATIONS     Laboratory Testing:     Recent Results (from the past 24 hour(s))   EKG 12 Lead    Collection Time: 10/24/20  1:15 PM   Result Value Ref Range    Ventricular Rate 72 BPM    Atrial Rate 216 BPM    QRS Duration 106 ms    Q-T Interval 394 ms    QTc Calculation (Bazett) 431 ms    P Axis -14 degrees    R Axis -16 degrees    T Axis 96 degrees   CBC Auto Differential    Collection Time: 10/24/20  1:28 PM   Result Value Ref Range    WBC 6.6 3.5 - 11.0 k/uL    RBC 3.04 (L) 4.5 - 5.9 m/uL    Hemoglobin 8.8 (L) 13.5 - 17.5 g/dL    Hematocrit 29.4 (L) 41 - 53 %    MCV 97.0 80 - 100 fL    MCH 28.9 26 - 34 pg    MCHC 29.8 (L) 31 - 37 g/dL    RDW 20.2 (H) 12.5 - 15.4 %    Platelets 669 447 - 072 k/uL    MPV 9.8 6.0 - 12.0 fL    NRBC Automated NOT REPORTED per 100 WBC    Differential Type NOT REPORTED     Immature Granulocytes NOT REPORTED 0 %    Absolute Immature Granulocyte NOT REPORTED 0.00 - 0.30 k/uL    WBC Morphology NOT REPORTED     RBC Morphology NOT REPORTED     Platelet Estimate NOT REPORTED     Seg Neutrophils 92 (H) 36 - 66 %    Lymphocytes 5 (L) 24 - 44 %    Monocytes 3 1 - 7 %    Eosinophils % 0 (L) 1 - 4 %    Basophils 0 0 - 2 %    Segs Absolute 6.07 1.8 - 7.7 k/uL    Absolute Lymph # 0.33 (L) 1.0 - 4.8 k/uL    Absolute Mono # 0.20 0.1 - 0.8 k/uL    Absolute Eos # 0.00 0.0 - 0.4 k/uL    Basophils Absolute 0.00 0.0 - 0.2 k/uL    Morphology ANISOCYTOSIS PRESENT     Morphology HYPOCHROMIA PRESENT    Comprehensive Metabolic Panel    Collection Time: 10/24/20  1:28 PM   Result Value Ref Range    Glucose 118 (H) 70 - 99 mg/dL    BUN 63 (H) 8 - 23 mg/dL    CREATININE 1.80 (H) 0.70 - 1.20 mg/dL    Bun/Cre Ratio NOT REPORTED 9 - 20    Calcium 9.8 8.6 - 10.4 mg/dL    Sodium 148 (H) 135 - 144 mmol/L    Potassium 4.5 3.7 - 5.3 mmol/L    Chloride 111 (H) 98 - 107 mmol/L    CO2 29 20 - 31 mmol/L    Anion Gap 8 (L) 9 - 17 mmol/L    Alkaline Phosphatase 109 40 - 129 U/L    ALT 22 5 - 41 U/L    AST 14 <40 U/L    Total Bilirubin 0.80 0.3 - 1.2 mg/dL    Total Protein 7.2 6.4 - 8.3 g/dL    Alb 3.4 (L) 3.5 - 5.2 g/dL    Albumin/Globulin Ratio 0.9 (L) 1.0 - 2.5    GFR Non- 37 (L) >60 mL/min    GFR  45 (L) >60 mL/min    GFR Comment          GFR Staging NOT REPORTED    Protime-INR    Collection Time: 10/24/20  1:28 PM   Result Value Ref Range    Protime 13.2 (H) 9.4 - 12.6 sec    INR 1.3    Lactic Acid    Collection Time: 10/24/20  1:28 PM   Result Value Ref Range    Lactic Acid 0.6 0.5 - 2.2 mmol/L   Troponin    Collection Time: 10/24/20  1:28 PM   Result Value Ref Range    Troponin, High Sensitivity 230 (HH) 0 - 22 ng/L    Troponin T NOT REPORTED <0.03 ng/mL    Troponin Interp NOT REPORTED    Brain Natriuretic Peptide    Collection Time: 10/24/20  1:28 PM   Result Value Ref Range    Pro-BNP 6,604 (H) <300 pg/mL    BNP Interpretation Pro-BNP Reference Range:    COVID-19    Collection Time: 10/24/20  1:50 PM    Specimen: Other   Result Value Ref Range    SARS-CoV-2          SARS-CoV-2, Rapid Not Detected Not Detected    Source . NASOPHARYNGEAL SWAB     SARS-CoV-2         Blood Gas, Venous    Collection Time: 10/24/20  5:33 PM   Result Value Ref Range    pH, [place] 7.265 (L) 7.320 - 7.420    pCO2, [place] 65.9 (H) 39 - 55    pO2, [place] 43.2 30 - 50    HCO3, Venous 28.9 24 - 30 mmol/L    Positive Base Excess, [place] 1.7 0.0 - 2.0 mmol/L    Negative Base Excess, [place] NOT REPORTED 0.0 - 2.0 mmol/L    O2 Sat, [place] 69.7 60.0 - 85.0 %    Total Hb NOT REPORTED 12.0 - 16.0 g/dl    Oxyhemoglobin NOT REPORTED 95.0 - 98.0 %    Carboxyhemoglobin 1.5 0 - 5 %    Methemoglobin NOT REPORTED 0.0 - 1.5 %    Pt Temp 37.0     pH, [place], Temp Adj NOT REPORTED 7.320 - 7.420    pCO2, [place], Temp Adj NOT REPORTED 39 - 55 mmHg    pO2, [place], Temp Adj NOT REPORTED 30 - 50 mmHg    O2 Device/Flow/% NOT REPORTED     Respiratory Rate NOT REPORTED     Eusebio Test NOT REPORTED     Sample Site NOT REPORTED     Pt.  Position NOT REPORTED     Mode NOT REPORTED     Set Rate NOT REPORTED     Total Rate NOT REPORTED     VT NOT REPORTED     FIO2 INFORMATION NOT PROVIDED     Peep/Cpap NOT REPORTED     PSV NOT REPORTED     Text for Respiratory NOT REPORTED     NOTIFICATION NOT REPORTED NOTIFICATION TIME NOT REPORTED    Troponin    Collection Time: 10/24/20  5:33 PM   Result Value Ref Range    Troponin, High Sensitivity 221 (HH) 0 - 22 ng/L    Troponin T NOT REPORTED <0.03 ng/mL    Troponin Interp NOT REPORTED    APTT    Collection Time: 10/24/20  7:32 PM   Result Value Ref Range    PTT 30.1 20.5 - 30.5 sec       Imaging:   Xr Chest Portable    Result Date: 10/24/2020  Cardiomegaly with hydrostatic edema and small layering pleural effusions in keeping with acute congestive heart failure. ASSESSMENT & PLAN     ASSESSMENT / PLAN:     IMPRESSION  This is a 66 y.o. male who presented with history of breath and found to have acute exacerbation of diastolic heart failure. Patient admitted to inpatient status    Principal Problem:    Acute on chronic respiratory failure with hypoxia and hypercapnia (HCC)    Plan:   1. Acute on chronic diastolic heart failure: On cardiac diet fluid restriction 1500, strict input output record, daily weight, nitro drip and Bumex 2 mg 2 times daily  2. Acute hypoxic and hypercapnia respiratory failure: Due to #1.  3. Alter mental status: due to retention of CO2, possibly urinary tract infection. Follow-up with urinalysis. 4. Essential hypertension: Controlled. Start amlodipine 10 mg and Lopressor 25 mg twice daily and nitro drip. 5. Diabetes mellitus Type II: PcD5U-8.6 (2/14/20) on high-dose insulin corrective algorithm and Lantus 20 units nightly  6. Acute kidney injury on top of CKD stage III- Continue to monitor BMP  7. Hypernatremia- Encourage free water intake. 8. Hyperlipidemia-stable, started on Lipitor 40 mg daily  9. A. Fib: On rate control with Lopressor. Hold Eliquis  10. COPD: Stable will resume home meds on discharge  11. S/p renal transplant: On immunosuppressive agent. Tacrolimus and mycophenolate sodium. Held home meds  12. Obstructive sleep apnea  13. COVID-19 ruled out  14.  Anemia of chronic disease: Due to CRF-we will monitor hemoglobin  15. DVT prophylaxis on heparin        Fani Hart MD  Internal Medicine Resident, PGY-1  [facility];  [place], [place]  10/24/2020, 9:25 PM

## 2020-10-25 NOTE — CONSULTS
[facility] Cardiology Cardiology    Inpatient Consultation Note               Today's Date: 10/25/2020  Patient Name: Courtney Amanda  Date of admission: 10/24/2020  4:14 PM  Patient's age: 66 y.o., 1945  Admission Dx: Shortness of breath [R06.02]  Shortness of breath [R06.02]    Reason for  Consult:  Troponin elevation in setting of acute on chronic heart failur    Requesting Physician: Roby Head MD    CHIEF COMPLAINT:     Chief Complaint   Patient presents with    Shortness of Breath       History Obtained From:  patient, electronic medical record    HISTORY OF PRESENT ILLNESS:      The patient is a 66 y.o. male who is admitted to the hospital for acute on chronic HF. PMH of diastolic HF, COPD on 2 L home O2, T2Dm, HTN, HLD, PVD s/p MICHEAL BKA, A fib on eliquis, renal failure s/p transplant. Patient comes in due to shortness of breath. Was recently admitted to [facility] for CHF exacerbation likely due to PNA. Was subsequently discharged to SNF and per EMR was doing well until he started feeling short of breath yesterday. In ED noted to have stable Vitals. Troponin noted to be elevated to 233, pro NT BNP 6600. Patient given 1 dose of lasix, nitroglycerin gtt and heparin gtt and admitted for further management. Cardiology consulted for troponin elevation in setting of CHF exacerbation. Past Medical History:   has a past medical history of Blind one eye, Chronic kidney disease, Diabetes mellitus ([address] and Hypertension. Past Surgical History:   has a past surgical history that includes Leg amputation below knee (Right, 08/2018); hernia repair; tumor excision; and Kidney transplant (N/A). Home Medications:    Prior to Admission medications    Medication Sig Start Date End Date Taking?  Authorizing Provider   aspirin (ASPIRIN 81) 81 MG EC tablet Take by mouth    Historical Provider, MD   furosemide (LASIX) 40 MG tablet Take 40 mg by mouth 2 times daily    Historical Provider, MD   ertapenem (INVANZ) 1 GM injection Inject 1,000 mg into the muscle every 24 hours    Historical Provider, MD   apixaban (ELIQUIS) 2.5 MG TABS tablet Take by mouth 2 times daily    Historical Provider, MD   dorzolamide (TRUSOPT) 2 % ophthalmic solution 2 drops 3 times daily    Historical Provider, MD   albuterol (PROVENTIL) (2.5 MG/3ML) 0.083% nebulizer solution Take 2.5 mg by nebulization every 6 hours as needed for Wheezing    Historical Provider, MD   polyethylene glycol (GLYCOLAX) 17 g packet Take 17 g by mouth daily as needed for Constipation    Historical Provider, MD   metoprolol tartrate (LOPRESSOR) 25 MG tablet Take 25 mg by mouth 2 times daily    Historical Provider, MD   sennosides-docusate sodium (SENOKOT-S) 8.6-50 MG tablet Take 1 tablet by mouth daily    Historical Provider, MD   predniSONE (DELTASONE) 5 MG tablet Take 5 mg by mouth daily    Historical Provider, MD   tamsulosin (FLOMAX) 0.4 MG capsule Take 0.4 mg by mouth daily    Historical Provider, MD   dorzolamide-timolol (COSOPT) 22.3-6.8 MG/ML ophthalmic solution  9/3/18   Historical Provider, MD   acetaminophen (TYLENOL) 500 MG tablet Take 500 mg by mouth every 6 hours as needed for Pain    Historical Provider, MD   amLODIPine (NORVASC) 5 MG tablet Take 5 mg by mouth daily    Historical Provider, MD   aspirin 81 MG tablet Take 81 mg by mouth daily    Historical Provider, MD   atorvastatin (LIPITOR) 20 MG tablet Take 40 mg by mouth daily     Historical Provider, MD   insulin glargine (BASAGLAR KWIKPEN) 100 UNIT/ML injection pen Inject into the skin nightly    Historical Provider, MD   bisacodyl (DULCOLAX) 10 MG suppository Place 10 mg rectally daily    Historical Provider, MD   bumetanide (BUMEX) 1 MG tablet Take 2 mg by mouth 2 times daily    Historical Provider, MD   colchicine (COLCRYS) 0.6 MG tablet Take 0.6 mg by mouth daily    Historical Provider, MD   gabapentin (NEURONTIN) 300 MG capsule Take 300 mg by mouth 3 times daily. Delaney Alexander     Historical Provider, MD insulin lispro (HUMALOG) 100 UNIT/ML injection vial Inject 15 Units into the skin 3 times daily (before meals)     Historical Provider, MD   insulin lispro (HUMALOG) 100 UNIT/ML injection vial Inject into the skin 3 times daily (before meals)    Historical Provider, MD        Current Facility-Administered Medications: insulin lispro (HUMALOG) injection vial 0-6 Units, 0-6 Units, Subcutaneous, TID WC  insulin lispro (HUMALOG) injection vial 0-3 Units, 0-3 Units, Subcutaneous, Nightly  insulin glargine (LANTUS) injection vial 10 Units, 10 Units, Subcutaneous, Nightly  cefTRIAXone (ROCEPHIN) 1 g IVPB in 50 mL D5W minibag, 1 g, Intravenous, Q24H  bumetanide (BUMEX) injection 2 mg, 2 mg, Intravenous, BID  atorvastatin (LIPITOR) tablet 40 mg, 40 mg, Oral, Nightly  tamsulosin (FLOMAX) capsule 0.4 mg, 0.4 mg, Oral, Daily  sennosides-docusate sodium (SENOKOT-S) 8.6-50 MG tablet 1 tablet, 1 tablet, Oral, Daily  sodium chloride flush 0.9 % injection 10 mL, 10 mL, Intravenous, 2 times per day  sodium chloride flush 0.9 % injection 10 mL, 10 mL, Intravenous, PRN  acetaminophen (TYLENOL) tablet 650 mg, 650 mg, Oral, Q6H PRN **OR** acetaminophen (TYLENOL) suppository 650 mg, 650 mg, Rectal, Q6H PRN  polyethylene glycol (GLYCOLAX) packet 17 g, 17 g, Oral, Daily PRN  promethazine (PHENERGAN) tablet 12.5 mg, 12.5 mg, Oral, Q6H PRN **OR** ondansetron (ZOFRAN) injection 4 mg, 4 mg, Intravenous, Q6H PRN  heparin (porcine) injection 4,000 Units, 4,000 Units, Intravenous, PRN  heparin (porcine) injection 2,000 Units, 2,000 Units, Intravenous, PRN  heparin 25,000 units in dextrose 5% 250 mL infusion, 9.9 Units/kg/hr, Intravenous, Continuous  amLODIPine (NORVASC) tablet 10 mg, 10 mg, Oral, Nightly  metoprolol tartrate (LOPRESSOR) tablet 25 mg, 25 mg, Oral, BID  glucose (GLUTOSE) 40 % oral gel 15 g, 15 g, Oral, PRN  dextrose 50 % IV solution, 12.5 g, Intravenous, PRN  glucagon (rDNA) injection 1 mg, 1 mg, Intramuscular, PRN  dextrose 5 % solution, 100 mL/hr, Intravenous, PRN    Allergies:  Contrast  [iodides]    Social History:   reports that he has never smoked. He has never used smokeless tobacco. He reports that he does not drink alcohol or use drugs. Family History: family history is not on file. REVIEW OF SYSTEMS:      · Constitutional: there has been no unanticipated weight loss. · Eyes: No visual changes or diplopia. · ENT: No Headaches  · Cardiovascular:  Remaining as above  · Respiratory: No cough  · Gastrointestinal: No abdominal pain. No change in bowel or bladder habits. · Genitourinary: No dysuria, trouble voiding, or hematuria. · Musculoskeletal: No joint complaints. · Neurological: No headache  · Hematologic/Lymphatic: No abnormal bruising or bleeding      PHYSICAL EXAM:      BP (!) 112/53   Pulse 68   Temp 98.2 °F (36.8 °C) (Axillary)   Resp 20   Ht 6' 1\" (1.854 m)   Wt 231 lb 7.7 oz (105 kg)   SpO2 97%   BMI 30.54 kg/m²      Intake/Output Summary (Last 24 hours) at 10/25/2020 0955  Last data filed at 10/25/2020 4609  Gross per 24 hour   Intake 632.42 ml   Output 2175 ml   Net -1542.58 ml         Constitutional and General Appearance:    Alert, cooperative, no distress and appears stated age  Respiratory:  · No for increased work of breathing. · On auscultation: diminished breath sound, crackles heard bilaterally  Cardiovascular:  · Irregular  · No murmurs  Abdomen:   · No masses or tenderness  · Bowel sounds present  Extremities:  ·  No Cyanosis or Clubbing  ·  Lower extremity edema: Trace to +1  Neurological:  · Alert and oriented.   · Moves all extremities well    DATA:    Diagnostics:    EKG:   Atrial flutter 3:1 conduction    Labs:     CBC:   Recent Labs     10/24/20  1328 10/25/20  0303   WBC 6.6 6.0   HGB 8.8* 8.4*   HCT 29.4* 31.0*    See Reflexed IPF Result     BMP:   Recent Labs     10/24/20  2312 10/25/20  0303   * 140   K 4.3 3.8   CO2 27 23   BUN 63* 60*   CREATININE 1.48* 1.58* LABGLOM 46* 43*   GLUCOSE 71 84     Pro-BNP:    Recent Labs     10/24/20  1328   PROBNP 6,604*     BNP: No results for input(s): BNP in the last 72 hours. PT/INR:   Recent Labs     10/24/20  1328   PROTIME 13.2*   INR 1.3     APTT:  Recent Labs     10/25/20  0303 10/25/20  0843   APTT 41.6* 58.8*     CARDIAC ENZYMES:No results for input(s): CKTOTAL, CKMB, CKMBINDEX, TROPONINI in the last 72 hours. Invalid input(s):  [address]  Recent Labs     10/24/20  1328 10/24/20  1733 10/25/20  0843   TROPONINT NOT REPORTED NOT REPORTED NOT REPORTED       FASTING LIPID PANEL:  Lab Results   Component Value Date    HDL 43 05/11/2017    TRIG 132 05/11/2017     LIVER PROFILE:  Recent Labs     10/24/20  1328   AST 14   ALT 22   LABALBU 3.4*         Patient's Active Problem List  Principal Problem:    Acute on chronic respiratory failure with hypoxia and hypercapnia (HCC)  Active Problems:    Shortness of breath    Acute respiratory acidosis    Acute on chronic diastolic (congestive) heart failure (HCC)    Hypernatremia    CYNTHIA (acute kidney injury) ([facility]    Normocytic anemia    Atrial flutter with controlled response (HCC)    Anemia of chronic renal failure    Kidney transplant recipient    Hyperlipidemia    Nonproliferative diabetic retinopathy ([facility]    Obstructive sleep apnea syndrome    Stage 3 chronic kidney disease    Uncontrolled type 2 diabetes mellitus (HCC)    S/P bilateral BKA (below knee amputation) ([facility]    Acute cystitis with hematuria    Sacral decubitus ulcer  Resolved Problems:    * No resolved hospital problems. *        IMPRESSION:    1. Acute on chronic diastolic heart failure  2. Troponin elevation: NSTEMI vs demand ischemia  3. Atrial Fibrillation/Atrial Flutter on eliquis at home  4. T2Dm  5. HTN  6. PVD s/p MICHEAL BKA  7. Renal Failure s/p transplant    RECOMMENDATIONS:  1. Continue bumex 2 mg IV BID  2. Continue heparin gtt  3. Nitroglycerin has been discontinued  4.  Continue amlodipine 10 mg

## 2020-10-25 NOTE — FLOWSHEET NOTE
Assessment: Mo Interiano is a 66 y.o. male per report, with shortness of breath. Pt was comfortable and resting at time of visit. Intervention:  was rounding of floor.  offered words of courage and hope.  was bedside support. Outcome:  was ministry of presence to pt during hospital stay.       10/25/20 8991   Encounter Summary   Services provided to: Patient   Referral/Consult From: [facility] Family members   Continue Visiting   (10/24/20)   Complexity of Encounter Low   Length of Encounter 15 minutes   Spiritual Assessment Completed Yes   Spiritual/Druze   Type Spiritual support   Assessment Approachable   Intervention Explored feelings, thoughts, concerns   Outcome Comfort

## 2020-10-26 PROBLEM — E87.6 HYPOKALEMIA: Status: ACTIVE | Noted: 2020-01-01

## 2020-10-26 NOTE — PROGRESS NOTES
[facility]  Internal Medicine Teaching Residency Program  Inpatient Daily Progress Note  ______________________________________________________________________________    Patient: Vaishnavi Rojas  YOB: 1945   NEP:[id number]    Acct: [de-identified]     Room: [facility]/0289-67  Admit date: 10/24/2020  Today's date: 10/26/20  Number of days in the hospital: 2    SUBJECTIVE   Admitting Diagnosis: Acute on chronic respiratory failure with hypoxia and hypercapnia (HCC)  CC: shortness of breath and confusion  Pt examined at bedside. Chart & results reviewed. Afebrile  Vitals stable  On 4 lpm of oxygen via nasal cannula and saturating well. Mentation is better. nephro and cardio on board    Patient's family ok with not transferring the patient to [place]. Nephro and cardio informed. Will plan for cath once cleared with nephro.       ROS:  Constitutional:  negative for chills, fevers, sweats  Respiratory:  negative for cough, dyspnea on exertion, hemoptysis, shortness of breath, wheezing  Cardiovascular:  negative for chest pain, chest pressure/discomfort, lower extremity edema, palpitations  Gastrointestinal:  negative for abdominal pain, constipation, diarrhea, nausea, vomiting  Neurological:  negative for dizziness, headache    BRIEF HISTORY     78-year-old -American male with past medical history of renal failure s/p renal transplant (2012), congestive heart failure with preserved ejection fraction with greater than 70%, COPD on 2 L home oxygen, pulmonary hypertension with calculated RVSP 64 mmHg, diabetes mellitus type 2, hypertension, chronic immune suppression on Myfortic and tacrolimus, bilateral below-knee amputation, A. fib on Eliquis, presented to the emergency department with shortness of breath.  He was transferred from other hospital due to nonavailability of beds.      Patient is a poor historian, confused.  Was recently discharge from [facility] after respiratory failure due to CHF and pneumonia where he was intubated.  Reports of increasing shortness of breath, chest pain, swelling of the body.  Denies fever, chills, abdominal pain, common cold. Patient wife states that her  reports back pain due to sacral decubitus    OBJECTIVE     Vital Signs:  BP (!) 143/43   Pulse 67   Temp 98.4 °F (36.9 °C) (Oral)   Resp 18   Ht 6' 1\" (1.854 m)   Wt 227 lb 15.3 oz (103.4 kg)   SpO2 94%   BMI 30.08 kg/m²     Temp (24hrs), Av.5 °F (36.9 °C), Min:98.2 °F (36.8 °C), Max:99 °F (37.2 °C)    In: 1538   Out: 1750 [Urine:1750]    Physical Exam:  Physical Exam  Constitutional:       Appearance: Normal appearance. HENT:      Head: Normocephalic and atraumatic. Nose: Nose normal.      Mouth/Throat:      Mouth: Mucous membranes are moist.   Eyes:      Extraocular Movements: Extraocular movements intact. Pupils: Pupils are equal, round, and reactive to light. Neck:      Musculoskeletal: Normal range of motion. Cardiovascular:      Rate and Rhythm: Normal rate. Pulses: Normal pulses. Heart sounds: Normal heart sounds. Pulmonary:      Effort: Pulmonary effort is normal. No respiratory distress. Breath sounds: No stridor. Rhonchi and rales present. Abdominal:      General: Bowel sounds are normal.      Palpations: Abdomen is soft. Musculoskeletal: Normal range of motion. Comments: B/l BKA   Skin:     General: Skin is warm. Capillary Refill: Capillary refill takes less than 2 seconds. Neurological:      General: No focal deficit present. Mental Status: He is alert. Mental status is at baseline.    Psychiatric:         Mood and Affect: Mood normal.         Behavior: Behavior normal.           Medications:  Scheduled Medications:    potassium chloride  40 mEq Oral Once    insulin lispro  0-6 Units Subcutaneous TID WC    insulin lispro  0-3 Units Subcutaneous Nightly    insulin glargine  10 Units Subcutaneous Nightly    cefTRIAXone (ROCEPHIN) IV  1 g Intravenous Q24H    [Held by provider] bumetanide  2 mg Intravenous BID    atorvastatin  40 mg Oral Nightly    tamsulosin  0.4 mg Oral Daily    sennosides-docusate sodium  1 tablet Oral Daily    sodium chloride flush  10 mL Intravenous 2 times per day    amLODIPine  10 mg Oral Nightly    metoprolol tartrate  25 mg Oral BID     Continuous Infusions:    heparin (PORCINE) Infusion 10.9 Units/kg/hr (10/26/20 0510)    dextrose       PRN Medicationssodium chloride flush, 10 mL, PRN  acetaminophen, 650 mg, Q6H PRN    Or  acetaminophen, 650 mg, Q6H PRN  polyethylene glycol, 17 g, Daily PRN  promethazine, 12.5 mg, Q6H PRN    Or  ondansetron, 4 mg, Q6H PRN  heparin (porcine), 4,000 Units, PRN  heparin (porcine), 2,000 Units, PRN  glucose, 15 g, PRN  dextrose, 12.5 g, PRN  glucagon (rDNA), 1 mg, PRN  dextrose, 100 mL/hr, PRN        Diagnostic Labs:  CBC:   Recent Labs     10/24/20  1328 10/25/20  0303 10/26/20  0311   WBC 6.6 6.0 4.7   RBC 3.04* 2.89* 2.88*   HGB 8.8* 8.4* 8.3*   HCT 29.4* 31.0* 29.2*   MCV 97.0 107.3* 101.4   RDW 20.2* 18.7* 18.5*    See Reflexed IPF Result 130*     BMP:   Recent Labs     10/25/20  0303 10/25/20  2240 10/26/20  0311    139 143   K 3.8 4.0 3.5*    105 107   CO2 23 27 28   BUN 60* 57* 55*   CREATININE 1.58* 1.50* 1.44*     BNP: No results for input(s): BNP in the last 72 hours. PT/INR:   Recent Labs     10/24/20  1328   PROTIME 13.2*   INR 1.3     APTT:   Recent Labs     10/25/20  1544 10/25/20  2240 10/26/20  0311   APTT 49.5* 40.7* 94.8*     CARDIAC ENZYMES: No results for input(s): CKMB, CKMBINDEX, TROPONINI in the last 72 hours.     Invalid input(s): CKTOTAL;3  FASTING LIPID PANEL:  Lab Results   Component Value Date    CHOL 166 05/11/2017    HDL 43 05/11/2017    TRIG 132 05/11/2017     LIVER PROFILE:   Recent Labs     10/24/20  1328   AST 14   ALT 22   BILITOT 0.80   ALKPHOS 109      MICROBIOLOGY:   Lab Results Component Value Date/Time    CULTURE NO GROWTH 1 DAY 10/24/2020 02:47 PM       Imaging:    Xr Chest Portable    Result Date: 10/24/2020  Cardiomegaly with hydrostatic edema and small layering pleural effusions in keeping with acute congestive heart failure. ASSESSMENT & PLAN     Assessment and Plan:    Principal Problem:    Acute on chronic respiratory failure with hypoxia and hypercapnia ([facility])  Active Problems:    Shortness of breath    Acute respiratory acidosis    Acute on chronic diastolic (congestive) heart failure ([facility])    Hypernatremia    CYNTHIA (acute kidney injury) ([facility])    Normocytic anemia    Atrial flutter with controlled response ([facility])    Anemia of chronic renal failure    Kidney transplant recipient    Hyperlipidemia    Nonproliferative diabetic retinopathy ([facility] [address]    Obstructive sleep apnea syndrome    Stage 3 chronic kidney disease    Uncontrolled type 2 diabetes mellitus ([facility])    S/P bilateral BKA (below knee amputation) ([facility] [address]    Acute cystitis with hematuria    Sacral decubitus ulcer    Hypokalemia  Resolved Problems:    * No resolved hospital problems. *    1. Acute on chronic diastolic heart failure: On cardiac diet fluid restriction 1500, strict input output record, daily weight, nitro drip and Bumex 2 mg 2 times daily. Follow up with nephro. 2. Acute hypoxic and hypercapnia respiratory failure: Due to #1. improving. Continue diuresis, wean off oxygen. 3. Acute metabolic encephalopathy: multifactorial due to uti and co2 narcosis. improving. 4. Essential hypertension: Controlled. continue amlodipine 10 mg and Lopressor 25 mg twice daily. 5. Elevated troponin: possibly due to underlying demand ischemia, or CYNTHIA or infection. Cardiology on board, cath once ok with nephro. 6. Urinary tract infection: continue ceftriaxone. 7. Diabetes mellitus Type II: MmW2Y-1.6 (2/14/20) on high-dose insulin corrective algorithm and lantus 20 units nightly. On hypoglycemic protocol.   8. Acute kidney injury on top of CKD stage III- Continue to monitor BMP. Nephrology on board. 9. Hypernatremia- resolved  10. Hypokalemia. 3.5, replace potassium. 11. Hyperlipidemia-stable, started on Lipitor 40 mg daily  12. A. Fib: On rate control with Lopressor.  Hold Eliquis  13. COPD: Stable will resume home meds on discharge  14. S/p renal transplant: On immunosuppressive agent.  Tacrolimus and mycophenolate sodium. Nephrology on board. 15. Obstructive sleep apnea- Bipap. 16. Anemia of chronic disease: Due to CRF-will monitor hemoglobin      Diet: cardiac diet  DVT ppx : heparin  GI ppx: not indicated    PT/OT: ongoing  Discharge Planning / Yetta Foster: ongoing.       Kyle Ernst MD  Internal Medicine Resident  [facility]  10/26/2020 8:52 AM

## 2020-10-26 NOTE — PROGRESS NOTES
level, Able to Live on Main level with bedroom/bathroom  Home Access: Stairs to enter without rails  Entrance Stairs - Number of Steps: 1 step  Bathroom Shower/Tub: Tub/Shower unit  Bathroom Toilet: Handicap height  Bathroom Equipment: Shower chair  Home Equipment: 4 wheeled walker, Rolling walker, [employer]  Receives Help From: Family  ADL Assistance: Independent  Ambulation Assistance: Needs assistance  Transfer Assistance: Needs assistance  Active : No  Patient's  Info: wife drives  Cognition      Objective     AROM RLE (degrees)  RLE AROM: WFL  RLE General AROM: R BKA  AROM LLE (degrees)  LLE AROM : WFL  LLE General AROM: L BKA  AROM RUE (degrees)  RUE AROM : WNL  AROM LUE (degrees)  LUE AROM : WNL  Strength RLE  Strength RLE: WFL  Strength LLE  Strength LLE: WFL  Strength RUE  Strength RUE: WFL  Strength LUE  Strength LUE: WFL     Sensation  Overall Sensation Status: Impaired(Reports numnbess in B hands)  Bed mobility  Supine to Sit: Moderate assistance  Sit to Supine: Moderate assistance  Scooting: Moderate assistance     Ambulation  Ambulation?: Yes  Ambulation 1  Surface: level tile  Assistance:  Moderate assistance  Distance: Supine to sit with mod assist  The pt sat at the bedside x 10 min with CGA at times  Comments: Pt states that he has 2 prosthesis's for his LE's     Balance  Posture: Good  Sitting - Static: Fair  Sitting - Dynamic: Poor      Plan   Plan  Times per week: 5-6x wk  Current Treatment Recommendations: Strengthening, Balance Training, Functional Mobility Training, Endurance Training, Positioning, Transfer Training, Safety Education & Training  Safety Devices  Type of devices: Left in bed, Call light within reach, Nurse notified    G-Code     OutComes Score     AM-PAC Score  AM-PAC Inpatient Mobility Raw Score : 10 (10/26/20 1041)  AM-PAC Inpatient T-Scale Score : 32.29 (10/26/20 1041)  Mobility Inpatient CMS 0-100% Score: 76.75 (10/26/20 1041)  Mobility Inpatient CMS

## 2020-10-26 NOTE — PROGRESS NOTES
[facility]  Progress Note                   Date:   10/26/2020  Patient name: Ana Paula Vines  Date of admission:  10/24/2020  4:14 PM  MRN:   [id number]  YOB: 1945  PCP: Ryann Quick MD    Reason for Admission: Shortness of breath [R06.02]  Shortness of breath [R06.02]    Subjective:       Clinical Changes /Abnormalities: Pt. Seen & examined in room. Denies any chest pain or SOB. Tele presently Afib 60-70s. On Heparin gtt. Labs and vitals reviewed. Review of Systems    Medications:   Scheduled Meds:   acetylcysteine  600 mg Oral BID    insulin lispro  0-6 Units Subcutaneous TID WC    insulin lispro  0-3 Units Subcutaneous Nightly    insulin glargine  10 Units Subcutaneous Nightly    cefTRIAXone (ROCEPHIN) IV  1 g Intravenous Q24H    bumetanide  2 mg Intravenous BID    atorvastatin  40 mg Oral Nightly    tamsulosin  0.4 mg Oral Daily    sennosides-docusate sodium  1 tablet Oral Daily    sodium chloride flush  10 mL Intravenous 2 times per day    amLODIPine  10 mg Oral Nightly    metoprolol tartrate  25 mg Oral BID     Continuous Infusions:   IV infusion builder      heparin (PORCINE) Infusion 12.894 Units/kg/hr (10/26/20 1224)    dextrose       CBC:   Recent Labs     10/24/20  1328 10/25/20  0303 10/26/20  0311   WBC 6.6 6.0 4.7   HGB 8.8* 8.4* 8.3*    See Reflexed IPF Result 130*     BMP:    Recent Labs     10/25/20  0303 10/25/20  2240 10/26/20  0311    139 143   K 3.8 4.0 3.5*    105 107   CO2 23 27 28   BUN 60* 57* 55*   CREATININE 1.58* 1.50* 1.44*   GLUCOSE 84 150* 113*     Hepatic:  Recent Labs     10/24/20  1328   AST 14   ALT 22   BILITOT 0.80   ALKPHOS 109     Troponin:   Recent Labs     10/24/20  1733 10/25/20  0843 10/25/20  1149   TROPHS 221* 209* 199*     BNP: No results for input(s): BNP in the last 72 hours. Lipids: No results for input(s): CHOL, HDL in the last 72 hours.     Invalid input(s): LDLCALCU  INR:   Recent Labs Hypernatremia     CYNTHIA (acute kidney injury) ([facility] [address]     Normocytic anemia     Atrial flutter with controlled response (HCC)     Anemia of chronic renal failure     Kidney transplant recipient     Hyperlipidemia     Nonproliferative diabetic retinopathy ([facility] [address]     Obstructive sleep apnea syndrome     Stage 3 chronic kidney disease     Uncontrolled type 2 diabetes mellitus (HCC)     S/P bilateral BKA (below knee amputation) ([facility] [address]     Acute cystitis with hematuria     Sacral decubitus ulcer     Hypokalemia      Plan of Treatment:   1. Doing well. Breathing has improved. Appriciate nephrology recommendations. OK for cath per Dr. Inge Salazar. 2. Will continue IV Heparin gtt, PO statin, BB. NPO after midnight for cath in AM if remains stable. 3. Echo reviewed as above.     Electronically signed by ALFREDO Wilson CNP on 10/26/2020 at 12:40 PM  [address]  [phone]

## 2020-10-26 NOTE — PROGRESS NOTES
Renal Progress Note    Patient :  Dee Ramsey; 66 y.o. MRN# [id number]  Location:  0239/7003-99  Attending:  Juli Marie MD  Admit Date:  10/24/2020   Hospital Day: 2      Subjective:     Oral intake good. Urine output good. Santos catheter in place, placed before he came into the hospital at [facility]. Shortness of breath better. Put out close to about 2.6 L of urine. Troponins have gone up slightly. Cardiology input noted, 2D echocardiogram ordered. Plan for cardiac catheterization noted. No cough phlegm or hemoptysis. History reviewed,  donor kidney transplant in  at [address] Primary disease not known. Does have diabetes now with with suboptimal control, hemoglobin A1c in the 8-9 range. Not sure if he had diabetes prior to the transplant as well. Recent 2D echocardiogram reviewed. Ejection fraction preserved. Has been on tacrolimus MMF and prednisone.     Outpatient Medications:     Medications Prior to Admission: Pollen Extracts (PROSTAT PO), Take by mouth  acetaminophen (TYLENOL) 325 MG tablet, Take 650 mg by mouth every 6 hours as needed for Pain  apixaban (ELIQUIS) 5 MG TABS tablet, Take by mouth 2 times daily  atorvastatin (LIPITOR) 40 MG tablet, Take 40 mg by mouth nightly  vitamin D (CHOLECALCIFEROL) 125 MCG (5000 UT) CAPS capsule, Take 5,000 Units by mouth daily  docusate sodium (COLACE) 100 MG capsule, Take 100 mg by mouth 3 times daily  magnesium oxide (MAG-OX) 400 MG tablet, Take 400 mg by mouth daily  mycophenolate (MYFORTIC) 180 MG DR tablet, Take 540 mg by mouth 2 times daily  ondansetron (ZOFRAN) 4 MG tablet, Take 4 mg by mouth every 8 hours as needed for Nausea or Vomiting  POTASSIUM CHLORIDE ER PO, Take 20 mEq by mouth daily  b complex vitamins capsule, Take 1 capsule by mouth daily  tacrolimus (PROGRAF) 1 MG capsule, Take 3 mg by mouth 2 times daily  aspirin (ASPIRIN 81) 81 MG EC tablet, Take by mouth daily   furosemide (LASIX) 40 MG tablet, Take 40 mg by mouth 2 times daily  ertapenem (INVANZ) 1 GM injection, Inject 1,000 mg into the muscle every 24 hours  dorzolamide (TRUSOPT) 2 % ophthalmic solution, Place 1 drop into the right eye 2 times daily   albuterol (PROVENTIL) (2.5 MG/3ML) 0.083% nebulizer solution, Take 2.5 mg by nebulization every 4 hours as needed for Wheezing   polyethylene glycol (GLYCOLAX) 17 g packet, Take 17 g by mouth daily   metoprolol tartrate (LOPRESSOR) 25 MG tablet, Take 25 mg by mouth 2 times daily  sennosides-docusate sodium (SENOKOT-S) 8.6-50 MG tablet, Take 2 tablets by mouth daily   predniSONE (DELTASONE) 5 MG tablet, Take 5 mg by mouth daily  tamsulosin (FLOMAX) 0.4 MG capsule, Take 0.4 mg by mouth nightly   [DISCONTINUED] apixaban (ELIQUIS) 2.5 MG TABS tablet, Take by mouth 2 times daily  amLODIPine (NORVASC) 5 MG tablet, Take 10 mg by mouth daily   insulin glargine (BASAGLAR KWIKPEN) 100 UNIT/ML injection pen, Inject 30 Units into the skin daily   bisacodyl (DULCOLAX) 10 MG suppository, Place 10 mg rectally daily  bumetanide (BUMEX) 1 MG tablet, Take 2 mg by mouth 2 times daily  colchicine (COLCRYS) 0.6 MG tablet, Take 0.6 mg by mouth daily  gabapentin (NEURONTIN) 300 MG capsule, Take 300 mg by mouth 3 times daily. .  insulin lispro (HUMALOG) 100 UNIT/ML injection vial, Inject into the skin 3 times daily (before meals)  [DISCONTINUED] dorzolamide-timolol (COSOPT) 22.3-6.8 MG/ML ophthalmic solution,   [DISCONTINUED] acetaminophen (TYLENOL) 500 MG tablet, Take 500 mg by mouth every 6 hours as needed for Pain  [DISCONTINUED] aspirin 81 MG tablet, Take 81 mg by mouth daily  [DISCONTINUED] atorvastatin (LIPITOR) 20 MG tablet, Take 40 mg by mouth daily   [DISCONTINUED] insulin lispro (HUMALOG) 100 UNIT/ML injection vial, Inject 15 Units into the skin 3 times daily (before meals)     Current Medications:     Scheduled Meds:    insulin lispro  0-6 Units Subcutaneous TID WC    insulin lispro  0-3 Units Subcutaneous Nightly    insulin glargine  10 Units Subcutaneous Nightly    cefTRIAXone (ROCEPHIN) IV  1 g Intravenous Q24H    bumetanide  2 mg Intravenous BID    atorvastatin  40 mg Oral Nightly    tamsulosin  0.4 mg Oral Daily    sennosides-docusate sodium  1 tablet Oral Daily    sodium chloride flush  10 mL Intravenous 2 times per day    amLODIPine  10 mg Oral Nightly    metoprolol tartrate  25 mg Oral BID     Continuous Infusions:    heparin (PORCINE) Infusion 12.894 Units/kg/hr (10/26/20 0944)    dextrose       PRN Meds:  sodium chloride flush, acetaminophen **OR** acetaminophen, polyethylene glycol, promethazine **OR** ondansetron, heparin (porcine), heparin (porcine), glucose, dextrose, glucagon (rDNA), dextrose    Input/Output:       I/O last 3 completed shifts: In: 1529 [P.O.:1300; I.V.:188; IV Piggyback:50]  Out: 2600 [Urine:2600]. Patient Vitals for the past 96 hrs (Last 3 readings):   Weight   10/26/20 0523 227 lb 15.3 oz (103.4 kg)   10/25/20 0404 231 lb 7.7 oz (105 kg)   10/24/20 1614 224 lb (101.6 kg)       Vital Signs:   Temperature:  Temp: 98.8 °F (37.1 °C)  TMax:   Temp (24hrs), Av.5 °F (36.9 °C), Min:98.2 °F (36.8 °C), Max:99 °F (37.2 °C)    Respirations:  Resp: 18  Pulse:   Pulse: 68  BP:    BP: (!) 158/37  BP Range: Systolic (92HEM), TRC:428 , Min:139 , FXW:908       Diastolic (50BJA), LCZ:87, Min:37, Max:80      Physical Examination:     General:  AAO x 3, speaking in full sentences, no accessory muscle use. HEENT: Atraumatic, normocephalic, no throat congestion, moist mucosa. Eyes:   Right pupil fixed, corneal opacity evident, leukocoria evident, left pupil reacting to light and round  Neck:   No JVD, no thyromegaly, no lymphadenopathy. Chest:  Bilateral vesicular breath sounds, no rales or wheezes. Cardiac:  S1 S2 RR, no murmurs, gallops or rubs, JVP not raised. Abdomen: Soft, non-tender, no masses or organomegaly, BS audible. :   No suprapubic or flank tenderness.   Neuro:  AAO x 3, No FND.  SKIN:  No rashes, good skin turgor. Extremities:  No edema, palpable peripheral pulses, no calf tenderness. Labs:       Recent Labs     10/24/20  1328 10/25/20  0303 10/26/20  0311   WBC 6.6 6.0 4.7   RBC 3.04* 2.89* 2.88*   HGB 8.8* 8.4* 8.3*   HCT 29.4* 31.0* 29.2*   MCV 97.0 107.3* 101.4   MCH 28.9 29.1 28.8   MCHC 29.8* 27.1* 28.4   RDW 20.2* 18.7* 18.5*    See Reflexed IPF Result 130*   MPV 9.8 NOT REPORTED 12.2      BMP:   Recent Labs     10/25/20  0303 10/25/20  2240 10/26/20  0311    139 143   K 3.8 4.0 3.5*    105 107   CO2 23 27 28   BUN 60* 57* 55*   CREATININE 1.58* 1.50* 1.44*   GLUCOSE 84 150* 113*   CALCIUM 9.3 9.3 9.3      Phosphorus:   No results for input(s): PHOS in the last 72 hours.   Magnesium:    Recent Labs     10/25/20  2240 10/26/20  0311   MG 2.3 2.3     Albumin:    Recent Labs     10/24/20  1328   LABALBU 3.4*     BNP:    No results found for: BNP  KIRK:    No results found for: KIRK  SPEP:  Lab Results   Component Value Date    PROT 7.2 10/24/2020    PROT 7.2 05/11/2017     UPEP:   No results found for: LABPE  C3:   No results found for: C3  C4:   No results found for: C4  MPO ANCA:   No results found for: MPO  PR3 ANCA:   No results found for: PR3  Anti-GBM:   No results found for: GBMABIGG  Hep BsAg:       No results found for: HEPBSAG  Hep C AB:        No results found for: HEPCAB    Urinalysis/Chemistries:      Lab Results   Component Value Date    NITRU NEGATIVE 10/25/2020    COLORU YELLOW 10/25/2020    PHUR 6.5 10/25/2020    WBCUA TOO NUMEROUS TO COUNT 10/25/2020    RBCUA TOO NUMEROUS TO COUNT 10/25/2020    MUCUS NOT REPORTED 10/25/2020    TRICHOMONAS NOT REPORTED 10/25/2020    YEAST NOT REPORTED 10/25/2020    BACTERIA FEW 10/25/2020    SPECGRAV 1.009 10/25/2020    LEUKOCYTESUR LARGE 10/25/2020    UROBILINOGEN Normal 10/25/2020    BILIRUBINUR NEGATIVE 10/25/2020    GLUCOSEU NEGATIVE 10/25/2020    KETUA NEGATIVE 10/25/2020    AMORPHOUS NOT REPORTED 10/25/2020     Urine Sodium:     Lab Results   Component Value Date    GENNARO 85 10/25/2020     Urine Potassium:  No results found for: KUR  Urine Chloride:  No results found for: CLUR  Urine Osmolarity: No results found for: OSMOU  Urine Protein:   No components found for: TOTALPROTEIN, URINE   Urine Creatinine:     Lab Results   Component Value Date    LABCREA 24.3 10/25/2020     Urine Eosinophils:  No components found for: UEOS    Radiology:     CXR:     Assessment:     1. 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 Status post  donor kidney transplant  at the [address]  3. Type 2 diabetes with nephropathy  4. Acute non-ST elevation MI  5. Decompensated left heart failure doing better    Plan:   1. Resume tacrolimus 3 mg twice a day,  twice a day, prednisone 5 daily  2. Acceptable but higher than average risk of contrast nephropathy, same discussed with the patient  3. Start normal saline 50 mL an hour 6 hours pre and 6 hours post-cath, start Mucomyst 600 twice a day for 4 doses  4. We will follow with you    Nutrition   Please ensure that patient is on a renal diet/TF. Avoid nephrotoxic drugs/contrast exposure. We will continue to follow along with you.

## 2020-10-26 NOTE — PROGRESS NOTES
Akshat Christianson was evaluated today and a DME order was entered for a semi-electric hospital bed with trapeze and specialty mattress because he requires assistance for positioning needs not possible in an ordinary bed, requirement of elevation of head of bed more than 30 degrees for the diagnosis of bilateral BKA, CHF, Decubitus ulcer stage 2. Patient requires frequent and immediate positioning changes for relief of pain and care needs related to medical diagnoses. Patient needs variability of bed height requirements to perform patient transfers and for personal cares. Current body Weight: 227 lb 15.3 oz (103.4 kg). The need for this equipment was discussed with the patient and he understands and is in agreement.       Lydia Gonzalez MD  Internal Medicine Resident  [facility]  10/26/2020 12:48 PM

## 2020-10-26 NOTE — PROGRESS NOTES
[facility]  Occupational Therapy Not Seen Note    DATE: 10/26/2020  Name: Johnny Davis  : 1945  MRN: [id number]    Patient not available for Occupational Therapy due to:    [] Testing:    [] Hemodialysis    [] Blood Transfusion in Progress    [x]Refusal by Patient: Pt sleeping upon arrival, pt declined OT at this time to get more rest    [] Surgery/Procedure:    [] Strict Bedrest    [] Sedation    [] Spine Precautions     [] Pt being transferred to palliative care at this time. Spoke with pt/family and OT services to be defered. [] Pt independent with functional mobility and functional tasks.  Pt with no OT acute care needs at this time, will defer OT eval.    [] Other    Next Scheduled Treatment: check 10/27    [address]

## 2020-10-26 NOTE — PROGRESS NOTES
Home medications updated via writing nurse and the nursing home list. Dr. Beau Díaz notified of updates made.

## 2020-10-26 NOTE — PROGRESS NOTES
[facility] Nurse  Consult Note       NAME:  Courtney Amanda  MEDICAL RECORD NUMBER:  [id number]  AGE: 66 y.o. GENDER: male  : 1945  TODAY'S DATE:  10/26/2020    Subjective:     Reason for WOCN Evaluation and Assessment: coccyx to left buttock ST 3 pressure injury present upon admission      Courtney Amanda is a 66 y.o. male referred by:   [x] Physician  [] Nursing  [] Other:     Wound Identification:  Wound Type: pressure  Contributing Factors: chronic pressure, decreased mobility, shear force, obesity and decreased tissue oxygenation     Chronic wound but very clean. Patient states the wound has been present \"a long time\". Slightly hypergranular, smooth, pink, wet wound bed.              Objective:      BP (!) 158/37   Pulse 68   Temp 98.8 °F (37.1 °C) (Temporal)   Resp 18   Ht 6' 1\" (1.854 m)   Wt 227 lb 15.3 oz (103.4 kg)   SpO2 94%   BMI 30.08 kg/m²   Stvee Risk Score: Steve Scale Score: 17    LABS    CBC:   Lab Results   Component Value Date    WBC 4.7 10/26/2020    RBC 2.88 10/26/2020    RBC 5.34 2017    HGB 8.3 10/26/2020     CMP:  Albumin:    Lab Results   Component Value Date    LABALBU 3.4 10/24/2020    LABALBU 3.9 2017     PT/INR:    Lab Results   Component Value Date    PROTIME 13.2 10/24/2020    INR 1.3 10/24/2020     HgBA1c:  No results found for: LABA1C  PTT: No components found for: LABPTT      Assessment:       Measurements:     10/26/20 0935   Wound 10/24/20 Buttocks coccyx to left buttock   Date First Assessed/Time First Assessed: 10/24/20 2020   Present on Hospital Admission: Yes  Primary Wound Type: Pressure Injury  Location: Buttocks  Wound Description (Comments): coccyx to left buttock   Wound Image    Wound Etiology Pressure Stage  3   Dressing Status New dressing applied   Wound Cleansed Soap and water   Dressing/Treatment Foam   Dressing Change Due 10/29/20   Wound Length (cm) 5.8 cm   Wound Width (cm) 3.5 cm   Wound Surface Area (cm^2) 20.3 cm^2   Wound Assessment Subcutaneous; Hyper granulation tissue   Drainage Amount Moderate   Drainage Description Serosanguinous   Odor None   [place]-wound Assessment Hyperpigmented;Fragile;Dry/flaky            Response to treatment:  Well tolerated by patient. Plan:     Plan of Care: Turn every 2 hours  Use lift sling to reposition patient to minimize potential for shear injury. Silicone foam to the coccyx to left buttock pressure injury. Change every 3 days. Please refrain from applying zinc paste under the foam dressing. Routine incontinence care with incontinence barrier cloths and zinc oxide cream. Apply zinc oxide cream BID and prn incontinence to the intact skin affected by incontinence. Keep off of the wound bed. .   Moisture wicking under pads vs cloth backed briefs      Specialty Bed Required : Yes: [place] Dream Air. [x] Low Air Loss   [x] Pressure Redistribution  [] Fluid Immersion  [] Bariatric  [] Total Pressure Relief  [] Other:     Discharge Plan:  TBD    Patient/Caregiver Teaching:  Please allow positioning to stay off of the wound.    [] Indicates understanding       [] Needs reinforcement  [] Unsuccessful      [x] Verbal Understanding  [] Demonstrated understanding       [] No evidence of learning  [] Refused teaching         [] N/A       Electronically signed by Cal Mcconnell RN, CWON on 10/26/2020 at 12:54 PM

## 2020-10-26 NOTE — DISCHARGE INSTR - COC
Continuity of Care Form    Patient Name: Mo Interiano   :  1945  MRN:  [id number]    Admit date:  10/24/2020  Discharge date:  ***    Code Status Order: Full Code   Advance Directives:     Admitting Physician:  Ladonna Noriega MD  PCP: Huey Lacey MD    Discharging Nurse: [facility] Unit/Room#: 5258/6390-10  Discharging Unit Phone Number: ***    Emergency Contact:   Extended Emergency Contact Information  Primary Emergency Contact: [place]  Address: [address] [place] [address], [address] [place] [address] [facility] Phone: [phone]  Mobile Phone: [phone]  Relation: Spouse  Secondary Emergency Contact: Jimmy [address] Phone: [phone]  Relation: Child    Past Surgical History:  Past Surgical History:   Procedure Laterality Date    HERNIA REPAIR      KIDNEY TRANSPLANT N/A     pt thinks it was the right side    LEG AMPUTATION BELOW KNEE Right 2018    TUMOR EXCISION         Immunization History: There is no immunization history on file for this patient.     Active Problems:  Patient Active Problem List   Diagnosis Code    Shortness of breath R06.02    Acute respiratory acidosis E87.2    Acute on chronic respiratory failure with hypoxia and hypercapnia (HCC) J96.21, J96.22    Acute on chronic diastolic (congestive) heart failure (HCC) I50.33    Hypernatremia E87.0    CYNTHIA (acute kidney injury) ([facility] N17.9    Normocytic anemia D64.9    Atrial flutter with controlled response (HCC) I48.92    Anemia of chronic renal failure N18.9, D63.1    Kidney transplant recipient Z94.0    Hyperlipidemia E78.5    Nonproliferative diabetic retinopathy ([facility] S35.6558    Obstructive sleep apnea syndrome G47.33    Stage 3 chronic kidney disease N18.30    Uncontrolled type 2 diabetes mellitus (HCC) E11.65    S/P bilateral BKA (below knee amputation) ([facility] Z89.512, Z89.511    Acute cystitis with hematuria N30.01    Sacral decubitus ulcer L89.159    Hypokalemia E87.6       Isolation/Infection:   Isolation          No Isolation        Patient Infection Status     None to display          Nurse Assessment:  Last Vital Signs: BP (!) 158/37   Pulse 68   Temp 98.8 °F (37.1 °C) (Temporal)   Resp 18   Ht 6' 1\" (1.854 m)   Wt 227 lb 15.3 oz (103.4 kg)   SpO2 94%   BMI 30.08 kg/m²     Last documented pain score (0-10 scale): Pain Level: 0  Last Weight:   Wt Readings from Last 1 Encounters:   10/26/20 227 lb 15.3 oz (103.4 kg)     Mental Status:  {IP PT MENTAL STATUS:20030}    IV Access:  { MILE IV ACCESS:426899315}    Nursing Mobility/ADLs:  Walking   {P DME DTLW:617344386}  Transfer  {P DME SNPU:836082346}  Bathing  {P DME AVXL:723003069}  Dressing  {P DME HDKY:887693876}  Toileting  {P DME ROXC:366602102}  Feeding  {[facility] DME HCFK:597452893}  Med Admin  {[facility] DME TDJK:710001992}  Med Delivery   { MILE MED Delivery:031299468}    Wound Care Documentation and Therapy:  Wound 10/24/20 Buttocks stage 2 pressure ulcer (Active)   Wound Etiology Pressure Stage  2 10/26/20 0349   Dressing Status New dressing applied 10/26/20 1000   Wound Cleansed Cleansed with saline 10/26/20 0349   Dressing/Treatment Silicone pad; Foam 10/26/20 1000   Dressing Change Due 10/27/20 10/26/20 1000   Wound Assessment Subcutaneous 10/26/20 0349   Drainage Amount Small 10/26/20 0349   Drainage Description Serosanguinous 10/26/20 0349   Number of days: 1        Elimination:  Continence:   · Bowel: {YES / BU:64370}  · Bladder: {YES / YL:23064}  Urinary Catheter: {Urinary Catheter:883740899}   Colostomy/Ileostomy/Ileal Conduit: {YES / BA:10204}       Date of Last BM: ***    Intake/Output Summary (Last 24 hours) at 10/26/2020 1233  Last data filed at 10/26/2020 1228  Gross per 24 hour   Intake 1758 ml   Output 3750 ml   Net -1992 ml     I/O last 3 completed shifts: In: 8418 [P.O.:1300;  I.V.:188; IV Piggyback:50]  Out: 2600 [Urine:2600]    Safety diagnosis listed and that he requires {Admit to Appropriate Level of Care:18599} for {GREATER/LESS:306969801} 30 days.      Update Admission H&P: {CHP DME Changes in TZLXA:551694018}    PHYSICIAN SIGNATURE:  {Esignature:260190685}

## 2020-10-26 NOTE — PLAN OF CARE
Problem: Pain:  Goal: Pain level will decrease  Description: Pain level will decrease  10/26/2020 0520 by Sybil Izaguirre RN  Outcome: Ongoing  10/25/2020 1532 by Vilma Mcguire RN  Outcome: Ongoing  Goal: Control of acute pain  Description: Control of acute pain  10/26/2020 0520 by Sybil Izaguirre RN  Outcome: Ongoing  10/25/2020 1532 by Vilma Mcguire RN  Outcome: Ongoing  Goal: Control of chronic pain  Description: Control of chronic pain  10/26/2020 0520 by Sybil Izaguirre RN  Outcome: Ongoing  10/25/2020 1532 by Vilma Mcguire RN  Outcome: Ongoing     Problem: Skin Integrity:  Goal: Will show no infection signs and symptoms  Description: Will show no infection signs and symptoms  10/26/2020 0520 by Sybil Izaguirre RN  Outcome: Ongoing  10/25/2020 1532 by Vilma Mcguire RN  Outcome: Ongoing  Goal: Absence of new skin breakdown  Description: Absence of new skin breakdown  10/26/2020 0520 by Sybil Izaguirre RN  Outcome: Ongoing  10/25/2020 1532 by Vilma Mcguire RN  Outcome: Ongoing     Problem: Falls - Risk of:  Goal: Will remain free from falls  Description: Will remain free from falls  10/26/2020 0520 by Sybil Izaguirre RN  Outcome: Ongoing  10/25/2020 1532 by Vilma Mcguire RN  Outcome: Ongoing  Goal: Absence of physical injury  Description: Absence of physical injury  10/26/2020 0520 by Sybil Izaguirre RN  Outcome: Ongoing  10/25/2020 1532 by Vilma Mcguire RN  Outcome: Ongoing     Problem: Restraint Use - Nonviolent/Non-Self-Destructive Behavior:  Goal: Absence of restraint indications  Description: Absence of restraint indications  10/26/2020 0520 by Sybil Izaguirre RN  Outcome: Ongoing  10/25/2020 1532 by Vilma Mcguire RN  Outcome: Ongoing  Goal: Absence of restraint-related injury  Description: Absence of restraint-related injury  10/26/2020 0520 by Sybil Izaguirre RN  Outcome: Ongoing  10/25/2020 1532 by Vilma Mcguire RN  Outcome: Ongoing

## 2020-10-27 NOTE — PROGRESS NOTES
Staffing office notified of need for guard for patient. Staff at bedside / one to one monitoring as patient continues to pull off monitor / pulling at tubings and yelling, attempting to get out of bed. Oriented to self only. Attempts to reorient unsuccessful. Right groin site remains clean soft and dry.

## 2020-10-27 NOTE — PROGRESS NOTES
Pt arrived in [facility] 6. Unable to tell writer where he was. Asking writer Rina Beard is your sisters name again\"? Also said, \"your all grown up, you've been to college now, right? Dr. Rony Jeter in to see patient. Wife called per writer and she was on her way to hospital. Directed to [facility]. States she was not aware he was having procedure. Dr. More Miller informed.

## 2020-10-27 NOTE — PROGRESS NOTES
[facility]  Internal Medicine Teaching Residency Program  Inpatient Daily Progress Note  ______________________________________________________________________________    Patient: Rosana Fernando  YOB: 1945   ZLE:[id number]    Acct: [de-identified]     Room: [address] [place] [address]  Admit date: 10/24/2020  Today's date: 10/27/20  Number of days in the hospital: 3    SUBJECTIVE   Admitting Diagnosis: Acute on chronic respiratory failure with hypoxia and hypercapnia (HCC)  CC: Shortness of breath  Pt examined at bedside. Chart & results reviewed. Patient is alert and oriented in time, place and person. Denies chest pain, abdominal pain, fever, chills, nausea and vomiting     hemodynamically and vitally stable   BP: 105/72, HR: 72, RR:21    Creat trending down: 1.80> 1.58> 1.50> 1.44  Urine culture show non lactose fermenting. Input/output: 1171/3175      Review of Systems   Constitutional: Negative for chills and fever. HENT: Negative. Eyes: Negative. Respiratory: Negative for cough. Cardiovascular: Negative for chest pain. Gastrointestinal: Negative for abdominal pain, diarrhea, nausea and vomiting. Endocrine: Negative. Genitourinary: Negative for difficulty urinating and hematuria. Musculoskeletal: Positive for back pain. Hematological: Negative. Psychiatric/Behavioral: Positive for decreased concentration. Negative for behavioral problems, confusion and sleep disturbance. The patient is not nervous/anxious.         BRIEF HISTORY   78-year-old -American male with past medical history of renal failure s/p renal transplant (2012), congestive heart failure with preserved ejection fraction with greater than 70%, COPD on 2 L home oxygen, pulmonary hypertension with calculated RVSP 64 mmHg, diabetes mellitus type 2, hypertension, chronic immune suppression on Myfortic and tacrolimus, bilateral below-knee amputation, A. fib on Eliquis, presented to the emergency department with shortness of breath.  He was transferred from other hospital due to nonavailability of beds.      Patient is a poor historian, confused.  Was recently discharge from [facility] after respiratory failure due to CHF and pneumonia where he was intubated.  Reports of increasing shortness of breath, chest pain, swelling of the body.  Denies fever, chills, abdominal pain, common cold. Patient wife states that her  reports back pain due to sacral decubitis      OBJECTIVE     Vital Signs:  /72   Pulse 72   Temp 97.7 °F (36.5 °C) (Axillary)   Resp 21   Ht 6' 1\" (1.854 m)   Wt 228 lb 2.8 oz (103.5 kg)   SpO2 97%   BMI 30.10 kg/m²     Temp (24hrs), Av.9 °F (36.6 °C), Min:97.5 °F (36.4 °C), Max:98.8 °F (37.1 °C)    In: 711   Out: 1625 [Urine:1625]    Physical Exam:  Constitutional: This is a well developed, well nourished, 25-29.9 - Overweight 66y.o. year old male who is alert, oriented, cooperative and in no apparent distress. Head:normocephalic and atraumatic. EENT:  PERRLA. No conjunctival injections. Septum was midline, mucosa was without erythema, exudates or cobblestoning. No thrush was noted. Neck: Supple without thyromegaly. No elevated JVP. Trachea was midline. Respiratory: Chest was symmetrical without dullness to percussion. Breath sounds bilaterally were clear to auscultation. There were no wheezes, rhonchi or rales. There is no intercostal retraction or use of accessory muscles. No egophony noted. Cardiovascular: Regular without murmur, clicks, gallops or rubs. Abdomen: Slightly rounded and soft without organomegaly. No rebound, rigidity or guarding was appreciated. Lymphatic: No lymphadenopathy. Musculoskeletal: Normal curvature of the spine. No gross muscle weakness. Extremities:  No lower extremity edema, ulcerations, tenderness, varicosities or erythema.   Muscle size, tone and strength are normal.  No involuntary PT/INR:   Recent Labs     10/24/20  1328   PROTIME 13.2*   INR 1.3     APTT:   Recent Labs     10/26/20  1558 10/26/20  2320 10/27/20  0609   APTT 54.3* 59.4* 57.2*     FASTING LIPID PANEL:  Lab Results   Component Value Date    CHOL 166 05/11/2017    HDL 43 05/11/2017    TRIG 132 05/11/2017     LIVER PROFILE:   Recent Labs     10/24/20  1328   AST 14   ALT 22   BILITOT 0.80   ALKPHOS 109      MICROBIOLOGY:   Lab Results   Component Value Date/Time    CULTURE (A) 10/25/2020 06:14 PM     NON LACTOSE FERMENTING GRAM NEGATIVE RODS <44596 CFU/ML       Imaging:    Xr Chest Portable    Result Date: 10/24/2020  Cardiomegaly with hydrostatic edema and small layering pleural effusions in keeping with acute congestive heart failure. ASSESSMENT & PLAN     ASSESSMENT / PLAN:     Principal Problem:    Acute on chronic respiratory failure with hypoxia and hypercapnia (HCC)  Plan:     1. Acute on chronic diastolic heart failure: Improving: On cardiac diet fluid restriction 1500, strict input output record, daily weight, nitro drip and Bumex 2 mg 2 times daily. Follow up with nephro. 2. Acute hypoxic and hypercapnia respiratory failure: Due to #1. improving. Continue diuresis, wean off oxygen. 3. Acute metabolic encephalopathy:Improving: multifactorial due to uti and co2 narcosis. 4. Essential hypertension: Controlled. continue amlodipine 10 mg and Lopressor 25 mg twice daily. 5. Elevated troponin: possibly due to underlying demand ischemia, or CYNTHIA or infection. Cardiology on board, cath planned for today if stable. 6. Urinary tract infection: continue ceftriaxone. 7. Diabetes mellitus Type II: [id number]-9.6 (2/14/20) on high-dose insulin corrective algorithm and lantus 20 units nightly. On hypoglycemic protocol. 8. Acute kidney injury on top of CKD stage III- Improving- Continue to monitor BMP. Nephrology following. 9. Hypokalemia. 3.5, replace potassium.   10. Hyperlipidemia-stable, started on Lipitor 40 mg daily  11. A. Fib: On rate control with Lopressor.  on heparin drip. 12. COPD: Stable will resume home meds on discharge  13. S/p renal transplant: On immunosuppressive agent.  Tacrolimus and mycophenolate sodium. Nephrology on board.   14. Obstructive sleep apnea- Bipap. 15. Anemia of chronic disease: Due to CRF-will monitor hemoglobin  16. DVT Prophylaxis : on heparin. Rebecca Pierce MD  Internal Medicine Resident, PGY-1  [facility];  [place], [place]  10/27/2020, 7:23 AM

## 2020-10-27 NOTE — PROGRESS NOTES
Received post procedure to [facility] to room 6 while awaiting bed placement [facility]. Assessment obtained. Post procedure pathway initiated. Right groin site soft , band aid dry and intact. No hematoma noted. Wife spoke with MD and had gone home. Patient sleeping resting quietly, without distress. Head of bed flat with right leg straight.  Bilat BKA

## 2020-10-27 NOTE — OP NOTE
[facility]        Date:   10/27/2020  Patient name:  Anthony Vargas  Date of admission:  10/24/2020  4:14 PM  MRN:   [id number]  YOB: 1945    CARDIAC CATHETERIZATION    Operators:  Kelly Love MD      Procedure performed:       [x] Left Heart Catheterization. [] Graft Angiography.  [] Left Ventriculography. [] Right Heart Catheterization. [x] Coronary Angiography. [] Aortic Valve Studies. [] PCI:      [] Other:       Pre Procedure Conscious Sedation Data:    ASA Class:    [] I [] II [x] III [] IV    Mallampati Class:  [] I [] II [x] III [] IV      Indication:  [] STEMI      [] + Stress test  [x] ACS      [] + EKG Changes  [] Non Q MI       [] Significant Risk Factors  [] Recurrent Angina             [] Diabetes Mellitus    [] New LBBB      [] Uncontrolled HTN. [] CHF / Low EF changes     [] Abnormal CTA / Ca Score  [] Other:     Procedure:  Access:  [x] Femoral artery  [] Radial  artery       [x] Right   [] Left    Procedure: After informed consent was obtained with explanation of the risks and benefits, patient was brought to the cath lab. The access area was prepped and draped in sterile fashion. 1% lidocaine was used for local block. The artery was cannulated with 6  Fr sheath with brisk arterial blood return. The side port was frequently flushed and aspirated with normal saline. Estimated blood loss: 10 ml    Findings:    LMCA: Diffuse irregularities 20-30%.     LAD: Diffuse irregularities 30-40% and Single stenosis. Lesion on Mid LAD: 80% stenosis 50 mm length. Lesion plaque is ruptured. Comments:heavily calcified. LCx: Multiple stenosis. Lesion on Prox CX: 90% stenosis. Lesion on 1st Ob Yue: Mid subsection. 75% stenosis. Lesion on 2nd Ob Yue: Mid subsection. 80% stenosis. RCA: Single stenosis. with patent distal stent       Lesion on Mid RCA: 75% stenosis.       Coronary Tree      Dominance: Right Ventriculography Findings:  LV was not done        Conclusions:   Multi-vessel Coronary Artery Disease. Long lesions, heavily calcified   LV was not done. Recommendations   CTS consult for CABG. Medical therapy as needed. Risk factor modification. History and Risk Factors    [x] Hypertension     [] Family history of CAD  [x] Hyperlipidemia     [] Cerebrovascular Disease   [x] Prior MI       [] Peripheral Vascular disease   [x] Prior PCI              [x] Diabetes Mellitus    [] Left Main PCI. [] Currently on Dialysis. [] Prior CABG. [] Currently smoker. [] Cardiac Arrest outside of healthcare facility. [] Yes    [x] No        Witnessed     [] Yes   [] No     Arrest after arrival of EMS  [] Yes   [] No     [] Cardiac Arrest at other Facility. [] Yes   [] No    Pre-Procedure Information. Heart Failure       [] Yes    [x] No        Class  [] I      [] II  [] III    [] IV. New Diagnosis    [] Yes  [] No    HF Type      [] Systolic   [] Diastolic          [] Unknown. Diagnostic Test:   EKG       [x] Normal   [] Abnormal    New antiarrhythmia medications:    [] Yes   [] No   New onset atrial fibrillation / Flutter     [] Yes   [] No   ECG Abnormalities:      [] V. Fib   [] Ashley V. Tach           [] NS V. T   [] New LBBB           [] T. Inv  []  ST dev > 0.5 mm         [] PVC's freq  [] PVC's infrequent    Stress Test Performed:      [] Yes    [x] No     Type:     [] Stress Echo   [] Exercise Stress Test (no imaging)      [] Stress Nuclear  [] Stress Imaging     Results   [] Negative   [] Positive        [] Indeterminate  [] Unavailable     If Positive/ Risk / Extent of Ischemia:       [] Low  [] Intermediate         [] High  [] Unavailable      Cardiac CTA Performed:     [] Yes    [x] No      Results   [] CAD   [] Non obstructive CAD      [] No CAD   [] Uncertain      [] Unknown   [] Structural Disease.      Pre Procedure Medications:   [x] Yes    [] No         [] ASA  [x] Beta Blockers      [] Nitrate  [] Ca Channel Blockers      [] Ranolazine  [x] Statin       [] Plavix/Others antiplatelets          Rosibel Eller MD  Fellow, [id number] [facility]          Physician Statement  I have discussed the case of Arlene Sandoval including pertinent history and exam findings with the resident. I have seen and examined the patient and the key elements of the encounter have been performed by me. I agree with the assessment, plan and orders as documented by the resident With changes made to the note. Procedure performed by me.     Electronically signed by Eliza Hancock MD on 10/28/2020 at 4:23 PM.    [facility]      [phone]

## 2020-10-27 NOTE — PROGRESS NOTES
Renal Progress Note    Patient :  Zev Murillo; 66 y.o. MRN# [id number]  Location:  5192/5631-00  Attending:  Natali Madrid MD  Admit Date:  10/24/2020   Hospital Day: 3      Subjective:     Admitted for shortness of breath which was worsening progressively the ECF. Similar episode few weeks earlier. Ruled in for non-ST elevation MI. Plan for cardiac catheterization, procedure was postponed yesterday and hopefully will be done today. Creatinine down to 1.3. Blood pressure slightly low  Oral intake good. Urine output good. Santos catheter in place, placed before he came into the hospital at [facility]. Shortness of breath better. Troponins have gone up slightly. Cardiology input noted, 2D echocardiogram reviewed. Plan for cardiac catheterization noted. No cough phlegm or hemoptysis. History reviewed,  donor kidney transplant in  at [address] Primary disease not known. Does have diabetes now with with suboptimal control, hemoglobin A1c in the 8-9 range. Not sure if he had diabetes prior to the transplant as well. Recent 2D echocardiogram reviewed. Ejection fraction preserved. Has been on tacrolimus MMF and prednisone.     Outpatient Medications:     Medications Prior to Admission: Pollen Extracts (PROSTAT PO), Take by mouth  acetaminophen (TYLENOL) 325 MG tablet, Take 650 mg by mouth every 6 hours as needed for Pain  apixaban (ELIQUIS) 5 MG TABS tablet, Take by mouth 2 times daily  atorvastatin (LIPITOR) 40 MG tablet, Take 40 mg by mouth nightly  vitamin D (CHOLECALCIFEROL) 125 MCG (5000 UT) CAPS capsule, Take 5,000 Units by mouth daily  docusate sodium (COLACE) 100 MG capsule, Take 100 mg by mouth 3 times daily  magnesium oxide (MAG-OX) 400 MG tablet, Take 400 mg by mouth daily  mycophenolate (MYFORTIC) 180 MG DR tablet, Take 540 mg by mouth 2 times daily  ondansetron (ZOFRAN) 4 MG tablet, Take 4 mg by mouth every 8 hours as needed for Nausea or Vomiting  POTASSIUM CHLORIDE ER PO, Take 20 mEq by mouth daily  b complex vitamins capsule, Take 1 capsule by mouth daily  tacrolimus (PROGRAF) 1 MG capsule, Take 3 mg by mouth 2 times daily  aspirin (ASPIRIN 81) 81 MG EC tablet, Take by mouth daily   furosemide (LASIX) 40 MG tablet, Take 40 mg by mouth 2 times daily  ertapenem (INVANZ) 1 GM injection, Inject 1,000 mg into the muscle every 24 hours  dorzolamide (TRUSOPT) 2 % ophthalmic solution, Place 1 drop into the right eye 2 times daily   albuterol (PROVENTIL) (2.5 MG/3ML) 0.083% nebulizer solution, Take 2.5 mg by nebulization every 4 hours as needed for Wheezing   polyethylene glycol (GLYCOLAX) 17 g packet, Take 17 g by mouth daily   metoprolol tartrate (LOPRESSOR) 25 MG tablet, Take 25 mg by mouth 2 times daily  sennosides-docusate sodium (SENOKOT-S) 8.6-50 MG tablet, Take 2 tablets by mouth daily   predniSONE (DELTASONE) 5 MG tablet, Take 5 mg by mouth daily  tamsulosin (FLOMAX) 0.4 MG capsule, Take 0.4 mg by mouth nightly   [DISCONTINUED] apixaban (ELIQUIS) 2.5 MG TABS tablet, Take by mouth 2 times daily  amLODIPine (NORVASC) 5 MG tablet, Take 10 mg by mouth daily   insulin glargine (BASAGLAR KWIKPEN) 100 UNIT/ML injection pen, Inject 30 Units into the skin daily   bisacodyl (DULCOLAX) 10 MG suppository, Place 10 mg rectally daily  bumetanide (BUMEX) 1 MG tablet, Take 2 mg by mouth 2 times daily  colchicine (COLCRYS) 0.6 MG tablet, Take 0.6 mg by mouth daily  gabapentin (NEURONTIN) 300 MG capsule, Take 300 mg by mouth 3 times daily. .  insulin lispro (HUMALOG) 100 UNIT/ML injection vial, Inject into the skin 3 times daily (before meals)  [DISCONTINUED] dorzolamide-timolol (COSOPT) 22.3-6.8 MG/ML ophthalmic solution,   [DISCONTINUED] acetaminophen (TYLENOL) 500 MG tablet, Take 500 mg by mouth every 6 hours as needed for Pain  [DISCONTINUED] aspirin 81 MG tablet, Take 81 mg by mouth daily  [DISCONTINUED] atorvastatin (LIPITOR) 20 MG tablet, Take 40 mg by mouth daily [DISCONTINUED] insulin lispro (HUMALOG) 100 UNIT/ML injection vial, Inject 15 Units into the skin 3 times daily (before meals)     Current Medications:     Scheduled Meds:    amLODIPine  5 mg Oral Nightly    [START ON 10/28/2020] torsemide  40 mg Oral Daily    acetylcysteine  600 mg Oral BID    tacrolimus  3 mg Oral BID    predniSONE  5 mg Oral Daily    mycophenolate  540 mg Oral BID    insulin lispro  0-6 Units Subcutaneous TID WC    insulin lispro  0-3 Units Subcutaneous Nightly    insulin glargine  10 Units Subcutaneous Nightly    cefTRIAXone (ROCEPHIN) IV  1 g Intravenous Q24H    atorvastatin  40 mg Oral Nightly    tamsulosin  0.4 mg Oral Daily    sennosides-docusate sodium  1 tablet Oral Daily    sodium chloride flush  10 mL Intravenous 2 times per day    metoprolol tartrate  25 mg Oral BID     Continuous Infusions:    sodium chloride 50 mL/hr at 10/27/20 0530    heparin (PORCINE) Infusion 13.1 Units/kg/hr (10/27/20 0628)    dextrose       PRN Meds:  sodium chloride flush, acetaminophen **OR** acetaminophen, polyethylene glycol, promethazine **OR** ondansetron, heparin (porcine), heparin (porcine), glucose, dextrose, glucagon (rDNA), dextrose    Input/Output:       I/O last 3 completed shifts: In: 8513 [P.O.:720; I.V.:451]  Out: 5795 [Urine:3175]. Patient Vitals for the past 96 hrs (Last 3 readings):   Weight   10/27/20 0541 228 lb 2.8 oz (103.5 kg)   10/26/20 0523 227 lb 15.3 oz (103.4 kg)   10/25/20 0404 231 lb 7.7 oz (105 kg)       Vital Signs:   Temperature:  Temp: 97.7 °F (36.5 °C)  TMax:   Temp (24hrs), Av.7 °F (36.5 °C), Min:97.5 °F (36.4 °C), Max:97.8 °F (36.6 °C)    Respirations:  Resp: 21  Pulse:   Pulse: 72  BP:    BP: 105/72  BP Range: Systolic (62SMJ), DRK:772 , Min:105 , QZI:992       Diastolic (42ZVA), JBX:40, Min:50, Max:110      Physical Examination:     General:  AAO x 3, speaking in full sentences, no accessory muscle use.   HEENT: Atraumatic, normocephalic, no drugs/contrast exposure. We will continue to follow along with you.

## 2020-10-27 NOTE — PLAN OF CARE
Problem: Skin Integrity:  Goal: Will show no infection signs and symptoms  Description: Will show no infection signs and symptoms  Outcome: Ongoing     Problem: Skin Integrity:  Goal: Absence of new skin breakdown  Description: Absence of new skin breakdown  Outcome: Ongoing     Problem: Musculor/Skeletal Functional Status  Goal: Highest potential functional level  Outcome: Ongoing     Problem: Cardiac:  Goal: Ability to maintain an adequate cardiac output will improve  Description: Ability to maintain an adequate cardiac output will improve  Outcome: Ongoing     Problem: Cardiac:  Goal: Hemodynamic stability will improve  Description: Hemodynamic stability will improve  Outcome: Ongoing     Problem: Respiratory:  Goal: Respiratory status will improve  Description: Respiratory status will improve  Outcome: Ongoing   Electronically signed by Sunday Pierce RN on 10/27/2020 at 2:05 AM

## 2020-10-27 NOTE — PROGRESS NOTES
Physical Therapy  DATE: 10/27/2020    NAME: Abigail Eaton  MRN: [id number]   : 1945    Patient not seen this date for Physical Therapy due to:  [] Blood transfusion in progress  [] Hemodialysis  []  Patient Declined  [] Spine Precautions   [] Strict Bedrest  [x] Surgery/ Procedure--pt going for cardiac cath; ck 10/28/20  [] Testing      [] Other        [] PT being discontinued at this time. Patient independent. No further needs. [] PT being discontinued at this time as the patient has been transferred to palliative care. No further needs.     Alivia Davies, PT

## 2020-10-28 PROBLEM — I25.10 MULTIPLE VESSEL CORONARY ARTERY DISEASE: Status: ACTIVE | Noted: 2020-01-01

## 2020-10-28 NOTE — PLAN OF CARE
Problem: Pain:  Goal: Pain level will decrease  Description: Pain level will decrease  Outcome: Ongoing  Goal: Control of acute pain  Description: Control of acute pain  Outcome: Ongoing  Goal: Control of chronic pain  Description: Control of chronic pain  Outcome: Ongoing     Problem: Skin Integrity:  Goal: Will show no infection signs and symptoms  Description: Will show no infection signs and symptoms  Outcome: Ongoing  Goal: Absence of new skin breakdown  Description: Absence of new skin breakdown  Outcome: Ongoing     Problem: Falls - Risk of:  Goal: Will remain free from falls  Description: Will remain free from falls  Outcome: Ongoing  Goal: Absence of physical injury  Description: Absence of physical injury  Outcome: Ongoing     Problem: Restraint Use - Nonviolent/Non-Self-Destructive Behavior:  Goal: Absence of restraint indications  Description: Absence of restraint indications  Outcome: Ongoing  Goal: Absence of restraint-related injury  Description: Absence of restraint-related injury  Outcome: Ongoing     Problem: Musculor/Skeletal Functional Status  Goal: Highest potential functional level  Outcome: Ongoing     Problem: Cardiac:  Goal: Ability to maintain an adequate cardiac output will improve  Description: Ability to maintain an adequate cardiac output will improve  Outcome: Ongoing  Goal: Hemodynamic stability will improve  Description: Hemodynamic stability will improve  Outcome: Ongoing     Problem: Fluid Volume:  Goal: Ability to achieve and maintain adequate urine output will improve  Description: Ability to achieve and maintain adequate urine output will improve  Outcome: Ongoing     Problem: Respiratory:  Goal: Respiratory status will improve  Description: Respiratory status will improve  Outcome: Ongoing

## 2020-10-28 NOTE — PROGRESS NOTES
Pt to room from cath lab by cath lab team. VSS and placed on 4 L simple mask. Groin soft, no hematoma or oozing noted.  Will continue to monitor

## 2020-10-28 NOTE — PROGRESS NOTES
Renal Progress Note    Patient :  Lizz Anglin; 66 y.o. MRN# [id number]  Location:  1012/1012-01  Attending:  Rm Cardozo MD  Admit Date:  10/24/2020   Hospital Day: 4      Subjective:     Patient was seen and examined. No new issues reported overnight. Patient did have cardiac catheterization yesterday and was found to have multivessel coronary artery disease, CT surgery consulted for possible CABG. Serum creatinine did improve to 1.20 mg/DL today. Urine output documented as 1.4 L and x2 more in the last 24 hours. Currently on tacrolimus 3 mg twice a day, Myfortic 540 mg twice a day and prednisone 5 mg daily.     Outpatient Medications:     Medications Prior to Admission: Pollen Extracts (PROSTAT PO), Take by mouth  acetaminophen (TYLENOL) 325 MG tablet, Take 650 mg by mouth every 6 hours as needed for Pain  apixaban (ELIQUIS) 5 MG TABS tablet, Take by mouth 2 times daily  atorvastatin (LIPITOR) 40 MG tablet, Take 40 mg by mouth nightly  vitamin D (CHOLECALCIFEROL) 125 MCG (5000 UT) CAPS capsule, Take 5,000 Units by mouth daily  docusate sodium (COLACE) 100 MG capsule, Take 100 mg by mouth 3 times daily  magnesium oxide (MAG-OX) 400 MG tablet, Take 400 mg by mouth daily  mycophenolate (MYFORTIC) 180 MG DR tablet, Take 540 mg by mouth 2 times daily  ondansetron (ZOFRAN) 4 MG tablet, Take 4 mg by mouth every 8 hours as needed for Nausea or Vomiting  POTASSIUM CHLORIDE ER PO, Take 20 mEq by mouth daily  b complex vitamins capsule, Take 1 capsule by mouth daily  tacrolimus (PROGRAF) 1 MG capsule, Take 3 mg by mouth 2 times daily  aspirin (ASPIRIN 81) 81 MG EC tablet, Take by mouth daily   furosemide (LASIX) 40 MG tablet, Take 40 mg by mouth 2 times daily  ertapenem (INVANZ) 1 GM injection, Inject 1,000 mg into the muscle every 24 hours  dorzolamide (TRUSOPT) 2 % ophthalmic solution, Place 1 drop into the right eye 2 times daily   albuterol (PROVENTIL) (2.5 MG/3ML) 0.083% nebulizer solution, Take 2.5 mg  insulin glargine  10 Units Subcutaneous Nightly    cefTRIAXone (ROCEPHIN) IV  1 g Intravenous Q24H    atorvastatin  40 mg Oral Nightly    tamsulosin  0.4 mg Oral Daily    sennosides-docusate sodium  1 tablet Oral Daily    sodium chloride flush  10 mL Intravenous 2 times per day     Continuous Infusions:    heparin (PORCINE) Infusion 13.9 Units/kg/hr (10/28/20 1146)    dextrose       PRN Meds:  sodium chloride flush, acetaminophen, labetalol, sodium chloride flush, acetaminophen **OR** acetaminophen, polyethylene glycol, promethazine **OR** ondansetron, heparin (porcine), heparin (porcine), glucose, dextrose, glucagon (rDNA), dextrose    Input/Output:       I/O last 3 completed shifts: In: 0476 [I.V.:1255]  Out: 1425 [Urine:1425]. Patient Vitals for the past 96 hrs (Last 3 readings):   Weight   10/27/20 0541 228 lb 2.8 oz (103.5 kg)   10/26/20 0523 227 lb 15.3 oz (103.4 kg)   10/25/20 0404 231 lb 7.7 oz (105 kg)       Vital Signs:   Temperature:  Temp: 98 °F (36.7 °C)  TMax:   Temp (24hrs), Av °F (36.7 °C), Min:97.7 °F (36.5 °C), Max:98.2 °F (36.8 °C)    Respirations:  Resp: 20  Pulse:   Pulse: 69  BP:    BP: (!) 156/65  BP Range: Systolic (87SXP), NDN:572 , Min:126 , YCW:037       Diastolic (81XQP), AYY:38, Min:28, Max:124      Physical Examination:     General:  AAO x 3, speaking in full sentences, no accessory muscle use. HEENT: Atraumatic, normocephalic, no throat congestion, moist mucosa. Eyes:   Pupils equal, round and reactive to light, EOMI. Neck:   Supple  Chest:   Bilateral vesicular breath sounds, no rales or wheezes. Cardiac:  S1 S2 RR, no murmurs, gallops or rubs. Abdomen: Soft, non-tender, no masses or organomegaly, BS audible. :   No suprapubic or flank tenderness. Neuro:  AAO x 3, No FND. SKIN:  No rashes, good skin turgor. Extremities:  No edema. Bilateral lower extremity amputations.     Labs:       Recent Labs     10/26/20  0311 10/27/20  0609 10/28/20  0418   WBC 4.7 3.7 4.4   RBC 2.88* 2.87* 3.23*   HGB 8.3* 8.6* 9.2*   HCT 29.2* 29.4* 33.6*   .4 102.4 104.0*   MCH 28.8 30.0 28.5   MCHC 28.4 29.3 27.4*   RDW 18.5* 19.5* 18.3*   * 156 144   MPV 12.2 13.2 12.3      BMP:   Recent Labs     10/26/20  0311 10/27/20  0904 10/28/20  0418    143 146*   K 3.5* 3.6* 4.4    105 105   CO2 28 31 27   BUN 55* 43* 42*   CREATININE 1.44* 1.29* 1.20   GLUCOSE 113* 147* 206*   CALCIUM 9.3 9.5 10.0      Magnesium:    Recent Labs     10/25/20  2240 10/26/20  0311   MG 2.3 2.3     SPEP:  Lab Results   Component Value Date    PROT 7.2 10/24/2020    PROT 7.2 2017     Urinalysis/Chemistries:      Lab Results   Component Value Date    NITRU NEGATIVE 10/25/2020    COLORU YELLOW 10/25/2020    PHUR 6.5 10/25/2020    WBCUA TOO NUMEROUS TO COUNT 10/25/2020    RBCUA TOO NUMEROUS TO COUNT 10/25/2020    MUCUS NOT REPORTED 10/25/2020    TRICHOMONAS NOT REPORTED 10/25/2020    YEAST NOT REPORTED 10/25/2020    BACTERIA FEW 10/25/2020    SPECGRAV 1.009 10/25/2020    LEUKOCYTESUR LARGE 10/25/2020    UROBILINOGEN Normal 10/25/2020    BILIRUBINUR NEGATIVE 10/25/2020    GLUCOSEU NEGATIVE 10/25/2020    KETUA NEGATIVE 10/25/2020    AMORPHOUS NOT REPORTED 10/25/2020     Urine Sodium:     Lab Results   Component Value Date    GENNARO 85 10/25/2020     Urine Creatinine:     Lab Results   Component Value Date    LABCREA 24.3 10/25/2020     Radiology:     Reviewed. Assessment:     1. 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 2.  NSTEMI status post multivessel coronary disease, will require CABG. 3.  Diabetes type 2.  4.  History of  donor renal transplant done in  at [address] 5.  Essential hypertension. 6.  Decompensated heart failure. Plan:   1.   Continue current immunosuppressants, currently on tacrolimus 3 mg twice a day,  mg twice a day and prednisone 5 mg daily. 2.  Monitor strict I's and O's and renal function. 3.  CT surgery during work-up for possible CABG. 4.  BMP in AM.  5.  Will follow. Nutrition   Please ensure that patient is on a renal diet/TF. Avoid nephrotoxic drugs/contrast exposure. We will continue to follow along with you. Fish Fontanez MD  [facility]     This note is created with the assistance of a speech-recognition program. While intending to generate a document that actually reflects the content of the visit, no guarantees can be provided that every mistake has been identified and corrected by editing.

## 2020-10-28 NOTE — PROGRESS NOTES
Transported per cart to room 1012. Update given to RN. Right groin site remains clean and dry. Wife called and updated on room number and of confusion. Patient spoke to wife directly.   Patient remains confused but calm at this time

## 2020-10-28 NOTE — PROGRESS NOTES
Received perfect serve from RN that the patient refuses to wear his nonrebreather intermittently and desats down. Also he is refusing oral medications. Upon evaluation, patient's blood pressures on the higher side. Currently is wearing the nonrebreather and saturating well. Will order labetalol as needed at this point of time. Heart rate tolerates. Barbara Gustafson M.D.   Internal Medicine Resident , PGY-3  [address] [place] [address]  10/27/20 10:10 PM

## 2020-10-28 NOTE — CONSULTS
[facility] Cardiothoracic Surgery  CONSULTATION      CC: Caitlin CAD s/p cardiac cath    HPI:  Mr. Mariia Ramsay is a 66 y.o.  male who presents s/p cardiac cath with Dr Vincent Mario. He is currently admitted for acute on chronic respiratory failure, acute on chronic diastolic heart failure and metabolic encephalopathy. He is a poor historian, currently confused and has a safety sitter at bedside. Pertinent medical history includes CKD stage III s/p transplant 2012, diabetes mellitus type 2, COPD, bilateral BKA, A fib/flutter, DOTTY and obesity. Cardiac workup has revealed multivessel CAD. Patient denies chest pain. He has been referred for consideration of coronary revascularization. PMH:   has a past medical history of Blind one eye, CAD (coronary artery disease), Chronic kidney disease, Diabetes mellitus ([address] and Hypertension. PSH:   has a past surgical history that includes Leg amputation below knee (Right, 08/2018); hernia repair; tumor excision; Kidney transplant (N/A); Cardiac catheterization (10/27/2020); and Coronary angioplasty with stent. Allergies:     Allergies   Allergen Reactions    Contrast  [Iodides] Hives       Medications:  Current Facility-Administered Medications:     torsemide (DEMADEX) tablet 40 mg, 40 mg, Oral, Daily, Isabela Espinoza MD    sodium chloride flush 0.9 % injection 10 mL, 10 mL, Intravenous, 2 times per day, Ilana Soto MD    sodium chloride flush 0.9 % injection 10 mL, 10 mL, Intravenous, PRN, Ilana Soto MD    acetaminophen (TYLENOL) tablet 650 mg, 650 mg, Oral, Q4H PRN, Ilana Soto MD    aspirin chewable tablet 81 mg, 81 mg, Oral, Daily, Isabela Espinoza MD    heparin 25,000 units in dextrose 5% 250 mL infusion, 9.9 Units/kg/hr, Intravenous, Continuous, Isabela Espinoza MD, Last Rate: 12.1 mL/hr at 10/28/20 0548, 11.9 Units/kg/hr at 10/28/20 0548    amLODIPine (NORVASC) tablet 10 mg, 10 mg, Oral, Nightly, Ilana Soto MD    labetalol injection 4 mg, 4 mg, Intravenous, Q6H PRN, Rosibel Eller MD    heparin (porcine) injection 4,000 Units, 4,000 Units, Intravenous, PRN, Rosibel Eller MD    heparin (porcine) injection 2,000 Units, 2,000 Units, Intravenous, PRN, Rosibel Eller MD, 2,000 Units at 10/28/20 0547    metoprolol tartrate (LOPRESSOR) tablet 25 mg, 25 mg, Oral, BID, Isabela Espinoza MD, 25 mg at 10/27/20 0837    glucose (GLUTOSE) 40 % oral gel 15 g, 15 g, Oral, PRN, Rosibel Eller MD    dextrose 50 % IV solution, 12.5 g, Intravenous, PRN, Isabela Espinoza MD, 12.5 g at 10/25/20 1053    glucagon (rDNA) injection 1 mg, 1 mg, Intramuscular, PRN, Isabela Espinoza MD    dextrose 5 % solution, 100 mL/hr, Intravenous, PRN, Rosibel Eller MD    Social Hx:   reports that he has never smoked. He has never used smokeless tobacco.    Family Hx:  family history is not on file. ROS:    Review of Systems   Unable to perform ROS: Mental status change       Physical Examination    Vitals:  Vitals:    10/28/20 0630   BP: (!) 177/65   Pulse: 71   Resp:    Temp:    SpO2: 98%     Physical Exam  Vitals signs and nursing note reviewed. Constitutional:       General: He is not in acute distress. Appearance: He is obese. HENT:      Head: Normocephalic. Nose: Nose normal.      Mouth/Throat:      Mouth: Mucous membranes are moist.      Pharynx: Oropharynx is clear. Eyes:      Extraocular Movements: Extraocular movements intact. Conjunctiva/sclera: Conjunctivae normal.      Pupils: Pupils are equal, round, and reactive to light. Neck:      Musculoskeletal: Normal range of motion. Cardiovascular:      Rate and Rhythm: Rhythm irregular. Pulses: Normal pulses. Heart sounds: Normal heart sounds. No murmur. No friction rub. No gallop. Comments: Frequent ectopy  Pulmonary:      Effort: Pulmonary effort is normal. No respiratory distress. Breath sounds: Normal breath sounds.    Abdominal:      General: Bowel sounds are normal. Tenderness: There is no abdominal tenderness. Musculoskeletal:      Comments: Bilateral BKA   Skin:     General: Skin is warm and dry. Neurological:      Mental Status: He is alert. He is disoriented. Psychiatric:      Comments: Confused. Poor judgement at times         Labs:   CBC:   Recent Labs     10/26/20  0311 10/27/20  0609 10/28/20  0418   WBC 4.7 3.7 4.4   HGB 8.3* 8.6* 9.2*   HCT 29.2* 29.4* 33.6*   .4 102.4 104.0*   * 156 144     BMP:  Recent Labs     10/25/20  2240 10/26/20  0311 10/27/20  0904 10/28/20  0418    143 143 146*   K 4.0 3.5* 3.6* 4.4    107 105 105   CO2 27 28 31 27   BUN 57* 55* 43* 42*   CREATININE 1.50* 1.44* 1.29* 1.20   MG 2.3 2.3  --   --      Accucheck Glucoses:  Recent Labs     10/26/20  1222 10/26/20  1605 10/26/20  2048 10/27/20  0655 10/27/20  1206   POCGLU 120* 182* 229* 139* 135*     Cardiac Enzymes: No results for input(s): CKTOTAL, CKMB, CKMBINDEX, TROPONINI in the last 72 hours. PT/INR: No results for input(s): PROTIME, INR in the last 72 hours.   APTT:   Recent Labs     10/26/20  2320 10/27/20  0609 10/28/20  0418   APTT 59.4* 57.2* 35.5*     Liver Profile:  Lab Results   Component Value Date    AST 14 10/24/2020    ALT 22 10/24/2020    BILIDIR 0.1 05/11/2017    BILITOT 0.80 10/24/2020    ALKPHOS 109 10/24/2020     Lab Results   Component Value Date    CHOL 166 05/11/2017    HDL 43 05/11/2017    TRIG 132 05/11/2017     TSH: No results found for: TSH  UA:   Lab Results   Component Value Date    COLORU YELLOW 10/25/2020    PHUR 6.5 10/25/2020    WBCUA TOO NUMEROUS TO COUNT 10/25/2020    RBCUA TOO NUMEROUS TO COUNT 10/25/2020    MUCUS NOT REPORTED 10/25/2020    TRICHOMONAS NOT REPORTED 10/25/2020    YEAST NOT REPORTED 10/25/2020    BACTERIA FEW 10/25/2020    SPECGRAV 1.009 10/25/2020    LEUKOCYTESUR LARGE 10/25/2020    UROBILINOGEN Normal 10/25/2020    BILIRUBINUR NEGATIVE 10/25/2020    GLUCOSEU NEGATIVE 10/25/2020    AMORPHOUS NOT REPORTED 10/25/2020         Testing:  Cardiac cath:   Multi-vessel Coronary Artery Disease. Long lesions, heavily calcified   LV was not done. Recommendations      CTS consult for CABG. Medical therapy as needed. Risk factor modification. Signature      ----------------------------------------------------------------   Electronically signed by Antione Vann(Performing Physician) on   10/27/2020 17:33   ----------------------------------------------------------------      Angiographic Findings      Cardiac Arteries and Lesion Findings     LMCA: Diffuse irregularities 20-30%.     LAD: Diffuse irregularities 30-40% and Single stenosis. Lesion on Mid LAD: 80% stenosis 50 mm length. Lesion plaque is ruptured. Comments:heavily calcified. LCx: Multiple stenosis. Lesion on Prox CX: 90% stenosis. Lesion on 1st Ob Yue: Mid subsection. 75% stenosis. Lesion on 2nd Ob Yue: Mid subsection. 80% stenosis. RCA: Single stenosis. with patent distal stent       Lesion on Mid RCA: 75% stenosis.       Coronary Tree      Dominance: Right       Echo: Pending    CXR:  ONE XRAY VIEW OF THE CHEST         10/24/2020 1:30 pm         COMPARISON:    None.         HISTORY:    ORDERING SYSTEM PROVIDED HISTORY: sob    TECHNOLOGIST PROVIDED HISTORY:    sob    Reason for Exam: increased shortness of breath today    Acuity: Acute    Type of Exam: Initial    Additional signs and symptoms: NA    Relevant Medical/Surgical History: hypertension         FINDINGS:    Cardiac silhouette is enlarged.  Mediastinal contours normal.  Diffuse    perihilar and infrahilar predominant bilateral pulmonary ground-glass opacity    with small layering pleural effusions.  No pneumothorax is seen.  No acute    osseous abnormality is evident.              Impression    Cardiomegaly with hydrostatic edema and small layering pleural effusions in    keeping with acute congestive heart failure.             CT scan: Ordered    Imaging Studies:  I have personally reviewed the testing/imaging, and agree with the findings listed above. In summary, Mr. Connor Ojeda is a 66y.o. year-old male with multivessel CAD. Problem List:    Multivessel CAD s/p cardiac catheterization   Acute on chronic diastolic CHF   CKD stage III s/p kidney transplant 2012   Diabetes mellitus type 2, uncontrolled   COPD   DOTTY   S/p bilateral BKA   Hypertension   Hyperlipidemia   Sacral decubitus ulcer   Obesity, BMI 30.10    Recommendation:  I believe Mr. Connor Ojeda would benefit optimally from CABG. Surgical candidacy to be determine once pre-operative testing has been completed    Agree with the above  Patient confused multiple medical problems  Vital signs stable  Heart regular rate and rhythm  Lungs rhonchorous  Abdomen obese  Sacral decubitus ulcer  Extremities bilateral BKA    Plan for possible stenting versus CABG  Patient high risk for CABG  Poor rehab potential  We will discuss with cardiology and surgeons  Do appreciate the consult from cardiology.         Solis Long, ALFREDO - CNP

## 2020-10-28 NOTE — PROGRESS NOTES
[facility]  Progress Note                   Date:   10/28/2020  Patient name: Johnny Davis  Date of admission:  10/24/2020  4:14 PM  MRN:   [id number]  YOB: 1945  PCP: Paulo Knutson MD    Reason for Admission: Shortness of breath [R06.02]  Shortness of breath [R06.02]    Subjective:       Clinical Changes /Abnormalities: Pt. Seen & examined in room. No CP/SOB. Post cardiac cath yesterday. On heparin gtt. Medications:   Scheduled Meds:   torsemide  40 mg Oral Daily    sodium chloride flush  10 mL Intravenous 2 times per day    aspirin  81 mg Oral Daily    amLODIPine  10 mg Oral Nightly    tacrolimus  3 mg Oral BID    predniSONE  5 mg Oral Daily    mycophenolate  540 mg Oral BID    insulin lispro  0-6 Units Subcutaneous TID WC    insulin lispro  0-3 Units Subcutaneous Nightly    insulin glargine  10 Units Subcutaneous Nightly    cefTRIAXone (ROCEPHIN) IV  1 g Intravenous Q24H    atorvastatin  40 mg Oral Nightly    tamsulosin  0.4 mg Oral Daily    sennosides-docusate sodium  1 tablet Oral Daily    sodium chloride flush  10 mL Intravenous 2 times per day    metoprolol tartrate  25 mg Oral BID     Continuous Infusions:   heparin (PORCINE) Infusion 11.9 Units/kg/hr (10/28/20 0548)    dextrose       CBC:   Recent Labs     10/26/20  0311 10/27/20  0609 10/28/20  0418   WBC 4.7 3.7 4.4   HGB 8.3* 8.6* 9.2*   * 156 144     BMP:    Recent Labs     10/26/20  0311 10/27/20  0904 10/28/20  0418    143 146*   K 3.5* 3.6* 4.4    105 105   CO2 28 31 27   BUN 55* 43* 42*   CREATININE 1.44* 1.29* 1.20   GLUCOSE 113* 147* 206*     Hepatic:  No results for input(s): AST, ALT, ALB, BILITOT, ALKPHOS in the last 72 hours. Troponin:   Recent Labs     10/25/20  0843 10/25/20  1149   TROPHS 209* 199*     BNP: No results for input(s): BNP in the last 72 hours. Lipids: No results for input(s): CHOL, HDL in the last 72 hours.     Invalid input(s): LDLCALCU  INR:   No results for input(s): INR in the last 72 hours. Objective:   Vitals: BP (!) 174/91   Pulse 67   Temp 98.2 °F (36.8 °C) (Oral)   Resp 20   Ht 6' 1\" (1.854 m)   Wt 228 lb 2.8 oz (103.5 kg)   SpO2 99%   BMI 30.10 kg/m²   General appearance: alert and cooperative with exam  HEENT: Head: Normocephalic, no lesions, without obvious abnormality. Neck:no JVD, trachea midline, no adenopathy  Lungs: Diminished to auscultation throughout. No distress. Heart: Irregular rate and rhythm, s1/s2 auscultated, no murmurs. Afib   Abdomen: soft, non-tender, bowel sounds active  Extremities: no edema, no groin hematoma  Neurologic: not done    Echo 10/10/20  Result Narrative     Left Ventricle: Systolic function is hyperdynamic with an ejection   fraction over 70%.   Aortic Valve: There is no regurgitation. There is mild stenosis. The   calculated aortic valve area is 1.80 cm2. The calculated aortic valve peak   gradient is 17.00 mmHg. The calculated aortic valve mean gradient is 12.0   mmHg.   Tricuspid Valve: RVSP calculated at 64 mmHg. RVSP is based on RA   pressure of 8 mmHg.   Left Ventricle: There is severe increased wall thickness/hypertrophy. Echo 10/26/20    Summary  Left ventricle is normal in size. Global left ventricular systolic function  is normal.  Calculated ejection fraction 58% by Carrion's method. Normal mitral valve structure and function. Mild mitral regurgitation. Normal tricuspid valve structure. Trivial tricuspid regurgitation. Cath 10/27/20:  LMCA: Diffuse irregularities 20-30%. LAD: Diffuse irregularities 30-40% and Single stenosis. Lesion on Mid LAD: 80% stenosis 50 mm length. Lesion plaque is ruptured. Comments:heavily calcified. LCx: Multiple stenosis. Lesion on Prox CX: 90% stenosis. Lesion on 1st Ob Yue: Mid subsection. 75% stenosis. Lesion on 2nd Ob Yue: Mid subsection. 80% stenosis. RCA: Single stenosis. with patent distal stent    Lesion on Mid RCA: 75% stenosis. Assessment / Acute Cardiac Problems:   1. MV CAD on cath 10/27/20  2. Acute on chronic diastolic heart failure  3. Preserved EF  4. NSTEMI  5. Atrial Fibrillation/Atrial Flutter on eliquis at home  6. T2Dm  7. HTN  8. PVD s/p MICHEAL BKA  9. Renal Failure s/p transplant      Plan of Treatment:   1. CTS work up on going for CABG. 2. Continue heparin gtt. Currently asymptomatic. 3. Resume amlodipine 10 mg. Start coreg 6.25 mg bid (switched from lopressor). 4. Continue asa 81 mg, Lipitor 40 mg  5. Nephrology following for volume management given CKD/renal transplant. -4.7 L since admit. Electronically signed by Tricia Saxena MD on 10/28/2020 at [facility].  [phone]    Attending Cardiologist Addendum: I have reviewed and performed the history, physical, subjective, objective, assessment, and plan with the resident/fellow and agree with the note. I performed the history and physical personally. I have made changes to the note above as needed. Thank you for allowing me to participate in the care of this patient, please do not hesitate to call if you have any questions. [place] DO Angela, [address], [address] [facility]  [facility]. [facility]  [phone]

## 2020-10-28 NOTE — PROGRESS NOTES
[facility]  Internal Medicine Teaching Residency Program  Inpatient Daily Progress Note  ______________________________________________________________________________    Patient: Abigail Eaton  YOB: 1945   YWX:[id number]    Acct: [de-identified]     Room: [address] [place] [address]  Admit date: 10/24/2020  Today's date: 10/28/20  Number of days in the hospital: 4    SUBJECTIVE   Admitting Diagnosis: Acute on chronic respiratory failure with hypoxia and hypercapnia (HCC)  CC: Shortness of breath  Pt examined at bedside. Chart & results reviewed. Overnight patient was agitated and confused. This morning pt is doing well. Alert and oriented. Denies chest pain, difficulty breathing, fever, chills, abdominal pain, nausea and vomiting. Vitally he is stable and afebrile   BP: 174/91, HR: 67, RR: 20  On 4L of O2: 99%      Output: 1425  Cardiac cath: show multivessel disease, CTS Consulted. Review of Systems   Constitutional: Negative for chills and fever. HENT: Negative. Eyes: Negative. Respiratory: Negative for cough. Cardiovascular: Negative for chest pain. Gastrointestinal: Negative for abdominal pain, diarrhea, nausea and vomiting. Endocrine: Negative. Genitourinary: Negative for difficulty urinating and hematuria. Musculoskeletal: Positive for back pain. Hematological: Negative. Psychiatric/Behavioral: Positive for decreased concentration. Negative for behavioral problems, confusion and sleep disturbance. The patient is not nervous/anxious.         BRIEF HISTORY   49-year-old -American male with past medical history of renal failure s/p renal transplant (2012), congestive heart failure with preserved ejection fraction with greater than 70%, COPD on 2 L home oxygen, pulmonary hypertension with calculated RVSP 64 mmHg, diabetes mellitus type 2, hypertension, chronic immune suppression on Myfortic and tacrolimus, bilateral below-knee are normal.  No involuntary movements are noted. Skin:  Warm and dry. Good color, turgor and pigmentation. No lesions or scars.   No cyanosis or clubbing  Neurological/Psychiatric: The patient's general behavior, level of consciousness, thought content and emotional status is normal.        Medications:  Scheduled Medications:    torsemide  40 mg Oral Daily    sodium chloride flush  10 mL Intravenous 2 times per day    aspirin  81 mg Oral Daily    amLODIPine  10 mg Oral Nightly    acetylcysteine  600 mg Oral BID    tacrolimus  3 mg Oral BID    predniSONE  5 mg Oral Daily    mycophenolate  540 mg Oral BID    insulin lispro  0-6 Units Subcutaneous TID WC    insulin lispro  0-3 Units Subcutaneous Nightly    insulin glargine  10 Units Subcutaneous Nightly    cefTRIAXone (ROCEPHIN) IV  1 g Intravenous Q24H    atorvastatin  40 mg Oral Nightly    tamsulosin  0.4 mg Oral Daily    sennosides-docusate sodium  1 tablet Oral Daily    sodium chloride flush  10 mL Intravenous 2 times per day    metoprolol tartrate  25 mg Oral BID     Continuous Infusions:    heparin (PORCINE) Infusion 11.9 Units/kg/hr (10/28/20 0548)    dextrose       PRN Medicationssodium chloride flush, 10 mL, PRN  acetaminophen, 650 mg, Q4H PRN  labetalol, 20 mg, Q2H PRN  sodium chloride flush, 10 mL, PRN  acetaminophen, 650 mg, Q6H PRN    Or  acetaminophen, 650 mg, Q6H PRN  polyethylene glycol, 17 g, Daily PRN  promethazine, 12.5 mg, Q6H PRN    Or  ondansetron, 4 mg, Q6H PRN  heparin (porcine), 4,000 Units, PRN  heparin (porcine), 2,000 Units, PRN  glucose, 15 g, PRN  dextrose, 12.5 g, PRN  glucagon (rDNA), 1 mg, PRN  dextrose, 100 mL/hr, PRN    Diagnostic Labs:  CBC:   Recent Labs     10/26/20  0311 10/27/20  0609 10/28/20  0418   WBC 4.7 3.7 4.4   RBC 2.88* 2.87* 3.23*   HGB 8.3* 8.6* 9.2*   HCT 29.2* 29.4* 33.6*   .4 102.4 104.0*   RDW 18.5* 19.5* 18.3*   * 156 144     BMP:   Recent Labs     10/26/20  0311 10/27/20  0906 injury on top of CKD stage III- Improved- Continue to monitor BMP. Nephrology following. 10. Hypokalemia. 3.5, replace potassium. 11. Hyperlipidemia-stable, started on Lipitor 40 mg daily  12. A. Fib: On rate control with coreg.  on heparin drip. 13. COPD: Stable will resume home meds on discharge  14. S/p renal transplant: On immunosuppressive agent.  Tacrolimus and mycophenolate sodium. Nephrology on board.   15. Obstructive sleep apnea- Bipap. 16. Anemia of chronic disease: Due to CRF-will monitor hemoglobin  17. DVT Prophylaxis : on heparin. Gonzalo Watts MD  Internal Medicine Resident, PGY-1  [address], [place]  10/28/2020, 7:52 AM

## 2020-10-28 NOTE — CARE COORDINATION
Transitional planning-called [facility] and left message regarding Hospital Bed. Called wife Alex Scott and asked about her plans when he is discharged. Plan to take him home with [facility] for nursing and [employer] for aides-doesn't know company.  Luis Mooney.

## 2020-10-28 NOTE — PROGRESS NOTES
Report called to [facility] [place] and attempts made to assist patient to eat. Declines but taking fluids well. Right groin remains clean soft and dry with staff / Lurlean Nares at bedside for close monitoring.

## 2020-10-28 NOTE — PROGRESS NOTES
Pt agitated and confused. Trying to get out of bed and remove oxygen mask. BP increased to 170/91 and on call medicine resident paged. Pt placed in restraints. Resident came to bedside and orders to be placed by resident. Will continue to monitor.

## 2020-10-28 NOTE — PROGRESS NOTES
Physical Therapy  DATE: 10/28/2020    NAME: Ana Paula Vines  MRN: [id number]   : 1945    Patient not seen this date for Physical Therapy due to:  [] Blood transfusion in progress  [] Hemodialysis  []  Patient Declined  [] Spine Precautions   [] Strict Bedrest  [] Surgery/ Procedure  [] Testing      [x] Other  -  Pt being discharged to [facility]. Will check back on pt if time allows. [] PT being discontinued at this time. Patient independent. No further needs. [] PT being discontinued at this time as the patient has been transferred to palliative care. No further needs.     Sriram Lee, VALENTINA

## 2020-10-29 NOTE — PROGRESS NOTES
APTT ordered for 0230. Writer call phlebotomist and explained need for lab d/t heparin gtt, was told they were drawing on COVID floor and would be down when done. Will continue to monitor. @0430 lab up to draw blood, unable to get anything. Lyndsay Vega they will have another phlebotomist come to try. Continuing to monitor.

## 2020-10-29 NOTE — PROGRESS NOTES
[facility]  Internal Medicine Teaching Residency Program  Inpatient Daily Progress Note  ______________________________________________________________________________    Patient: Anna Sherman  YOB: 1945   BNA:[id number]    Acct: [de-identified]     Room: 2019/2019-01  Admit date: 10/24/2020  Today's date: 10/29/20  Number of days in the hospital: 5    SUBJECTIVE   Admitting Diagnosis: Acute on chronic respiratory failure with hypoxia and hypercapnia (HCC)  CC: Shortness of breath  Pt examined at bedside. Chart & results reviewed. No acute events overnight, pt is alert and oriented in time place, person. Sitter at the bedside. Denies chest pain, abdominal pain, difficulty urination, fever, chills, nausea and vomiting     Vitally he is afebrile   BP: 122/56, HR: 69, RR: 15  On 4L of oxygen with sat: 96%    Urine output decreased: 325ml/24hr  Creat: 1.29> 1.20> 1.53      Review of Systems   Constitutional: Negative for chills and fever. HENT: Negative. Eyes: Negative. Respiratory: Negative for cough. Cardiovascular: Negative for chest pain. Gastrointestinal: Negative for abdominal pain, diarrhea, nausea and vomiting. Endocrine: Negative. Genitourinary: Negative for difficulty urinating and hematuria. Musculoskeletal: Positive for back pain. Hematological: Negative. Psychiatric/Behavioral: Positive for decreased concentration. Negative for behavioral problems, confusion and sleep disturbance. The patient is not nervous/anxious.         BRIEF HISTORY   75-year-old -American male with past medical history of renal failure s/p renal transplant (2012), congestive heart failure with preserved ejection fraction with greater than 70%, COPD on 2 L home oxygen, pulmonary hypertension with calculated RVSP 64 mmHg, diabetes mellitus type 2, hypertension, chronic immune suppression on Myfortic and tacrolimus, bilateral below-knee amputation, AIDEN rojas on Eliquis, presented to the emergency department with shortness of breath.  He was transferred from other hospital due to nonavailability of beds.      Patient is a poor historian, confused.  Was recently discharge from [facility] after respiratory failure due to CHF and pneumonia where he was intubated.  Reports of increasing shortness of breath, chest pain, swelling of the body.  Denies fever, chills, abdominal pain, common cold. Patient wife states that her  reports back pain due to sacral decubitis    OBJECTIVE     Vital Signs:  BP (!) 137/52   Pulse 69   Temp 97.5 °F (36.4 °C) (Oral)   Resp 15   Ht 6' 1\" (1.854 m)   Wt 224 lb 3.2 oz (101.7 kg)   SpO2 96%   BMI 29.58 kg/m²     Temp (24hrs), Av °F (36.7 °C), Min:97.5 °F (36.4 °C), Max:98.3 °F (36.8 °C)    In: 910   Out: 325 [Urine:325]    Physical Exam:  Constitutional: This is a well developed, well nourished, 25-29.9 - Overweight 66y.o. year old male who is alert, oriented, cooperative and in no apparent distress. Head:normocephalic and atraumatic. EENT:  PERRLA. No conjunctival injections. Septum was midline, mucosa was without erythema, exudates or cobblestoning. No thrush was noted. Neck: Supple without thyromegaly. No elevated JVP. Trachea was midline. Respiratory: Chest was symmetrical without dullness to percussion. Breath sounds bilaterally were clear to auscultation. There were no wheezes, rhonchi or rales. There is no intercostal retraction or use of accessory muscles. No egophony noted. Cardiovascular: Regular without murmur, clicks, gallops or rubs. Abdomen: Slightly rounded and soft without organomegaly. No rebound, rigidity or guarding was appreciated. Lymphatic: No lymphadenopathy. Musculoskeletal: Normal curvature of the spine. No gross muscle weakness. Extremities:  No lower extremity edema, ulcerations, tenderness, varicosities or erythema.   Muscle size, tone and strength are normal.  No involuntary movements are noted. Skin:  Warm and dry. Good color, turgor and pigmentation. No lesions or scars.   No cyanosis or clubbing  Neurological/Psychiatric: The patient's general behavior, level of consciousness, thought content and emotional status is normal.        Medications:  Scheduled Medications:    meropenem  1 g Intravenous Q12H    insulin glargine  15 Units Subcutaneous Nightly    carvedilol  6.25 mg Oral BID WC    torsemide  40 mg Oral Daily    sodium chloride flush  10 mL Intravenous 2 times per day    aspirin  81 mg Oral Daily    amLODIPine  10 mg Oral Nightly    tacrolimus  3 mg Oral BID    predniSONE  5 mg Oral Daily    mycophenolate  540 mg Oral BID    insulin lispro  0-6 Units Subcutaneous TID WC    insulin lispro  0-3 Units Subcutaneous Nightly    atorvastatin  40 mg Oral Nightly    tamsulosin  0.4 mg Oral Daily    sennosides-docusate sodium  1 tablet Oral Daily    sodium chloride flush  10 mL Intravenous 2 times per day     Continuous Infusions:    heparin (PORCINE) Infusion 11.9 Units/kg/hr (10/29/20 0615)    dextrose       PRN Medicationssodium chloride flush, 10 mL, PRN  acetaminophen, 650 mg, Q4H PRN  labetalol, 20 mg, Q2H PRN  sodium chloride flush, 10 mL, PRN  acetaminophen, 650 mg, Q6H PRN    Or  acetaminophen, 650 mg, Q6H PRN  polyethylene glycol, 17 g, Daily PRN  promethazine, 12.5 mg, Q6H PRN    Or  ondansetron, 4 mg, Q6H PRN  heparin (porcine), 4,000 Units, PRN  heparin (porcine), 2,000 Units, PRN  glucose, 15 g, PRN  dextrose, 12.5 g, PRN  glucagon (rDNA), 1 mg, PRN  dextrose, 100 mL/hr, PRN    Diagnostic Labs:  CBC:   Recent Labs     10/27/20  0609 10/28/20  0418 10/29/20  0541   WBC 3.7 4.4 4.8   RBC 2.87* 3.23* 2.85*   HGB 8.6* 9.2* 8.2*   HCT 29.4* 33.6* 27.9*   .4 104.0* 97.9   RDW 19.5* 18.3* 18.0*    144 130*     BMP:   Recent Labs     10/27/20  0904 10/28/20  0418 10/29/20  0541    146* 139   K 3.6* 4.4 3.9    105 103   CO2 31 27 31   BUN 43* 42* 48*   CREATININE 1.29* 1.20 1.53*     PT/INR:   No results for input(s): PROTIME, INR in the last 72 hours. APTT:   Recent Labs     10/28/20  1008 10/28/20  1858 10/29/20  0541   APTT 48.1* 60.8* 72.8*     FASTING LIPID PANEL:  Lab Results   Component Value Date    CHOL 166 05/11/2017    HDL 43 05/11/2017    TRIG 132 05/11/2017     LIVER PROFILE:   No results for input(s): AST, ALT, ALB, BILIDIR, BILITOT, ALKPHOS in the last 72 hours. MICROBIOLOGY:   Lab Results   Component Value Date/Time    CULTURE (A) 10/25/2020 06:14 PM     KLEBSIELLA PNEUMONIAE <[id number] CFU/ML THIS ORGANISM IS AN EXTENDED-SPECTRUM BETA-LACTAMASE  AND RESISTANCE TO THERAPY WITH PENICILLINS, CEPHALOSPORINS AND AZTREONAM IS EXPECTED. THESE ORGANISMS GENERALLY REMAIN SUSCEPTIBLE TO CARBAPENEMS. CONSIDER ID CONSULTATION. Imaging:    Xr Chest Portable    Result Date: 10/24/2020  Cardiomegaly with hydrostatic edema and small layering pleural effusions in keeping with acute congestive heart failure. ASSESSMENT & PLAN     ASSESSMENT / PLAN:     Principal Problem:    Acute on chronic respiratory failure with hypoxia and hypercapnia (HCC)  Plan:     1. Acute on chronic diastolic heart failure: Improving: On cardiac diet fluid restriction 1500, strict input output record, daily weight, heparin drip and torsemide 40mg daily. nephro and cardiology following. 2. Acute hypoxic and hypercapnia respiratory failure: Due to #1. improving. Continue diuresis, wean off oxygen. 3. Acute metabolic encephalopathy:Improving: multifactorial due to uti and co2 narcosis. 4. S/p Cardiac Cath- show multivessel disease. cardiothoracic consulted for CABG. On continuous heparin drip. 5. Essential hypertension: Controlled. continue amlodipine 10 mg and Coreg twice daily. 6. Elevated troponin: possibly due to underlying demand ischemia, or CYNTHIA or infection. Cardiology following, on heparin drip  7.  Urinary tract

## 2020-10-29 NOTE — CONSULTS
[facility]  Initial Consult Note  Date: 10/29/2020    Hospital day :5     Impression:   1. Acute on chronic hypercapnic respiratory failure  2. Decompensated diastolic heart failure  3. Non-ST elevation myocardial infarction status post cardiac catheterization showing multivessel coronary artery disease and will require CABG  4. 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 History of  donor renal transplant  on immunosuppressive therapy  6. Multidrug-resistant/ESBL Klebsiella pneumonia bacteriuria    Recommendations   · The patient does report some mild dysuria but the patient has less than 1000 colony-forming units of the Klebsiella pneumonia isolated in the urine which mostly represents a colonizer rather than pathogens. · The only concern here is that the patient is scheduled for coronary artery bypass grafting surgery and in light of that he will likely need indwelling Santos catheter and I would recommend antibiotic coverage  · I will start the patient on meropenem in anticipation of the coronary artery bypass grafting surgery  · I will follow his progress and adjust therapy accordingly    Chief complaint/reason for consultation:   ESBL Klebsiella urinary tract infection    History of Present Illness: Zev Murillo is a 66y.o.-year-old male who was initially admitted on 10/24/2020. Zev Murillo has a history of renal failure status post transplant  on immunosuppressive therapy, congestive heart failure, COPD on 2 L of home oxygen, pulmonary hypertension, diabetes mellitus type 2, hypertension, atrial fibrillation who was recently hospitalized at [facility] with pneumonia and CHF requiring intubation but was subsequently extubated and discharged to an extended care facility.     The patient was brought into the hospital due to shortness of breath and work-up showed an elevated troponin and EKG showed atrial flutter with 3-1 conduction. He was admitted with acute on chronic respiratory failure related to diastolic heart failure, but there was concern for toxic metabolic encephalopathy, questionable UTI versus infected sacral ulcer and he was started on broad-spectrum antimicrobial therapy. Patient was seen by the cardiology service and underwent a catheterization for acute coronary syndrome and was found to have multivessel coronary artery disease with some lung lesions that were heavily calcified and CT surgery was consulted for coronary artery bypass grafting    The urine culture did end up growing ESBL Klebsiella pneumonia and I been asked to evaluate and help with antibiotic choice    The patient at this point in time does have multiple complaints reporting being tired of lying in bed and is reporting pain in his lower back and gluteal area. He does not report any subjective fever or chills. He reports that his breathing is overall okay and his shortness of breath is improved. He does report some mild dysuria but no urinary frequency. I have personally reviewed the past medical history, past surgical history, medications, social history, and family history, and I have updated the database accordingly. Past Medical History:     Past Medical History:   Diagnosis Date    Blind one eye     Right    CAD (coronary artery disease)     Chronic kidney disease     Diabetes mellitus ([facility] [address]     Hypertension      Past Surgical  History:     Past Surgical History:   Procedure Laterality Date    CARDIAC CATHETERIZATION  10/27/2020    Multi-vessel Coronary Artery Disease. Long lesions, heavily calcified / RCA: Single stenosis. with patent distal stent  /   CT CONSULT    CORONARY ANGIOPLASTY WITH STENT PLACEMENT      RCA DISTAL    HERNIA REPAIR      KIDNEY TRANSPLANT N/A     pt thinks it was the right side    LEG AMPUTATION BELOW KNEE Right 08/2018    TUMOR EXCISION       Medications:      meropenem  1 g Intravenous Q12H    insulin glargine  15 Units Subcutaneous Nightly    carvedilol  6.25 mg Oral BID WC    torsemide  40 mg Oral Daily    sodium chloride flush  10 mL Intravenous 2 times per day    aspirin  81 mg Oral Daily    amLODIPine  10 mg Oral Nightly    tacrolimus  3 mg Oral BID    predniSONE  5 mg Oral Daily    mycophenolate  540 mg Oral BID    insulin lispro  0-6 Units Subcutaneous TID WC    insulin lispro  0-3 Units Subcutaneous Nightly    atorvastatin  40 mg Oral Nightly    tamsulosin  0.4 mg Oral Daily    sennosides-docusate sodium  1 tablet Oral Daily    sodium chloride flush  10 mL Intravenous 2 times per day     Social History:     Social History     Socioeconomic History    Marital status:      Spouse name: Not on file    Number of children: Not on file    Years of education: Not on file    Highest education level: Not on file   Occupational History    Not on file   Social Needs    Financial resource strain: Not on file    Food insecurity     Worry: Not on file     Inability: Not on file    Transportation needs     Medical: Not on file     Non-medical: Not on file   Tobacco Use    Smoking status: Never Smoker    Smokeless tobacco: Never Used   Substance and Sexual Activity    Alcohol use: No    Drug use: No    Sexual activity: Not on file   Lifestyle    Physical activity     Days per week: Not on file     Minutes per session: Not on file    Stress: Not on file   Relationships    Social connections     Talks on phone: Not on file     Gets together: Not on file     Attends Protestant service: Not on file     Active member of club or organization: Not on file     Attends meetings of clubs or organizations: Not on file     Relationship status: Not on file    Intimate partner violence     Fear of current or ex partner: Not on file     Emotionally abused: Not on file     Physically abused: Not on file     Forced sexual activity: Not on file   Other Topics Concern    Not on file   Social History Narrative    Not on file     Family History:   History reviewed. No pertinent family history. Allergies:   Contrast  [iodides]     Review of Systems:   General: No fevers or chills. Eyes: No double vision or blurry vision. ENT: No sore throat or runny nose. Cardiovascular: No chest pain or palpitations. Lung: He does have some mild shortness of breath but overall is improved. Significant cough  Abdomen: No nausea, vomiting, diarrhea, or abdominal pain. Genitourinary: No urinary frequency but does have some mild dysuria  Musculoskeletal: He reports some mild back pain and gluteal pain. Hematologic: No bleeding or bruising. Neurologic: No headache, weakness, numbness, or tingling. Physical Examination :   BP (!) 158/53   Pulse 69   Temp 98.6 °F (37 °C) (Oral)   Resp 13   Ht 6' 1\" (1.854 m)   Wt 224 lb 3.2 oz (101.7 kg)   SpO2 96%   BMI 29.58 kg/m²     Temperature Range: Temp: 98.6 °F (37 °C) Temp  Av °F (36.7 °C)  Min: 97.5 °F (36.4 °C)  Max: 98.6 °F (37 °C)  General Appearance: alert and oriented to person, place and time, well-developed and well-nourished, in no acute distress and he has a sitter in the room with him as at times he has been agitated but it is my understanding he is actually doing very well tonight. Head: Normocephalic, without obvious abnormality, atraumatic  Eyes: Pupils equal, round, reactive, to light and accommodation; extraocular movements intact; sclera anicteric; conjunctivae pink  ENT: Oropharynx clear, without erythema, exudate, or thrush. Neck: Supple, without lymphadenopathy. Pulmonary/Chest: Few scattered rales appreciated bilaterally  Cardiovascular: There is a soft systolic ejection murmur appreciated   Abdomen: soft, non-tender, non-distended, normal bowel sounds, no masses or organomegaly  Extremities: Status post bilateral below the knee amputations  Neurologic: No gross sensory or motor deficits.     Skin: He has a stage III sacral ulceration which is clean not currently infected    Medical Decision Making:   I have independently reviewed/ordered the following labs:  CBC with Differential:   Recent Labs     10/28/20  0418 10/29/20  0541   WBC 4.4 4.8   HGB 9.2* 8.2*   HCT 33.6* 27.9*    130*   LYMPHOPCT 5* 15*   MONOPCT 4 7     BMP:   Recent Labs     10/28/20  0418 10/29/20  0541   * 139   K 4.4 3.9    103   CO2 27 31   BUN 42* 48*   CREATININE 1.20 1.53*     Hepatic Function Panel: No results for input(s): PROT, LABALBU, BILIDIR, IBILI, BILITOT, ALKPHOS, ALT, AST in the last 72 hours. No results found for: PROCAL    No results found for: CRP  No results found for: FERRITIN  No results found for: FIBRINOGEN  No results found for: DDIMER  No results found for: LDH    No results found for: SEDRATE    Lab Results   Component Value Date    COVID19 Not Detected 10/24/2020     No results found for requested labs within last 30 days. Imaging Studies:   CT OF THE CHEST WITHOUT CONTRAST 10/29/2020 10:03 am FINDINGS:  Moderate right greater than left layering simple pleural effusions with associated atelectasis. As this occurs in the setting of mild pulmonary edema and dependent subcutaneous edema, this presumably relates to positive fluid balance. Findings suggestive of underlying pulmonary cystic disease with numerous randomly distributed thin walled cysts throughout the aerated portions of the lungs bilaterally. Correlate with prior clinical history of such. Cardiomegaly with coronary artery, aortic valvular, and papillary muscle calcifications and a small pericardial effusion. Cholelithiasis. 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 ONE XRAY VIEW OF THE CHEST 10/24/2020 1:30 pm   FINDINGS:   Cardiomegaly with hydrostatic edema and small layering pleural effusions in keeping with acute congestive heart failure.      Vl Dup Carotid Bilateral Result Date: 10/28/2020  Summary     The study demonstrates:   Right:  Internal carotid artery has a mild, <50% stenosis based on velocities. The vertebral artery is patent with antegrade flow. Left:  Internal carotid artery has a mild, <50% stenosis based on velocities. The vertebral artery is patent with antegrade flow. Electronically signed by Trudi Gonsalez(Interpreting  physician) on 10/28/2020 01:51 AM     Vl Vein Mapping Lower Bilateral Result Date: 10/28/2020  Findings are:   Right:  Limited study, in the visualized areas no superficial or deep vein  thrombosis was identified. Vein sizes are listed in the table below. Left:  Limited study, in the visualized areas no superficial or deep vein  thrombosis was identified. Vein sizes are listed in the table below. Electronically signed by Trudi Gonsalez(Interpreting  physician) on 10/28/2020 01:53 AM     Cultures:     Culture, Urine [[id number]]  (Abnormal)   Collected: 10/25/20 5364    Order Status: Completed  Specimen: Urine, straight catheter  Updated: 10/28/20 1410     Specimen Description  . URINE,STRAIGHT CATHETER     Special Requests  NOT REPORTED     Culture  KLEBSIELLA PNEUMONIAE <[id number] CFU/ML THIS ORGANISM IS AN EXTENDED-SPECTRUM BETA-LACTAMASE  AND RESISTANCE TO THERAPY WITH PENICILLINS, CEPHALOSPORINS AND AZTREONAM IS EXPECTED.  THESE ORGANISMS GENERALLY REMAIN SUSCEPTIBLE TO CARBAPENEMS.  CONSIDER ID CONSULTATION. Abnormal     Klebsiella pneumoniae (1)     Antibiotic  Interpretation  JASMEET  Status     amikacin    Final      NOT REPORTED    ampicillin  Resistant   Final      >=32   RESISTANT    ampicillin-sulbactam    Final      NOT REPORTED    aztreonam  Resistant   Final      32   RESISTANT    ceFAZolin  Resistant   Final      >=64   RESISTANT    ceFAZolin  Resistant  Cefazolin sensitivity results can be used to predict the effectiveness of oral cephalosporins (eg.  Cephalexin) in uncomplicated Urinary Tract Infections due to E. coli, K. pneumoniae, and P. mirabilis  Final cefepime  Resistant   Final      >=64   RESISTANT    cefTRIAXone  Resistant   Final      >=64   RESISTANT    ciprofloxacin  Resistant   Final      >=4   RESISTANT    ertapenem    Final      NOT REPORTED    Confirmatory Extended Spectrum Beta-Lactamase  Positive  POSITIVE  Final     gentamicin  Sensitive   Final      <=1   SUSCEPTIBLE    meropenem  Sensitive   Final      <=0.25   SUSCEPTIBLE    nitrofurantoin  Intermediate   Final      64   INTERMEDIATE    tigecycline    Final      NOT REPORTED    tobramycin  Intermediate   Final      8   INTERMEDIATE    trimethoprim-sulfamethoxazole  Sensitive   Final      <=20   SUSCEPTIBLE    piperacillin-tazobactam  Resistant   Final      16   RESISTANT            Thank you for allowing us to participate in the care of this patient. Please call with questions. Electronically signed by State Glen MD on 10/29/2020 at 7:58 PM      [facility]  [address] messaging  OFFICE: [phone]      This note is created with the assistance of a speech recognition program.  While intending to generate a document that actually reflects the content of the visit, the document can still have some errors including those of syntax and sound a like substitutions which may escape proof reading. In such instances, actual meaning can be extrapolated by contextual diversion.

## 2020-10-29 NOTE — PROGRESS NOTES
Physical Therapy  Facility/Department: [facility]  Daily Treatment Note  NAME: Robert Hernández  : 1945  MRN: [id number]    Date of Service: 10/29/2020    Discharge Recommendations:  Patient would benefit from continued therapy after discharge     Assessment   Body structures, Functions, Activity limitations: Decreased functional mobility ; Decreased strength;Decreased safe awareness;Decreased endurance;Decreased balance  Assessment: Patient tolerated treatment fair. Pt performed bed mobility with modAx2. Pt dangled EOB for 5 minutes with CGA. PT Education: Goals;PT Role;Plan of Care;Precautions;General Safety  REQUIRES PT FOLLOW UP: Yes  Activity Tolerance  Activity Tolerance: Patient limited by fatigue     Patient Diagnosis(es): The encounter diagnosis was COPD exacerbation ([facility] [address] has a past medical history of Blind one eye, CAD (coronary artery disease), Chronic kidney disease, Diabetes mellitus ([facility] [address] and Hypertension. has a past surgical history that includes Leg amputation below knee (Right, 2018); hernia repair; tumor excision; Kidney transplant (N/A); Cardiac catheterization (10/27/2020); and Coronary angioplasty with stent. Restrictions  Restrictions/Precautions  Restrictions/Precautions: General Precautions, Fall Risk, Up as Tolerated  Required Braces or Orthoses?: No  Position Activity Restriction  Other position/activity restrictions: BLE BKA  Subjective   General  Chart Reviewed: Yes  Response To Previous Treatment: Patient with no complaints from previous session. Family / Caregiver Present: No  Pain Screening  Patient Currently in Pain: No  Orientation  Orientation  Overall Orientation Status: Within Functional Limits  Objective   Bed mobility  Rolling to Left: Moderate assistance  Rolling to Right: Moderate assistance  Supine to Sit: Moderate assistance;2 Person assistance  Sit to Supine:  Moderate assistance;2 Person assistance  Balance  Posture: Fair  Sitting - Static: Poor;+(pt

## 2020-10-29 NOTE — PLAN OF CARE
Problem: Pain:  Goal: Pain level will decrease  Description: Pain level will decrease  10/29/2020 0011 by John Menendez RN  Outcome: Ongoing  10/28/2020 1217 by Leland Crum RN  Outcome: Ongoing  Goal: Control of acute pain  Description: Control of acute pain  10/29/2020 0011 by John Menendez RN  Outcome: Ongoing  10/28/2020 1217 by Leland Crum RN  Outcome: Ongoing  Goal: Control of chronic pain  Description: Control of chronic pain  10/29/2020 0011 by John Menendez RN  Outcome: Ongoing  10/28/2020 1217 by Leland Crum RN  Outcome: Ongoing     Problem: Skin Integrity:  Goal: Will show no infection signs and symptoms  Description: Will show no infection signs and symptoms  10/29/2020 0011 by John Menendez RN  Outcome: Ongoing  10/28/2020 1217 by Leland Crum RN  Outcome: Ongoing  Goal: Absence of new skin breakdown  Description: Absence of new skin breakdown  10/29/2020 0011 by John Menendez RN  Outcome: Ongoing  10/28/2020 1217 by Leland Crum RN  Outcome: Ongoing     Problem: Falls - Risk of:  Goal: Will remain free from falls  Description: Will remain free from falls  10/29/2020 0011 by John Menendez RN  Outcome: Ongoing  10/28/2020 1217 by Leland Curm RN  Outcome: Ongoing  Goal: Absence of physical injury  Description: Absence of physical injury  10/29/2020 0011 by John Menendez RN  Outcome: Ongoing  10/28/2020 1217 by Leland Crum RN  Outcome: Ongoing     Problem: Restraint Use - Nonviolent/Non-Self-Destructive Behavior:  Goal: Absence of restraint indications  Description: Absence of restraint indications  10/29/2020 0011 by John Menendez RN  Outcome: Ongoing  10/28/2020 1217 by Leland Crum RN  Outcome: Ongoing  Goal: Absence of restraint-related injury  Description: Absence of restraint-related injury  10/29/2020 0011 by John Menendez RN  Outcome: Ongoing  10/28/2020 1217 by Leland Crum RN  Outcome: Ongoing     Problem: Musculor/Skeletal Functional Status  Goal: Highest potential functional level  10/29/2020 0011 by Keerthi Pascal RN  Outcome: Ongoing  10/28/2020 1217 by Mary Ann Acevedo RN  Outcome: Ongoing     Problem: Cardiac:  Goal: Ability to maintain an adequate cardiac output will improve  Description: Ability to maintain an adequate cardiac output will improve  10/29/2020 0011 by Keerthi Pascal RN  Outcome: Ongoing  10/28/2020 1217 by Mary Ann Acevedo RN  Outcome: Ongoing  Goal: Hemodynamic stability will improve  Description: Hemodynamic stability will improve  10/29/2020 0011 by Keerthi Pascal RN  Outcome: Ongoing  10/28/2020 1217 by Mary Ann Acevedo RN  Outcome: Ongoing     Problem: Fluid Volume:  Goal: Ability to achieve and maintain adequate urine output will improve  Description: Ability to achieve and maintain adequate urine output will improve  10/29/2020 0011 by Keerthi Pascal RN  Outcome: Ongoing  10/28/2020 1217 by Mary Ann Acevedo RN  Outcome: Ongoing     Problem: Respiratory:  Goal: Respiratory status will improve  Description: Respiratory status will improve  10/29/2020 0011 by Keerthi Pascal RN  Outcome: Ongoing  10/28/2020 1217 by Mary Ann Acevedo RN  Outcome: Ongoing

## 2020-10-29 NOTE — PROGRESS NOTES
Pharmacy Note     Renal Dose Adjustment    Hellen Saint is a 66 y.o. male. Pharmacist assessment of renally cleared medications. Recent Labs     10/28/20  0418 10/29/20  0541   BUN 42* 48*       Recent Labs     10/28/20  0418 10/29/20  0541   CREATININE 1.20 1.53*       Estimated Creatinine Clearance: 51 mL/min (A) (based on SCr of 1.53 mg/dL (H)). Estimated CrCl using Ideal Body Weight: 50 mL/min (based on IBW 80 kg)    Height:   Ht Readings from Last 1 Encounters:   10/24/20 6' 1\" (1.854 m)     Weight:  Wt Readings from Last 1 Encounters:   10/29/20 224 lb 3.2 oz (101.7 kg)       The following medication dose has been adjusted based upon renal function per P&T Guidelines:    Merrem 1g IV q8h to q12h.

## 2020-10-29 NOTE — PROGRESS NOTES
[facility]   Progress Note                   Date:   10/29/2020  Patient name: Courtney Amanda  Date of admission:  10/24/2020  4:14 PM  MRN:   [id number]  YOB: 1945  PCP: Yaa Maynard MD    Reason for Admission: Shortness of breath [R06.02]  Shortness of breath [R06.02]    Subjective:       Clinical Changes / Abnormalities: Pt seen and examined in the room. Pt denies any CP or SOB. Sitter in room. Aflutter this am, rate controlled. Medications:   Scheduled Meds:   meropenem  1 g Intravenous Q12H    insulin glargine  15 Units Subcutaneous Nightly    carvedilol  6.25 mg Oral BID WC    torsemide  40 mg Oral Daily    sodium chloride flush  10 mL Intravenous 2 times per day    aspirin  81 mg Oral Daily    amLODIPine  10 mg Oral Nightly    tacrolimus  3 mg Oral BID    predniSONE  5 mg Oral Daily    mycophenolate  540 mg Oral BID    insulin lispro  0-6 Units Subcutaneous TID WC    insulin lispro  0-3 Units Subcutaneous Nightly    atorvastatin  40 mg Oral Nightly    tamsulosin  0.4 mg Oral Daily    sennosides-docusate sodium  1 tablet Oral Daily    sodium chloride flush  10 mL Intravenous 2 times per day     Continuous Infusions:   heparin (PORCINE) Infusion 11.9 Units/kg/hr (10/29/20 0615)    dextrose       CBC:   Recent Labs     10/27/20  0609 10/28/20  0418 10/29/20  0541   WBC 3.7 4.4 4.8   HGB 8.6* 9.2* 8.2*    144 130*     BMP:    Recent Labs     10/27/20  0904 10/28/20  0418 10/29/20  0541    146* 139   K 3.6* 4.4 3.9    105 103   CO2 31 27 31   BUN 43* 42* 48*   CREATININE 1.29* 1.20 1.53*   GLUCOSE 147* 206* 236*     Hepatic: No results for input(s): AST, ALT, ALB, BILITOT, ALKPHOS in the last 72 hours. Troponin: No results for input(s): TROPHS in the last 72 hours. BNP: No results for input(s): BNP in the last 72 hours. Lipids: No results for input(s): CHOL, HDL in the last 72 hours.     Invalid input(s): LDLCALCU  INR: No results

## 2020-10-29 NOTE — PROGRESS NOTES
Nephrology Progress Note      SUBJECTIVE       Pt was seen and examined. No acute issues overnite. Stable hemodynamics . Cardiology, cardiothoracic notes reviewed. Patient is status post cardiac catheterization, found to have multivessel coronary artery disease. He has history of chronic kidney disease with a baseline creatinine of 1.4-1.6, creatinine is 1.5 today. Is a little up from 1.2 from yesterday. Patient is on a stable dose of immunosuppression including tacrolimus, Myfortic and prednisone. Patient relates improvement in his breathing. Overall he is been negative by about 4 L or so since admission. OBJECTIVE      CURRENT TEMPERATURE:  Temp: 97.5 °F (36.4 °C)  MAXIMUM TEMPERATURE OVER 24HRS:  Temp (24hrs), Av °F (36.7 °C), Min:97.5 °F (36.4 °C), Max:98.3 °F (36.8 °C)    CURRENT RESPIRATORY RATE:  Resp: 15  CURRENT PULSE:  Pulse: 69  CURRENT BLOOD PRESSURE:  BP: (!) 137/52  24HR BLOOD PRESSURE RANGE:  Systolic (70NGF), CHANDANA:447 , Min:99 , WFR:790   ; Diastolic (25RLW), RZM:56, Min:30, Max:79    24HR INTAKE/OUTPUT:      Intake/Output Summary (Last 24 hours) at 10/29/2020 0934  Last data filed at 10/29/2020 0600  Gross per 24 hour   Intake 910 ml   Output 325 ml   Net 585 ml     WEIGHT :  Patient Vitals for the past 96 hrs (Last 3 readings):   Weight   10/29/20 0600 224 lb 3.2 oz (101.7 kg)   10/27/20 0541 228 lb 2.8 oz (103.5 kg)   10/26/20 0523 227 lb 15.3 oz (103.4 kg)     PHYSICAL EXAM      GENERAL APPEARANCE:Awake, alert, in no acute distress  SKIN: warm and dry, no rash or erythema  EYES: conjunctivae normal and sclera anicteric  ENT: no thrush no pharyngeal congestion   NECK:   No JVD. no carotid lymphadenopathy . PULMONARY: lungs are clear to auscultation. No Wheezing, no ronchi . CADRDIOVASCULAR: S1 and S2 normal NO S3 and NO S4 . No rubs , no murmur. ABDOMEN: soft nontender, bowel sounds present, no organomegaly, no ascites. EXTREMITIES: + Thigh edema.   Bilateral BKA    CURRENT MEDICATIONS      meropenem (MERREM) 1 g in sodium chloride 0.9 % 100 mL IVPB (mini-bag), Q12H  insulin glargine (LANTUS) injection vial 15 Units, Nightly  carvedilol (COREG) tablet 6.25 mg, BID WC  torsemide (DEMADEX) tablet 40 mg, Daily  sodium chloride flush 0.9 % injection 10 mL, 2 times per day  sodium chloride flush 0.9 % injection 10 mL, PRN  acetaminophen (TYLENOL) tablet 650 mg, Q4H PRN  aspirin chewable tablet 81 mg, Daily  heparin 25,000 units in dextrose 5% 250 mL infusion, Continuous  amLODIPine (NORVASC) tablet 10 mg, Nightly  labetalol (NORMODYNE;TRANDATE) injection 20 mg, Q2H PRN  tacrolimus (PROGRAF) capsule 3 mg, BID  predniSONE (DELTASONE) tablet 5 mg, Daily  mycophenolate (MYFORTIC) DR tablet 540 mg, BID  insulin lispro (HUMALOG) injection vial 0-6 Units, TID WC  insulin lispro (HUMALOG) injection vial 0-3 Units, Nightly  atorvastatin (LIPITOR) tablet 40 mg, Nightly  tamsulosin (FLOMAX) capsule 0.4 mg, Daily  sennosides-docusate sodium (SENOKOT-S) 8.6-50 MG tablet 1 tablet, Daily  sodium chloride flush 0.9 % injection 10 mL, 2 times per day  sodium chloride flush 0.9 % injection 10 mL, PRN  acetaminophen (TYLENOL) tablet 650 mg, Q6H PRN    Or  acetaminophen (TYLENOL) suppository 650 mg, Q6H PRN  polyethylene glycol (GLYCOLAX) packet 17 g, Daily PRN  promethazine (PHENERGAN) tablet 12.5 mg, Q6H PRN    Or  ondansetron (ZOFRAN) injection 4 mg, Q6H PRN  heparin (porcine) injection 4,000 Units, PRN  heparin (porcine) injection 2,000 Units, PRN  glucose (GLUTOSE) 40 % oral gel 15 g, PRN  dextrose 50 % IV solution, PRN  glucagon (rDNA) injection 1 mg, PRN  dextrose 5 % solution, PRN          LABS      CBC:   Recent Labs     10/27/20  0609 10/28/20  0418 10/29/20  0541   WBC 3.7 4.4 4.8   RBC 2.87* 3.23* 2.85*   HGB 8.6* 9.2* 8.2*   HCT 29.4* 33.6* 27.9*   .4 104.0* 97.9   MCH 30.0 28.5 28.8   MCHC 29.3 27.4* 29.4   RDW 19.5* 18.3* 18.0*    144 130*   MPV 13.2 12.3 12.2      BMP:   Recent Labs     10/27/20  0904 10/28/20  0418 10/29/20  0541    146* 139   K 3.6* 4.4 3.9    105 103   CO2 31 27 31   BUN 43* 42* 48*   CREATININE 1.29* 1.20 1.53*   GLUCOSE 147* 206* 236*   CALCIUM 9.5 10.0 9.6      URINE SODIUM:    Lab Results   Component Value Date    GENNARO 85 10/25/2020      URINE CREATININE:    Lab Results   Component Value Date    LABCREA 24.3 10/25/2020     URINALYSIS:  U/A:   Lab Results   Component Value Date    NITRU NEGATIVE 10/25/2020    COLORU YELLOW 10/25/2020    PHUR 6.5 10/25/2020    WBCUA TOO NUMEROUS TO COUNT 10/25/2020    RBCUA TOO NUMEROUS TO COUNT 10/25/2020    MUCUS NOT REPORTED 10/25/2020    TRICHOMONAS NOT REPORTED 10/25/2020    YEAST NOT REPORTED 10/25/2020    BACTERIA FEW 10/25/2020    SPECGRAV 1.009 10/25/2020    LEUKOCYTESUR LARGE 10/25/2020    UROBILINOGEN Normal 10/25/2020    BILIRUBINUR NEGATIVE 10/25/2020    GLUCOSEU NEGATIVE 10/25/2020    KETUA NEGATIVE 10/25/2020    AMORPHOUS NOT REPORTED 10/25/2020         ASSESSMENT      1. 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 Creatinine up from 1.2-1.5, will continue to follow closely. 2.  NSTEMI status post multivessel coronary disease, will require CABG. 3.  Diabetes type 2.  4.  History of  donor renal transplant done in  at [address] 5.  Essential hypertension. 6.  Decompensated heart failure    PLAN      1. Continue current immunosuppression. Agree with continuing Demadex for now   2. Cardiology and CT surgery colleagues following for possible CABG  3. Follow up labs ordered. 4. Following along       Please do not hesitate to call with questions.     This note is created with the assistance of a speech-recognition program. While intending to generate a document that actually reflects the content of the visit, no guarantees can be provided that every mistake has been identified and corrected by editing    Electronically signed by Mahesh Reyna MD on 10/29/2020 at 9:34 AM

## 2020-10-30 NOTE — PROGRESS NOTES
Comprehensive Nutrition Assessment    Type and Reason for Visit:  Initial(LOS Day 6)    Nutrition Recommendations/Plan:  -Recommend 5 CHO diabetic, cardiac diet w/ 1500 mL FR   -Suggest glucerna supplements BID   -Will monitor po intake, weights and wound healing     Nutrition Assessment:   Pt admitted d/t acute on chronic respiratory failure with hypoxia and hypercapnia. Pt stated that he had a very good appetite and was eating most of his meals - observed untouched b-fast tray near bedside. Pt stated UBW of 225 lbs and denies any recent wt loss. Pt noted w/ stage III sacral ulceration. Will add nutritional supplements to aide in wound healing progression. Will monitor. Malnutrition Assessment:  Malnutrition Status: At risk for malnutrition (Comment)    Context:  Acute Illness     Findings of the 6 clinical characteristics of malnutrition:  Energy Intake:  No significant decrease in energy intake  Weight Loss:  No significant weight loss     Body Fat Loss:  No significant body fat loss     Muscle Mass Loss:  No significant muscle mass loss    Fluid Accumulation:  1 - Mild Extremities   Strength:  Not Performed    Estimated Daily Nutrient Needs:  Energy (kcal):  1.3-1.4 ~> 5478-5178 kcals/d; Weight Used for Energy Requirements:  Admission     Protein (g):  1.5-1.7 gm/kg ~> 125-143 gms/d; Weight Used for Protein Requirements:  Ideal          Nutrition Related Findings:  glucose 191      Wounds:  Stage III, Pressure Injury       Current Nutrition Therapies:    DIET CARDIAC; Daily Fluid Restriction: 1500 ml    Anthropometric Measures:  · Height: 6' 1\" (185.4 cm)  · Current Body Weight: 225 lb (102.1 kg)   · Admission Body Weight: 231 lb (104.8 kg)    · Usual Body Weight: 225 lb (102.1 kg)(per pt)     · Ideal Body Weight: 184 lbs; % Ideal Body Weight 122.3 %   · BMI: 29.7  · BMI Categories: Overweight (BMI 25.0-29. 9)       Nutrition Diagnosis:   · Increased nutrient needs related to (wound healing) as

## 2020-10-30 NOTE — BRIEF OP NOTE
Brief Postoperative Note for Thoracentesis    Raissa Yoder  YOB: 1945  [id number]    Pre-operative Diagnosis: right Pleural effusion      Post-operative Diagnosis: Same    Procedure: Ultrasound guided Thoracentesis     Anesthesia: 1% Lidocaine     Surgeons/Assistants: Isabella Morris PA-C    Complications: none    EBL: Minimal    Specimens: were obtained    Ultrasound guided right thoracentesis performed. 1150 ml leticia fluid obtained. Dressing applied. Post procedure CXR ordered.     Electronically signed by LALO Padilla on 10/30/2020 at 10:39 AM

## 2020-10-30 NOTE — PLAN OF CARE
Nutrition Problem #1: Increased nutrient needs  Intervention: Food and/or Nutrient Delivery: Modify Current Diet, Start Oral Nutrition Supplement  Nutritional Goals: Pt to consume >75% of est'd daily needs via PO

## 2020-10-30 NOTE — PROGRESS NOTES
Renal Progress Note    Patient :  Kyle Alvarado; 66 y.o. MRN# [id number]  Location:  2019/2019-01  Attending:  Lesley Cervantes MD  Admit Date:  10/24/2020   Hospital Day: 6      Subjective:     Patient was seen and examined. No new issues reported overnight. Patient did have cardiac catheterization done on 10/27/2020 and was found to have multivessel coronary artery disease, CT surgery consulted for possible CABG. Getting preop work-up done. Status post right-sided thoracentesis done today about 1.1 L of leticia fluid was obtained. Serum creatinine did increase to 1.85 mg/DL today. Electrolytes okay. Urine output documented as 900 cc in the last 24 hours. He is currently -4.7 L since admission. Did have some hypotension with systolics as low as 07E yesterday. Currently on tacrolimus 3 mg twice a day, Myfortic 540 mg twice a day and prednisone 5 mg daily.     Outpatient Medications:     Medications Prior to Admission: Pollen Extracts (PROSTAT PO), Take by mouth  acetaminophen (TYLENOL) 325 MG tablet, Take 650 mg by mouth every 6 hours as needed for Pain  apixaban (ELIQUIS) 5 MG TABS tablet, Take by mouth 2 times daily  atorvastatin (LIPITOR) 40 MG tablet, Take 40 mg by mouth nightly  vitamin D (CHOLECALCIFEROL) 125 MCG (5000 UT) CAPS capsule, Take 5,000 Units by mouth daily  docusate sodium (COLACE) 100 MG capsule, Take 100 mg by mouth 3 times daily  magnesium oxide (MAG-OX) 400 MG tablet, Take 400 mg by mouth daily  mycophenolate (MYFORTIC) 180 MG DR tablet, Take 540 mg by mouth 2 times daily  ondansetron (ZOFRAN) 4 MG tablet, Take 4 mg by mouth every 8 hours as needed for Nausea or Vomiting  POTASSIUM CHLORIDE ER PO, Take 20 mEq by mouth daily  b complex vitamins capsule, Take 1 capsule by mouth daily  tacrolimus (PROGRAF) 1 MG capsule, Take 3 mg by mouth 2 times daily  aspirin (ASPIRIN 81) 81 MG EC tablet, Take by mouth daily   furosemide (LASIX) 40 MG tablet, Take 40 mg by mouth 2 times daily  ertapenem (INVANZ) 1 GM injection, Inject 1,000 mg into the muscle every 24 hours  dorzolamide (TRUSOPT) 2 % ophthalmic solution, Place 1 drop into the right eye 2 times daily   albuterol (PROVENTIL) (2.5 MG/3ML) 0.083% nebulizer solution, Take 2.5 mg by nebulization every 4 hours as needed for Wheezing   polyethylene glycol (GLYCOLAX) 17 g packet, Take 17 g by mouth daily   metoprolol tartrate (LOPRESSOR) 25 MG tablet, Take 25 mg by mouth 2 times daily  sennosides-docusate sodium (SENOKOT-S) 8.6-50 MG tablet, Take 2 tablets by mouth daily   predniSONE (DELTASONE) 5 MG tablet, Take 5 mg by mouth daily  tamsulosin (FLOMAX) 0.4 MG capsule, Take 0.4 mg by mouth nightly   [DISCONTINUED] apixaban (ELIQUIS) 2.5 MG TABS tablet, Take by mouth 2 times daily  amLODIPine (NORVASC) 5 MG tablet, Take 10 mg by mouth daily   insulin glargine (BASAGLAR KWIKPEN) 100 UNIT/ML injection pen, Inject 30 Units into the skin daily   bisacodyl (DULCOLAX) 10 MG suppository, Place 10 mg rectally daily  bumetanide (BUMEX) 1 MG tablet, Take 2 mg by mouth 2 times daily  colchicine (COLCRYS) 0.6 MG tablet, Take 0.6 mg by mouth daily  gabapentin (NEURONTIN) 300 MG capsule, Take 300 mg by mouth 3 times daily. .  insulin lispro (HUMALOG) 100 UNIT/ML injection vial, Inject into the skin 3 times daily (before meals)  [DISCONTINUED] dorzolamide-timolol (COSOPT) 22.3-6.8 MG/ML ophthalmic solution,   [DISCONTINUED] acetaminophen (TYLENOL) 500 MG tablet, Take 500 mg by mouth every 6 hours as needed for Pain  [DISCONTINUED] aspirin 81 MG tablet, Take 81 mg by mouth daily  [DISCONTINUED] atorvastatin (LIPITOR) 20 MG tablet, Take 40 mg by mouth daily   [DISCONTINUED] insulin lispro (HUMALOG) 100 UNIT/ML injection vial, Inject 15 Units into the skin 3 times daily (before meals)     Current Medications:     Scheduled Meds:    LORazepam  0.5 mg Intravenous Once    meropenem  1 g Intravenous Q8H    insulin glargine  15 Units Subcutaneous Nightly    carvedilol  6.25 mg Oral BID     [Held by provider] torsemide  40 mg Oral Daily    sodium chloride flush  10 mL Intravenous 2 times per day    aspirin  81 mg Oral Daily    amLODIPine  10 mg Oral Nightly    tacrolimus  3 mg Oral BID    predniSONE  5 mg Oral Daily    mycophenolate  540 mg Oral BID    insulin lispro  0-6 Units Subcutaneous TID WC    insulin lispro  0-3 Units Subcutaneous Nightly    atorvastatin  40 mg Oral Nightly    tamsulosin  0.4 mg Oral Daily    sennosides-docusate sodium  1 tablet Oral Daily    sodium chloride flush  10 mL Intravenous 2 times per day     Continuous Infusions:    heparin (PORCINE) Infusion 11.9 Units/kg/hr (10/30/20 0414)    dextrose       PRN Meds:  sodium chloride flush, acetaminophen, labetalol, sodium chloride flush, acetaminophen **OR** acetaminophen, polyethylene glycol, promethazine **OR** ondansetron, heparin (porcine), heparin (porcine), glucose, dextrose, glucagon (rDNA), dextrose    Input/Output:       I/O last 3 completed shifts: In: 309 [I.V.:209; IV Piggyback:100]  Out: 900 [Urine:900]. Patient Vitals for the past 96 hrs (Last 3 readings):   Weight   10/30/20 0600 225 lb 6.4 oz (102.2 kg)   10/29/20 0600 224 lb 3.2 oz (101.7 kg)   10/27/20 0541 228 lb 2.8 oz (103.5 kg)       Vital Signs:   Temperature:  Temp: 97.9 °F (36.6 °C)  TMax:   Temp (24hrs), Av.2 °F (36.8 °C), Min:97.9 °F (36.6 °C), Max:98.6 °F (37 °C)    Respirations:  Resp: 15  Pulse:   Pulse: 70  BP:    BP: (!) 101/35  BP Range: Systolic (96RCI), CZC:157 , Min:97 , QLF:658       Diastolic (21RUA), YCH:52, Min:35, Max:118      Physical Examination:     General:  AAO x 3, speaking in full sentences, no accessory muscle use. HEENT: Atraumatic, normocephalic, no throat congestion, moist mucosa. Eyes:   Pupils equal, round and reactive to light, EOMI. Neck:   Supple  Chest:   Bilateral vesicular breath sounds, no rales or wheezes.   Cardiac:  S1 S2 RR, no murmurs, gallops or rubs.  Abdomen: Soft, non-tender, no masses or organomegaly, BS audible. :   No suprapubic or flank tenderness. Neuro:  AAO x 3, No FND. SKIN:  No rashes, good skin turgor. Extremities:  No edema. Bilateral lower extremity amputations. Labs:       Recent Labs     10/28/20  0418 10/29/20  0541 10/30/20  0245   WBC 4.4 4.8 5.0   RBC 3.23* 2.85* 2.96*   HGB 9.2* 8.2* 8.5*   HCT 33.6* 27.9* 29.9*   .0* 97.9 101.0   MCH 28.5 28.8 28.7   MCHC 27.4* 29.4 28.4   RDW 18.3* 18.0* 17.8*    130* 100*   MPV 12.3 12.2 12.7      BMP:   Recent Labs     10/28/20  0418 10/29/20  0541 10/30/20  0052 10/30/20  0245   * 139  --  138   K 4.4 3.9 4.2 3.8    103  --  102   CO2 27 31  --  28   BUN 42* 48*  --  46*   CREATININE 1.20 1.53*  --  1.85*   GLUCOSE 206* 236*  --  191*   CALCIUM 10.0 9.6  --  9.1      Magnesium:    Recent Labs     10/30/20  0052   MG 2.1     SPEP:  Lab Results   Component Value Date    PROT 7.2 10/24/2020    PROT 7.2 05/11/2017     Urinalysis/Chemistries:      Lab Results   Component Value Date    NITRU NEGATIVE 10/25/2020    COLORU YELLOW 10/25/2020    PHUR 6.5 10/25/2020    WBCUA TOO NUMEROUS TO COUNT 10/25/2020    RBCUA TOO NUMEROUS TO COUNT 10/25/2020    MUCUS NOT REPORTED 10/25/2020    TRICHOMONAS NOT REPORTED 10/25/2020    YEAST NOT REPORTED 10/25/2020    BACTERIA FEW 10/25/2020    SPECGRAV 1.009 10/25/2020    LEUKOCYTESUR LARGE 10/25/2020    UROBILINOGEN Normal 10/25/2020    BILIRUBINUR NEGATIVE 10/25/2020    GLUCOSEU NEGATIVE 10/25/2020    KETUA NEGATIVE 10/25/2020    AMORPHOUS NOT REPORTED 10/25/2020     Urine Sodium:     Lab Results   Component Value Date    GENNARO 85 10/25/2020     Urine Creatinine:     Lab Results   Component Value Date    LABCREA 24.3 10/25/2020     Radiology:     Reviewed. Assessment:     1. Acute kidney injury likely secondary to hypotension and could have been related of overdiuresis. Patient was on Demadex which is currently on hold.   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 2.  NSTEMI status post multivessel coronary disease, will require CABG. 3.  Diabetes type 2.  4.  History of  donor renal transplant done in  at [address] 5.  Essential hypertension. 6.  Decompensated heart failure. Plan:   1. Continue current immunosuppressants, currently on tacrolimus 3 mg twice a day,  mg twice a day and prednisone 5 mg daily. 2.  Monitor strict I's and O's and renal function. 3.  Continue to hold torsemide. 4.  Will give IV fluids 50 cc an hour normal saline for 10 hours. 6.  CT surgery completing preop work-up for possible CABG. 7.  BMP in AM.  8.  Will follow. Nutrition   Please ensure that patient is on a renal diet/TF. Avoid nephrotoxic drugs/contrast exposure. We will continue to follow along with you. Fish Llamas MD  [facility]     This note is created with the assistance of a speech-recognition program. While intending to generate a document that actually reflects the content of the visit, no guarantees can be provided that every mistake has been identified and corrected by editing.

## 2020-10-30 NOTE — PROGRESS NOTES
Heparin gtt documented to be running at 9.9 units/kg/hr, 10.1 ml/hr, however heparin gtt actually running at 11.9 units/kg/hr, 12.1 ml/hr. Writer documented actual running rate in MAR. Will continue to monitor. @2020 PTT 46.3. Writer increased rate to 13.9units/kg/hr, 14.1 ml/hr and gave half re-blous of 2000 units. Next PTT scheduled for 0230. Continuing to monitor patient.

## 2020-10-30 NOTE — PROGRESS NOTES
[facility]  Internal Medicine Teaching Residency Program  Inpatient Daily Progress Note  ______________________________________________________________________________    Patient: Dee Ramsey  YOB: 1945   EMJ:[id number]    Acct: [de-identified]     Room: 2019/2019-01  Admit date: 10/24/2020  Today's date: 10/30/20  Number of days in the hospital: 6    SUBJECTIVE   Admitting Diagnosis: Acute on chronic respiratory failure with hypoxia and hypercapnia (HCC)  CC: Shortness of breath  Pt examined at bedside. Chart & results reviewed. Vitally he is afebrile   BP: 133/67, HR: 64, RR: 22  On 4L of oxygen with sat: 96%      Creat: 1.29> 1.20> 1.53>1.85    Demadex held  Will restart per nephro if needed. Patient underwent thoracentesis per CT surgery    Review of Systems   Constitutional: Negative for chills and fever. HENT: Negative. Eyes: Negative. Respiratory: Negative for cough. Cardiovascular: Negative for chest pain. Gastrointestinal: Negative for abdominal pain, diarrhea, nausea and vomiting. Endocrine: Negative. Genitourinary: Negative for difficulty urinating and hematuria. Musculoskeletal: Negative for back pain. Hematological: Negative. Psychiatric/Behavioral: Negative for behavioral problems, confusion, decreased concentration and sleep disturbance. The patient is not nervous/anxious.         BRIEF HISTORY   58-year-old -American male with past medical history of renal failure s/p renal transplant (2012), congestive heart failure with preserved ejection fraction with greater than 70%, COPD on 2 L home oxygen, pulmonary hypertension with calculated RVSP 64 mmHg, diabetes mellitus type 2, hypertension, chronic immune suppression on Myfortic and tacrolimus, bilateral below-knee amputation, A. fib on Eliquis, presented to the emergency department with shortness of breath.  He was transferred from other hospital due to nonavailability of beds.      Patient is a poor historian, confused.  Was recently discharge from [facility] after respiratory failure due to CHF and pneumonia where he was intubated.  Reports of increasing shortness of breath, chest pain, swelling of the body.  Denies fever, chills, abdominal pain, common cold. Patient wife states that her  reports back pain due to sacral decubitis    OBJECTIVE     Vital Signs:  BP (!) 101/35   Pulse 70   Temp 97.9 °F (36.6 °C) (Oral)   Resp 15   Ht 6' 1\" (1.854 m)   Wt 225 lb 6.4 oz (102.2 kg)   SpO2 91%   BMI 29.74 kg/m²     Temp (24hrs), Av.2 °F (36.8 °C), Min:97.9 °F (36.6 °C), Max:98.6 °F (37 °C)    In: 309   Out: 900 [Urine:900]    Physical Exam:  Physical Exam  Constitutional:       Appearance: Normal appearance. HENT:      Head: Normocephalic and atraumatic. Nose: Nose normal.      Mouth/Throat:      Mouth: Mucous membranes are dry. Eyes:      Extraocular Movements: Extraocular movements intact. Pupils: Pupils are equal, round, and reactive to light. Cardiovascular:      Rate and Rhythm: Normal rate and regular rhythm. Pulses: Normal pulses. Heart sounds: Normal heart sounds. Pulmonary:      Effort: Pulmonary effort is normal.      Breath sounds: Normal breath sounds. No wheezing or rales. Abdominal:      General: Bowel sounds are normal.      Palpations: Abdomen is soft. Musculoskeletal: Normal range of motion. Skin:     General: Skin is warm. Capillary Refill: Capillary refill takes less than 2 seconds. Neurological:      General: No focal deficit present. Mental Status: He is alert and oriented to person, place, and time.    Psychiatric:         Mood and Affect: Mood normal.       Medications:  Scheduled Medications:    LORazepam  0.5 mg Intravenous Once    meropenem  1 g Intravenous Q8H    insulin glargine  15 Units Subcutaneous Nightly    carvedilol  6.25 mg Oral BID WC    [Held by provider] torsemide  40 mg Oral Daily    sodium chloride flush  10 mL Intravenous 2 times per day    aspirin  81 mg Oral Daily    amLODIPine  10 mg Oral Nightly    tacrolimus  3 mg Oral BID    predniSONE  5 mg Oral Daily    mycophenolate  540 mg Oral BID    insulin lispro  0-6 Units Subcutaneous TID WC    insulin lispro  0-3 Units Subcutaneous Nightly    atorvastatin  40 mg Oral Nightly    tamsulosin  0.4 mg Oral Daily    sennosides-docusate sodium  1 tablet Oral Daily    sodium chloride flush  10 mL Intravenous 2 times per day     Continuous Infusions:    heparin (PORCINE) Infusion 11.9 Units/kg/hr (10/30/20 0414)    dextrose       PRN Medicationssodium chloride flush, 10 mL, PRN  acetaminophen, 650 mg, Q4H PRN  labetalol, 20 mg, Q2H PRN  sodium chloride flush, 10 mL, PRN  acetaminophen, 650 mg, Q6H PRN    Or  acetaminophen, 650 mg, Q6H PRN  polyethylene glycol, 17 g, Daily PRN  promethazine, 12.5 mg, Q6H PRN    Or  ondansetron, 4 mg, Q6H PRN  heparin (porcine), 4,000 Units, PRN  heparin (porcine), 2,000 Units, PRN  glucose, 15 g, PRN  dextrose, 12.5 g, PRN  glucagon (rDNA), 1 mg, PRN  dextrose, 100 mL/hr, PRN    Diagnostic Labs:  CBC:   Recent Labs     10/28/20  0418 10/29/20  0541 10/30/20  0245   WBC 4.4 4.8 5.0   RBC 3.23* 2.85* 2.96*   HGB 9.2* 8.2* 8.5*   HCT 33.6* 27.9* 29.9*   .0* 97.9 101.0   RDW 18.3* 18.0* 17.8*    130* 100*     BMP:   Recent Labs     10/28/20  0418 10/29/20  0541 10/30/20  0052 10/30/20  0245   * 139  --  138   K 4.4 3.9 4.2 3.8    103  --  102   CO2 27 31  --  28   BUN 42* 48*  --  46*   CREATININE 1.20 1.53*  --  1.85*     PT/INR:   No results for input(s): PROTIME, INR in the last 72 hours.   APTT:   Recent Labs     10/29/20  1206 10/29/20  1932 10/30/20  0243   APTT 54.3* 46.3* 77.4*     FASTING LIPID PANEL:  Lab Results   Component Value Date    CHOL 166 05/11/2017    HDL 43 05/11/2017    TRIG 132 05/11/2017     LIVER PROFILE:   No results for input(s): AST, ALT, ALB, BILIDIR, BILITOT, ALKPHOS in the last 72 hours. MICROBIOLOGY:   Lab Results   Component Value Date/Time    CULTURE PENDING 10/30/2020 10:52 AM       Imaging:    Xr Chest Portable    Result Date: 10/24/2020  Cardiomegaly with hydrostatic edema and small layering pleural effusions in keeping with acute congestive heart failure. ASSESSMENT & PLAN     ASSESSMENT / PLAN:     Principal Problem:    Acute on chronic respiratory failure with hypoxia and hypercapnia (HCC)  Plan:     1. Acute on chronic diastolic heart failure: Improving: On cardiac diet fluid restriction 1500, strict input output record, daily weight, heparin drip and torsemide 40mg daily. nephro and cardiology following. Hold demadex for now  2. Acute hypoxic and hypercapnia respiratory failure: Due to #1. improving. Hold diuresis, wean off oxygen. 3. Acute metabolic encephalopathy:improving: multifactorial due to uti and resp failure  4. S/p Cardiac Cath- show multivessel disease. cardiothoracic planning for CABG. On continuous heparin drip. 5. Essential hypertension:controlled. continue amlodipine 10 mg and Coreg twice daily. 6. Elevated troponin: possibly due to underlying demand ischemia, or CYNTHIA or infection. Cardiology following, on heparin drip  7. Urinary tract infection: due to klebsiella pneumonia. continue meropenem. ID on board  8. Diabetes mellitus Type II: SuS0T-6.6 (2/14/20) on high-dose insulin corrective algorithm and lantus 20 units nightly. On hypoglycemic protocol. 9. Acute kidney injury on top of CKD stage III- Improved- Continue to monitor BMP. Nephrology following. 10. Hypokalemia. 3.5, replace potassium. 11. Hyperlipidemia-stable, started on Lipitor 40 mg daily  12. A. Fib: On rate control with coreg.  on heparin drip. 13. COPD: Stable will resume home meds on discharge  14.  S/p renal transplant: On immunosuppressive agent.  Tacrolimus and mycophenolate sodium. Nephrology on board. hold diuretics for today.  15. Obstructive sleep apnea- Bipap. 16. Anemia of chronic disease: Due to CRF-will monitor hemoglobin  17. DVT Prophylaxis : on heparin drip. Jemima Hallman MD  [address], [place]  10/30/2020, 2:09 PM

## 2020-10-30 NOTE — PROGRESS NOTES
[facility]   Progress Note                   Date:   10/30/2020  Patient name: Bryan Irene  Date of admission:  10/24/2020  4:14 PM  MRN:   [id number]  YOB: 1945  PCP: Shruthi Valdez MD    Reason for Admission: Shortness of breath [R06.02]  Shortness of breath [R06.02]    Subjective:       Clinical Changes / Abnormalities: Pt seen and examined in the room. Pt denies any CP or SOB. S/p thoracentesis for right pleural effusion this am.  1150 ml leticia fluid obtained. Aflutter this am, rate controlled. I/O -[phone] yesterday and -[phone] since admission. Medications:   Scheduled Meds:   LORazepam  0.5 mg Intravenous Once    meropenem  1 g Intravenous Q8H    insulin glargine  15 Units Subcutaneous Nightly    carvedilol  6.25 mg Oral BID WC    [Held by provider] torsemide  40 mg Oral Daily    sodium chloride flush  10 mL Intravenous 2 times per day    aspirin  81 mg Oral Daily    amLODIPine  10 mg Oral Nightly    tacrolimus  3 mg Oral BID    predniSONE  5 mg Oral Daily    mycophenolate  540 mg Oral BID    insulin lispro  0-6 Units Subcutaneous TID WC    insulin lispro  0-3 Units Subcutaneous Nightly    atorvastatin  40 mg Oral Nightly    tamsulosin  0.4 mg Oral Daily    sennosides-docusate sodium  1 tablet Oral Daily    sodium chloride flush  10 mL Intravenous 2 times per day     Continuous Infusions:   heparin (PORCINE) Infusion 11.9 Units/kg/hr (10/30/20 0414)    dextrose       CBC:   Recent Labs     10/28/20  0418 10/29/20  0541 10/30/20  0245   WBC 4.4 4.8 5.0   HGB 9.2* 8.2* 8.5*    130* 100*     BMP:    Recent Labs     10/28/20  0418 10/29/20  0541 10/30/20  0052 10/30/20  0245   * 139  --  138   K 4.4 3.9 4.2 3.8    103  --  102   CO2 27 31  --  28   BUN 42* 48*  --  46*   CREATININE 1.20 1.53*  --  1.85*   GLUCOSE 206* 236*  --  191*     Hepatic: No results for input(s): AST, ALT, ALB, BILITOT, ALKPHOS in the last 72 hours.   Troponin: No bilaterally  Heart: irregular rate and rhythm, s1/s2 auscultated, no murmurs, flutter   Abdomen: soft, non-tender, bowel sounds active  Extremities: no edema, bilateral BKA   Neurologic: not done        Assessment / Acute Cardiac Problems:   1. MV CAD on cath 10/27/20  2. Acute on chronic diastolic heart failure  3. Preserved EF  4. NSTEMI  5. Atrial Fibrillation/Atrial Flutter on eliquis at home  6. T2Dm  7. HTN  8. PVD s/p MICHEAL BKA  9. Renal Failure s/p transplant       Patient Active Problem List:     Shortness of breath     Acute respiratory acidosis     Acute on chronic respiratory failure with hypoxia and hypercapnia (HCC)     Acute on chronic diastolic (congestive) heart failure (HCC)     Hypernatremia     CYNTHIA (acute kidney injury) ([facility] [address]     Normocytic anemia     Atrial flutter with controlled response (HCC)     Anemia of chronic renal failure     History of renal transplant     Hyperlipidemia     Nonproliferative diabetic retinopathy (HCC)     Obstructive sleep apnea syndrome     Stage 3 chronic kidney disease     Uncontrolled type 2 diabetes mellitus (HCC)     S/P bilateral BKA (below knee amputation) ([facility] [address]     Acute cystitis with hematuria     Sacral decubitus ulcer     Hypokalemia     Persistent proteinuria     NSTEMI (non-ST elevated myocardial infarction) ([facility] [address]     Multiple vessel coronary artery disease      Plan of Treatment:   1. CAD- MVD per Cardiac cath. Await CTS preop testing and final recs. Continue CCB, ASA, statin, BB. Heparin gtt held for IR procedure this am.    2.   CHF- Improving. Appreciate nephrology assistance. 3. HTN- Stable. Continue current medications   4. Right pleural effusion s/p thoracentesis today per CT. 4.   Afluttter. Rate controlled. Continue Heparin and BB. On Eliquis at home.       Electronically signed by ALFREDO Horta NP on 10/30/2020 at [address]  [phone]

## 2020-10-31 NOTE — PROGRESS NOTES
ALT, ALB, BILITOT, ALKPHOS in the last 72 hours. Troponin: No results for input(s): TROPHS in the last 72 hours. BNP: No results for input(s): BNP in the last 72 hours. Lipids: No results for input(s): CHOL, HDL in the last 72 hours. Invalid input(s): LDLCALCU  INR: No results for input(s): INR in the last 72 hours. Echo 10/10/20  Result Narrative     Left Ventricle: Systolic function is hyperdynamic with an ejection   fraction over 70%.   Aortic Valve: There is no regurgitation. There is mild stenosis. The   calculated aortic valve area is 1.80 cm2. The calculated aortic valve peak   gradient is 17.00 mmHg. The calculated aortic valve mean gradient is 12.0   mmHg.   Tricuspid Valve: RVSP calculated at 64 mmHg. RVSP is based on RA   pressure of 8 mmHg.   Left Ventricle: There is severe increased wall thickness/hypertrophy.      Echo 10/26/20    Summary  Left ventricle is normal in size. Global left ventricular systolic function  is normal.  Calculated ejection fraction 58% by Carrion's method. Normal mitral valve structure and function. Mild mitral regurgitation. Normal tricuspid valve structure. Trivial tricuspid regurgitation.     Cath 10/27/20:  LMCA: Diffuse irregularities 20-30%. LAD: Diffuse irregularities 30-40% and Single stenosis.    Lesion on Mid LAD: 80% stenosis 50 mm length. Lesion plaque is ruptured.    Comments:heavily calcified. LCx: Multiple stenosis.    Lesion on Prox CX: 90% stenosis.    Lesion on 1st Ob Yue: Mid subsection. 75% stenosis.    Lesion on 2nd Ob Yue: Mid subsection. 80% stenosis. RCA: Single stenosis. with patent distal stent    Lesion on Mid RCA: 75% stenosis.       Objective:   Vitals: BP (!) 113/52   Pulse 70   Temp 97.7 °F (36.5 °C) (Oral)   Resp 21   Ht 6' 1\" (1.854 m)   Wt 225 lb 6.4 oz (102.2 kg)   SpO2 94%   BMI 29.74 kg/m²   General appearance: alert and cooperative with exam  HEENT: Head: Normocephalic, no lesions, without obvious abnormality. Neck: no JVD, trachea midline, no adenopathy  Lungs: Rales bilaterally  Heart: irregular rate and rhythm, s1/s2 auscultated, no murmurs, flutter   Abdomen: soft, non-tender, bowel sounds active  Extremities: no edema, bilateral BKA   Neurologic: not done        Assessment / Acute Cardiac Problems:   1. MV CAD on cath 10/27/20  2. Acute on chronic diastolic heart failure  3. Preserved EF  4. NSTEMI  5. Atrial Fibrillation/Atrial Flutter on eliquis at home  6. T2Dm  7. HTN  8. PVD s/p MICHEAL BKA  9. Renal Failure s/p transplant       Patient Active Problem List:     Shortness of breath     Acute respiratory acidosis     Acute on chronic respiratory failure with hypoxia and hypercapnia (HCC)     Acute on chronic diastolic (congestive) heart failure (HCC)     Hypernatremia     CYNTHIA (acute kidney injury) ([facility] [address]     Normocytic anemia     Atrial flutter with controlled response (HCC)     Anemia of chronic renal failure     History of renal transplant     Hyperlipidemia     Nonproliferative diabetic retinopathy (HCC)     Obstructive sleep apnea syndrome     Stage 3 chronic kidney disease     Uncontrolled type 2 diabetes mellitus (HCC)     S/P bilateral BKA (below knee amputation) ([facility] [address]     Acute cystitis with hematuria     Sacral decubitus ulcer     Hypokalemia     Persistent proteinuria     NSTEMI (non-ST elevated myocardial infarction) ([facility] [address]     Multiple vessel coronary artery disease      Plan of Treatment:   1. CAD- MVD per Cardiac cath. Await CTS preop testing and final recs. Discussed with CTS NP yesterday and they are awaiting improvement from infection and will then proceed with CTS planning. 2.  Continue CCB, ASA, statin, BB. Heparin gtt     2. CHF- Improving. Appreciate nephrology assistance. 3. HTN- Stable. Continue current medications   4. Right pleural effusion s/p thoracentesis today per CT. 4.   Afluttter. Rate controlled. Continue Heparin and BB.   On Eliquis at home, will resume on discharge.       Electronically signed by ALFREDO Hernandez CNP on 10/31/2020 at 8:32 AM  [address]  [phone]

## 2020-10-31 NOTE — PROGRESS NOTES
[facility]  Internal Medicine Teaching Residency Program  Inpatient Daily Progress Note  ______________________________________________________________________________    Patient: Don De La Cruz  YOB: 1945   OIA:[id number]    Acct: [de-identified]     Room: 2019/2019-01  Admit date: 10/24/2020  Today's date: 10/31/20  Number of days in the hospital: 7    SUBJECTIVE   Admitting Diagnosis: Acute on chronic respiratory failure with hypoxia and hypercapnia (HCC)  CC: Shortness of breath  Pt examined at bedside. Chart & results reviewed. No acute overnights. Denies chest pain, abdominal pain, nausea, vomiting, fever, chills, diarrhea and constipation.      Vitally and hemodynamically stable   BP: 113/52, HR: 70     Torsemide on hold due to increase creat   1.20>1.53>1.85> 1.78    Urine culture   ROS:  Constitutional:  negative for chills, fevers, sweats  Respiratory:  negative for cough, dyspnea on exertion, hemoptysis, shortness of breath, wheezing  Cardiovascular:  negative for chest pain, chest pressure/discomfort, lower extremity edema, palpitations  Gastrointestinal:  negative for abdominal pain, constipation, diarrhea, nausea, vomiting  Neurological:  negative for dizziness, headache  BRIEF HISTORY     78-year-old -American male with past medical history of renal failure s/p renal transplant (2012), congestive heart failure with preserved ejection fraction with greater than 70%, COPD on 2 L home oxygen, pulmonary hypertension with calculated RVSP 64 mmHg, diabetes mellitus type 2, hypertension, chronic immune suppression on Myfortic and tacrolimus, bilateral below-knee amputation, A. fib on Eliquis, presented to the emergency department with shortness of breath.  He was transferred from other hospital due to nonavailability of beds.      Was recently discharge from [facility] after respiratory failure due to CHF and pneumonia where he was intubated.  Reports of increasing shortness of breath, chest pain, swelling of the body.  Denies fever, chills, abdominal pain, common cold. Patient wife states that her  reports back pain due to sacral decubitis    OBJECTIVE     Vital Signs:  BP (!) 101/47   Pulse 67   Temp 98.5 °F (36.9 °C) (Oral)   Resp 8   Ht 6' 1\" (1.854 m)   Wt 225 lb 6.4 oz (102.2 kg)   SpO2 97%   BMI 29.74 kg/m²     Temp (24hrs), Av.8 °F (36.6 °C), Min:97.1 °F (36.2 °C), Max:98.5 °F (36.9 °C)    In: 1055   Out: 625 [Urine:625]    Physical Exam:  Constitutional: This is a well developed, well nourished, 25-29.9 - Overweight 66y.o. year old male who is alert, oriented, cooperative and in no apparent distress. Head:normocephalic and atraumatic. EENT:  PERRLA. No conjunctival injections. Septum was midline, mucosa was without erythema, exudates or cobblestoning. No thrush was noted. Neck: Supple without thyromegaly. No elevated JVP. Trachea was midline. Respiratory: Chest was symmetrical without dullness to percussion. Breath sounds bilaterally were clear to auscultation. There were no wheezes, rhonchi or rales. There is no intercostal retraction or use of accessory muscles. No egophony noted. Cardiovascular: Regular without murmur, clicks, gallops or rubs. Abdomen: Slightly rounded and soft without organomegaly. No rebound, rigidity or guarding was appreciated. Lymphatic: No lymphadenopathy. Musculoskeletal: Normal curvature of the spine. No gross muscle weakness. Extremities:  No lower extremity edema, ulcerations, tenderness, varicosities or erythema. Muscle size, tone and strength are normal.  No involuntary movements are noted. Skin:  Warm and dry. Good color, turgor and pigmentation. No lesions or scars.   No cyanosis or clubbing  Neurological/Psychiatric: The patient's general behavior, level of consciousness, thought content and emotional status is normal.        Medications:  Scheduled Medications:    LORazepam  0.5 mg Intravenous Once    meropenem  1 g Intravenous Q8H    metoprolol  2.5 mg Intravenous Once    insulin glargine  15 Units Subcutaneous Nightly    carvedilol  6.25 mg Oral BID WC    [Held by provider] torsemide  40 mg Oral Daily    sodium chloride flush  10 mL Intravenous 2 times per day    aspirin  81 mg Oral Daily    amLODIPine  10 mg Oral Nightly    tacrolimus  3 mg Oral BID    predniSONE  5 mg Oral Daily    mycophenolate  540 mg Oral BID    insulin lispro  0-6 Units Subcutaneous TID     insulin lispro  0-3 Units Subcutaneous Nightly    atorvastatin  40 mg Oral Nightly    tamsulosin  0.4 mg Oral Daily    sennosides-docusate sodium  1 tablet Oral Daily    sodium chloride flush  10 mL Intravenous 2 times per day     Continuous Infusions:    sodium chloride Stopped (10/31/20 0327)    heparin (PORCINE) Infusion 12 Units/kg/hr (10/31/20 0142)    dextrose       PRN Medicationssodium chloride flush, 10 mL, PRN  acetaminophen, 650 mg, Q4H PRN  labetalol, 20 mg, Q2H PRN  sodium chloride flush, 10 mL, PRN  acetaminophen, 650 mg, Q6H PRN    Or  acetaminophen, 650 mg, Q6H PRN  polyethylene glycol, 17 g, Daily PRN  promethazine, 12.5 mg, Q6H PRN    Or  ondansetron, 4 mg, Q6H PRN  heparin (porcine), 4,000 Units, PRN  heparin (porcine), 2,000 Units, PRN  glucose, 15 g, PRN  dextrose, 12.5 g, PRN  glucagon (rDNA), 1 mg, PRN  dextrose, 100 mL/hr, PRN        Diagnostic Labs:  CBC:   Recent Labs     10/29/20  0541 10/30/20  0245   WBC 4.8 5.0   RBC 2.85* 2.96*   HGB 8.2* 8.5*   HCT 27.9* 29.9*   MCV 97.9 101.0   RDW 18.0* 17.8*   * 100*     BMP:   Recent Labs     10/29/20  0541 10/30/20  0052 10/30/20  0245     --  138   K 3.9 4.2 3.8     --  102   CO2 31  --  28   BUN 48*  --  46*   CREATININE 1.53*  --  1.85*     BNP: No results for input(s): BNP in the last 72 hours. PT/INR: No results for input(s): PROTIME, INR in the last 72 hours.   APTT:   Recent Labs 10/29/20  1932 10/30/20  0243 10/30/20  2256   APTT 46.3* 77.4* 42.9*     CARDIAC ENZYMES: No results for input(s): CKMB, CKMBINDEX, TROPONINI in the last 72 hours. Invalid input(s): CKTOTAL;3  FASTING LIPID PANEL:  Lab Results   Component Value Date    CHOL 166 05/11/2017    HDL 43 05/11/2017    TRIG 132 05/11/2017     LIVER PROFILE: No results for input(s): AST, ALT, ALB, BILIDIR, BILITOT, ALKPHOS in the last 72 hours. MICROBIOLOGY:   Lab Results   Component Value Date/Time    CULTURE NO GROWTH 17 HOURS 10/30/2020 10:52 AM       Imaging:    Ct Chest Wo Contrast    Result Date: 10/29/2020  Moderate right greater than left layering simple pleural effusions with associated atelectasis. As this occurs in the setting of mild pulmonary edema and dependent subcutaneous edema, this presumably relates to positive fluid balance. Findings suggestive of underlying pulmonary cystic disease with numerous randomly distributed thin walled cysts throughout the aerated portions of the lungs bilaterally. Correlate with prior clinical history of such. Cardiomegaly with coronary artery, aortic valvular, and papillary muscle calcifications and a small pericardial effusion. Cholelithiasis. 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 Xr Chest Portable    Result Date: 10/30/2020  No significant pneumothorax status post right thoracentesis. Mild right clavicle enlargement with cortical thickening and a coarsened trabecular pattern likely due to Paget's disease; although probably less likely, metastatic disease would be in the differential diagnosis. Bone scan is recommended for follow-up. The findings were sent to the Radiology Results [address] at 12:37 pm on 10/30/2020to be communicated to a licensed caregiver. Xr Chest Portable    Result Date: 10/24/2020  Cardiomegaly with hydrostatic edema and small layering pleural effusions in keeping with acute congestive heart failure. Us Thoracentesis    Result Date: 10/30/2020  Successful ultrasound guided right thoracentesis. ASSESSMENT & PLAN     ASSESSMENT / PLAN:     Principal Problem:    Acute on chronic respiratory failure with hypoxia and hypercapnia (HCC)  Principal Problem:    Acute on chronic respiratory failure with hypoxia and hypercapnia ([facility])  Active Problems:    Shortness of breath    Acute respiratory acidosis    Acute on chronic diastolic (congestive) heart failure ([facility])    Hypernatremia    CYNTHIA (acute kidney injury) ([facility] [address]    Normocytic anemia    Atrial flutter with controlled response ([facility])    Anemia of chronic renal failure    History of renal transplant    Hyperlipidemia    Nonproliferative diabetic retinopathy ([facility])    Obstructive sleep apnea syndrome    Stage 3 chronic kidney disease    Uncontrolled type 2 diabetes mellitus ([facility])    S/P bilateral BKA (below knee amputation) ([facility] [address]    Acute cystitis with hematuria    Sacral decubitus ulcer    Hypokalemia    Persistent proteinuria    NSTEMI (non-ST elevated myocardial infarction) ([facility] [address]    Multiple vessel coronary artery disease  Resolved Problems:    * No resolved hospital problems. *      Plan:   1. Acute on chronic diastolic heart failure: Improving: On cardiac diet fluid restriction 1500, strict input output record, daily weight, heparin drip.  nephro and cardiology following. Hold torsemide. 2. Acute hypoxic and hypercapnia respiratory failure: Due to #1. improving. Hold diuresis, wean off oxygen. 3. Pleural effusion s/p right side thoracentesis of 1.1l of leticia fluid. Culture show no growth. 4. Acute metabolic encephalopathy:improving: multifactorial due to uti and resp failure  5. S/p Cardiac Cath- show multivessel disease. cardiothoracic planning for CABG. On continuous heparin drip. 6. Essential hypertension:controlled. continue amlodipine 10 mg and Coreg twice daily. 7. Elevated troponin: possibly due to underlying demand ischemia, or CYNTHIA or infection.

## 2020-10-31 NOTE — PLAN OF CARE
Problem: Pain:  Goal: Pain level will decrease  Description: Pain level will decrease  Outcome: Ongoing  Goal: Control of acute pain  Description: Control of acute pain  Outcome: Ongoing  Goal: Control of chronic pain  Description: Control of chronic pain  Outcome: Ongoing     Problem: Skin Integrity:  Goal: Will show no infection signs and symptoms  Description: Will show no infection signs and symptoms  Outcome: Ongoing  Goal: Absence of new skin breakdown  Description: Absence of new skin breakdown  Outcome: Ongoing     Problem: Falls - Risk of:  Goal: Will remain free from falls  Description: Will remain free from falls  Outcome: Ongoing  Goal: Absence of physical injury  Description: Absence of physical injury  Outcome: Ongoing     Problem: Restraint Use - Nonviolent/Non-Self-Destructive Behavior:  Goal: Absence of restraint indications  Description: Absence of restraint indications  Outcome: Ongoing  Goal: Absence of restraint-related injury  Description: Absence of restraint-related injury  Outcome: Ongoing     Problem: Musculor/Skeletal Functional Status  Goal: Highest potential functional level  Outcome: Ongoing     Problem: Cardiac:  Goal: Ability to maintain an adequate cardiac output will improve  Description: Ability to maintain an adequate cardiac output will improve  Outcome: Ongoing  Goal: Hemodynamic stability will improve  Description: Hemodynamic stability will improve  Outcome: Ongoing     Problem: Fluid Volume:  Goal: Ability to achieve and maintain adequate urine output will improve  Description: Ability to achieve and maintain adequate urine output will improve  Outcome: Ongoing     Problem: Respiratory:  Goal: Respiratory status will improve  Description: Respiratory status will improve  Outcome: Ongoing     Problem: Nutrition  Goal: Optimal nutrition therapy  Description: Nutrition Problem #1: Increased nutrient needs  Intervention: Food and/or Nutrient Delivery: Modify Current Diet, Start Oral Nutrition Supplement  Nutritional Goals: Pt to consume >75% of est'd daily needs via PO     Outcome: Ongoing

## 2020-10-31 NOTE — PROGRESS NOTES
atrial flutter with 3-1 conduction. He was admitted with acute on chronic respiratory failure related to diastolic heart failure, but there was concern for toxic metabolic encephalopathy, questionable UTI versus infected sacral ulcer and he was started on broad-spectrum antimicrobial therapy. Patient was seen by the cardiology service and underwent a catheterization for acute coronary syndrome and was found to have multivessel coronary artery disease with some lung lesions that were heavily calcified and CT surgery was consulted for coronary artery bypass grafting    The urine culture did end up growing ESBL Klebsiella pneumonia and I been asked to evaluate and help with antibiotic choice    The patient at this point in time does have multiple complaints reporting being tired of lying in bed and is reporting pain in his lower back and gluteal area. He does not report any subjective fever or chills. He reports that his breathing is overall okay and his shortness of breath is improved. He does report some mild dysuria but no urinary frequency. CURRENT EVALUATION :    Afebrile  VS stable    Comfortable on 4 L 02 per NC  RR 16  02 sat 100    On meropenem because of ESBL + Klebsiella in urine in anticipation of CABG. [facility] currently recommending repeat cath and stent placement over CABG. Patient diuresing  Dyspnea has improved. I have personally reviewed the past medical history, past surgical history, medications, social history, and family history, and I have updated the database accordingly. Past Medical History:     Past Medical History:   Diagnosis Date    Blind one eye     Right    CAD (coronary artery disease)     Chronic kidney disease     Diabetes mellitus ([facility] [address]     Hypertension      Past Surgical  History:     Past Surgical History:   Procedure Laterality Date    CARDIAC CATHETERIZATION  10/27/2020    Multi-vessel Coronary Artery Disease. Long lesions, heavily calcified / RCA: Single stenosis. with patent distal stent  /   CT CONSULT    CORONARY ANGIOPLASTY WITH STENT PLACEMENT      RCA DISTAL    HERNIA REPAIR      KIDNEY TRANSPLANT N/A     pt thinks it was the right side    LEG AMPUTATION BELOW KNEE Right 08/2018    TUMOR EXCISION       Medications:      LORazepam  0.5 mg Intravenous Once    meropenem  1 g Intravenous Q8H    metoprolol  2.5 mg Intravenous Once    insulin glargine  15 Units Subcutaneous Nightly    carvedilol  6.25 mg Oral BID WC    [Held by provider] torsemide  40 mg Oral Daily    sodium chloride flush  10 mL Intravenous 2 times per day    aspirin  81 mg Oral Daily    amLODIPine  10 mg Oral Nightly    tacrolimus  3 mg Oral BID    predniSONE  5 mg Oral Daily    mycophenolate  540 mg Oral BID    insulin lispro  0-6 Units Subcutaneous TID WC    insulin lispro  0-3 Units Subcutaneous Nightly    atorvastatin  40 mg Oral Nightly    tamsulosin  0.4 mg Oral Daily    sennosides-docusate sodium  1 tablet Oral Daily    sodium chloride flush  10 mL Intravenous 2 times per day     Social History:     Social History     Socioeconomic History    Marital status:      Spouse name: Not on file    Number of children: Not on file    Years of education: Not on file    Highest education level: Not on file   Occupational History    Not on file   Social Needs    Financial resource strain: Not on file    Food insecurity     Worry: Not on file     Inability: Not on file    Transportation needs     Medical: Not on file     Non-medical: Not on file   Tobacco Use    Smoking status: Never Smoker    Smokeless tobacco: Never Used   Substance and Sexual Activity    Alcohol use: No    Drug use: No    Sexual activity: Not on file   Lifestyle    Physical activity     Days per week: Not on file     Minutes per session: Not on file    Stress: Not on file   Relationships    Social connections     Talks on phone: Not on file     Gets together: Not on file     Attends Yazdanism service: Not on file     Active member of club or organization: Not on file     Attends meetings of clubs or organizations: Not on file     Relationship status: Not on file    Intimate partner violence     Fear of current or ex partner: Not on file     Emotionally abused: Not on file     Physically abused: Not on file     Forced sexual activity: Not on file   Other Topics Concern    Not on file   Social History Narrative    Not on file     Family History:   History reviewed. No pertinent family history. Allergies:   Contrast  [iodides]     Review of Systems:   General: No fevers or chills. Eyes: No double vision or blurry vision. ENT: No sore throat or runny nose. Cardiovascular: No chest pain or palpitations. Lung: He does have some mild shortness of breath but overall is improved. Significant cough  Abdomen: No nausea, vomiting, diarrhea, or abdominal pain. Genitourinary: No urinary frequency but does have some mild dysuria  Musculoskeletal: He reports some mild back pain and gluteal pain. Hematologic: No bleeding or bruising. Neurologic: No headache, weakness, numbness, or tingling. Physical Examination :   BP (!) 137/30   Pulse 69   Temp 98.3 °F (36.8 °C) (Oral)   Resp 15   Ht 6' 1\" (1.854 m)   Wt 225 lb 6.4 oz (102.2 kg)   SpO2 96%   BMI 29.74 kg/m²     Temperature Range: Temp: 98.3 °F (36.8 °C) Temp  Av.9 °F (36.6 °C)  Min: 97.1 °F (36.2 °C)  Max: 98.5 °F (36.9 °C)  General Appearance: alert and oriented to person, place and time, well-developed and well-nourished, in no acute distress and he has a sitter in the room with him as at times he has been agitated but it is my understanding he is actually doing very well tonight.   Head: Normocephalic, without obvious abnormality, atraumatic  Eyes: Pupils equal, round, reactive, to light and accommodation; extraocular movements intact; sclera anicteric; conjunctivae pink  ENT: Oropharynx clear, without erythema, exudate, or thrush. Neck: Supple, without lymphadenopathy. Pulmonary/Chest: Few scattered rales appreciated bilaterally  Cardiovascular: There is a soft systolic ejection murmur appreciated   Abdomen: soft, non-tender, non-distended, normal bowel sounds, no masses or organomegaly  Extremities: Status post bilateral below the knee amputations  Neurologic: No gross sensory or motor deficits. Skin: He has a stage III sacral ulceration which is clean not currently infected    Medical Decision Making:   I have independently reviewed/ordered the following labs:  CBC with Differential:   Recent Labs     10/30/20  0245 10/31/20  0706   WBC 5.0 4.3   HGB 8.5* 8.4*   HCT 29.9* 30.4*   * See Reflexed IPF Result   LYMPHOPCT 15* 10*   MONOPCT 11 8*     BMP:   Recent Labs     10/30/20  0052 10/30/20  0245 10/31/20  0706   NA  --  138 138   K 4.2 3.8 4.8   CL  --  102 104   CO2  --  28 25   BUN  --  46* 46*   CREATININE  --  1.85* 1.78*   MG 2.1  --   --      Hepatic Function Panel: No results for input(s): PROT, LABALBU, BILIDIR, IBILI, BILITOT, ALKPHOS, ALT, AST in the last 72 hours. No results found for: PROCAL    No results found for: CRP  No results found for: FERRITIN  No results found for: FIBRINOGEN  No results found for: DDIMER  No results found for: LDH    No results found for: SEDRATE    Lab Results   Component Value Date    COVID19 Not Detected 10/24/2020     No results found for requested labs within last 30 days. Imaging Studies:   CT OF THE CHEST WITHOUT CONTRAST 10/29/2020 10:03 am FINDINGS:  Moderate right greater than left layering simple pleural effusions with associated atelectasis. As this occurs in the setting of mild pulmonary edema and dependent subcutaneous edema, this presumably relates to positive fluid balance. Findings suggestive of underlying pulmonary cystic disease with numerous randomly distributed thin walled cysts throughout the aerated portions of the lungs bilaterally.   Correlate with prior clinical history of such. Cardiomegaly with coronary artery, aortic valvular, and papillary muscle calcifications and a small pericardial effusion. Cholelithiasis. 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 ONE XRAY VIEW OF THE CHEST 10/24/2020 1:30 pm   FINDINGS:   Cardiomegaly with hydrostatic edema and small layering pleural effusions in keeping with acute congestive heart failure. Vl Dup Carotid Bilateral Result Date: 10/28/2020  Summary     The study demonstrates:   Right:  Internal carotid artery has a mild, <50% stenosis based on velocities. The vertebral artery is patent with antegrade flow. Left:  Internal carotid artery has a mild, <50% stenosis based on velocities. The vertebral artery is patent with antegrade flow. Electronically signed by Trudi Crain(Interpreting  physician) on 10/28/2020 01:51 AM     Vl Vein Mapping Lower Bilateral Result Date: 10/28/2020  Findings are:   Right:  Limited study, in the visualized areas no superficial or deep vein  thrombosis was identified. Vein sizes are listed in the table below. Left:  Limited study, in the visualized areas no superficial or deep vein  thrombosis was identified. Vein sizes are listed in the table below. Electronically signed by Trudi Crain(Interpreting  physician) on 10/28/2020 01:53 AM     Cultures:     Culture, Urine [[id number]]  (Abnormal)   Collected: 10/25/20 1814    Order Status: Completed  Specimen: Urine, straight catheter  Updated: 10/28/20 1410     Specimen Description  . URINE,STRAIGHT CATHETER     Special Requests  NOT REPORTED     Culture  KLEBSIELLA PNEUMONIAE <[id number] CFU/ML THIS ORGANISM IS AN EXTENDED-SPECTRUM BETA-LACTAMASE  AND RESISTANCE TO THERAPY WITH PENICILLINS, CEPHALOSPORINS AND AZTREONAM IS EXPECTED.  THESE ORGANISMS GENERALLY REMAIN SUSCEPTIBLE TO CARBAPENEMS.  CONSIDER ID CONSULTATION. Abnormal     Klebsiella pneumoniae (1)     Antibiotic Interpretation  JASMEET  Status     amikacin    Final      NOT REPORTED    ampicillin  Resistant   Final      >=32   RESISTANT    ampicillin-sulbactam    Final      NOT REPORTED    aztreonam  Resistant   Final      32   RESISTANT    ceFAZolin  Resistant   Final      >=64   RESISTANT    ceFAZolin  Resistant  Cefazolin sensitivity results can be used to predict the effectiveness of oral cephalosporins (eg. Cephalexin) in uncomplicated Urinary Tract Infections due to E. coli, K. pneumoniae, and P. mirabilis  Final     cefepime  Resistant   Final      >=64   RESISTANT    cefTRIAXone  Resistant   Final      >=64   RESISTANT    ciprofloxacin  Resistant   Final      >=4   RESISTANT    ertapenem    Final      NOT REPORTED    Confirmatory Extended Spectrum Beta-Lactamase  Positive  POSITIVE  Final     gentamicin  Sensitive   Final      <=1   SUSCEPTIBLE    meropenem  Sensitive   Final      <=0.25   SUSCEPTIBLE    nitrofurantoin  Intermediate   Final      64   INTERMEDIATE    tigecycline    Final      NOT REPORTED    tobramycin  Intermediate   Final      8   INTERMEDIATE    trimethoprim-sulfamethoxazole  Sensitive   Final      <=20   SUSCEPTIBLE    piperacillin-tazobactam  Resistant   Final      16   RESISTANT            Thank you for allowing us to participate in the care of this patient. Please call with questions. Electronically signed by Ioana Mack MD on 10/31/2020 at 4:41 PM      [facility]  [address] [place] [address] messaging  OFFICE: [phone]      This note is created with the assistance of a speech recognition program.  While intending to generate a document that actually reflects the content of the visit, the document can still have some errors including those of syntax and sound a like substitutions which may escape proof reading. In such instances, actual meaning can be extrapolated by contextual diversion.

## 2020-10-31 NOTE — PROGRESS NOTES
Renal Progress Note    Patient :  Johnny Davis; 66 y.o. MRN# [id number]  Location:    Attending:  Dwayne Riggs MD  Admit Date:  10/24/2020   Hospital Day: 7      Subjective: Following for CYNTHIA/CKD with history of  donor transplant in , managed at [facility], Dr Bob Causey admitted with dyspnea, respiratory failure and CHF. He is s/p cardiac cath 10/27 showing MVCAS. CVS recommending repeat cath and stent placement over CABG  Diuretics are on hold. Creatinine is down to 1.78. Previously 1.85. Baseline 1.4-1.6. Had normal saline for 10 hours yesterday. Urine output 625, he is -4 liters since admission. Vitals stable  Weak and lethargic. Wakens but minimal conversation.   Per nursing he was oriented this morning and sat up and ate breakfast  Denies dyspnea or chest pain    Outpatient Medications:     Medications Prior to Admission: Pollen Extracts (PROSTAT PO), Take by mouth  acetaminophen (TYLENOL) 325 MG tablet, Take 650 mg by mouth every 6 hours as needed for Pain  apixaban (ELIQUIS) 5 MG TABS tablet, Take by mouth 2 times daily  atorvastatin (LIPITOR) 40 MG tablet, Take 40 mg by mouth nightly  vitamin D (CHOLECALCIFEROL) 125 MCG (5000 UT) CAPS capsule, Take 5,000 Units by mouth daily  docusate sodium (COLACE) 100 MG capsule, Take 100 mg by mouth 3 times daily  magnesium oxide (MAG-OX) 400 MG tablet, Take 400 mg by mouth daily  mycophenolate (MYFORTIC) 180 MG DR tablet, Take 540 mg by mouth 2 times daily  ondansetron (ZOFRAN) 4 MG tablet, Take 4 mg by mouth every 8 hours as needed for Nausea or Vomiting  POTASSIUM CHLORIDE ER PO, Take 20 mEq by mouth daily  b complex vitamins capsule, Take 1 capsule by mouth daily  tacrolimus (PROGRAF) 1 MG capsule, Take 3 mg by mouth 2 times daily  aspirin (ASPIRIN 81) 81 MG EC tablet, Take by mouth daily   furosemide (LASIX) 40 MG tablet, Take 40 mg by mouth 2 times daily  [] ertapenem (INVANZ) 1 GM injection, Inject 1,000 mg into the muscle every 24 hours  dorzolamide (TRUSOPT) 2 % ophthalmic solution, Place 1 drop into the right eye 2 times daily   albuterol (PROVENTIL) (2.5 MG/3ML) 0.083% nebulizer solution, Take 2.5 mg by nebulization every 4 hours as needed for Wheezing   polyethylene glycol (GLYCOLAX) 17 g packet, Take 17 g by mouth daily   metoprolol tartrate (LOPRESSOR) 25 MG tablet, Take 25 mg by mouth 2 times daily  sennosides-docusate sodium (SENOKOT-S) 8.6-50 MG tablet, Take 2 tablets by mouth daily   predniSONE (DELTASONE) 5 MG tablet, Take 5 mg by mouth daily  tamsulosin (FLOMAX) 0.4 MG capsule, Take 0.4 mg by mouth nightly   [DISCONTINUED] apixaban (ELIQUIS) 2.5 MG TABS tablet, Take by mouth 2 times daily  amLODIPine (NORVASC) 5 MG tablet, Take 10 mg by mouth daily   insulin glargine (BASAGLAR KWIKPEN) 100 UNIT/ML injection pen, Inject 30 Units into the skin daily   bisacodyl (DULCOLAX) 10 MG suppository, Place 10 mg rectally daily  bumetanide (BUMEX) 1 MG tablet, Take 2 mg by mouth 2 times daily  colchicine (COLCRYS) 0.6 MG tablet, Take 0.6 mg by mouth daily  gabapentin (NEURONTIN) 300 MG capsule, Take 300 mg by mouth 3 times daily. .  insulin lispro (HUMALOG) 100 UNIT/ML injection vial, Inject into the skin 3 times daily (before meals)  [DISCONTINUED] dorzolamide-timolol (COSOPT) 22.3-6.8 MG/ML ophthalmic solution,   [DISCONTINUED] acetaminophen (TYLENOL) 500 MG tablet, Take 500 mg by mouth every 6 hours as needed for Pain  [DISCONTINUED] aspirin 81 MG tablet, Take 81 mg by mouth daily  [DISCONTINUED] atorvastatin (LIPITOR) 20 MG tablet, Take 40 mg by mouth daily   [DISCONTINUED] insulin lispro (HUMALOG) 100 UNIT/ML injection vial, Inject 15 Units into the skin 3 times daily (before meals)     Current Medications:     Scheduled Meds:    LORazepam  0.5 mg Intravenous Once    meropenem  1 g Intravenous Q8H    metoprolol  2.5 mg Intravenous Once    insulin glargine  15 Units Subcutaneous Nightly    carvedilol  6.25 mg Oral BID WC    [Held by provider] torsemide  40 mg Oral Daily    sodium chloride flush  10 mL Intravenous 2 times per day    aspirin  81 mg Oral Daily    amLODIPine  10 mg Oral Nightly    tacrolimus  3 mg Oral BID    predniSONE  5 mg Oral Daily    mycophenolate  540 mg Oral BID    insulin lispro  0-6 Units Subcutaneous TID WC    insulin lispro  0-3 Units Subcutaneous Nightly    atorvastatin  40 mg Oral Nightly    tamsulosin  0.4 mg Oral Daily    sennosides-docusate sodium  1 tablet Oral Daily    sodium chloride flush  10 mL Intravenous 2 times per day     Continuous Infusions:    sodium chloride Stopped (10/31/20 0327)    heparin (PORCINE) Infusion 12 Units/kg/hr (10/31/20 0142)    dextrose       PRN Meds:  sodium chloride flush, acetaminophen, labetalol, sodium chloride flush, acetaminophen **OR** acetaminophen, polyethylene glycol, promethazine **OR** ondansetron, heparin (porcine), heparin (porcine), glucose, dextrose, glucagon (rDNA), dextrose    Input/Output:       I/O last 3 completed shifts: In: 1 [P.O.:200; I.V.:855]  Out: 625 [Urine:625]. Patient Vitals for the past 96 hrs (Last 3 readings):   Weight   10/30/20 0600 225 lb 6.4 oz (102.2 kg)   10/29/20 0600 224 lb 3.2 oz (101.7 kg)       Vital Signs:   Temperature:  Temp: 97.7 °F (36.5 °C)  TMax:   Temp (24hrs), Av.8 °F (36.6 °C), Min:97.1 °F (36.2 °C), Max:98.5 °F (36.9 °C)    Respirations:  Resp: 21  Pulse:   Pulse: 70  BP:    BP: (!) 113/52  BP Range: Systolic (10FOQ), MTA:811 , Min:88 , HNL:553       Diastolic (63HDJ), DOA:86, Min:35, Max:93      Physical Examination:     General:  Lethargic  HEENT: Atraumatic, normocephalic, no throat congestion, moist mucosa. Eyes:   Pupils equal, round and reactive to light, EOMI. Neck:   No JVD, no thyromegaly, no lymphadenopathy. Chest:              Bilateral vesicular breath sounds, no rales or wheezes.   Prolonged expiration  Cardiac:  S1 S2 irregular, no murmurs, gallops or rubs, JVP not raised. Abdomen: Soft, non-tender, no masses or organomegaly, BS audible. :   No suprapubic or flank tenderness. Neuro:  Lethargic  SKIN:  No rashes, good skin turgor. Extremities:  Bilateral BKA    Labs:       Recent Labs     10/29/20  0541 10/30/20  0245 10/31/20  0706   WBC 4.8 5.0 4.3   RBC 2.85* 2.96* 2.89*   HGB 8.2* 8.5* 8.4*   HCT 27.9* 29.9* 30.4*   MCV 97.9 101.0 105.2*   MCH 28.8 28.7 29.1   MCHC 29.4 28.4 27.6*   RDW 18.0* 17.8* 17.8*   * 100* See Reflexed IPF Result   MPV 12.2 12.7 NOT REPORTED      BMP:   Recent Labs     10/29/20  0541 10/30/20  0052 10/30/20  0245 10/31/20  0706     --  138 138   K 3.9 4.2 3.8 4.8     --  102 104   CO2 31  --  28 25   BUN 48*  --  46* 46*   CREATININE 1.53*  --  1.85* 1.78*   GLUCOSE 236*  --  191* 107*   CALCIUM 9.6  --  9.1 9.1      Magnesium:    Recent Labs     10/30/20  0052   MG 2.1     SPEP:  Lab Results   Component Value Date    PROT 7.2 10/24/2020    PROT 7.2 05/11/2017       Urinalysis/Chemistries:      Lab Results   Component Value Date    NITRU NEGATIVE 10/25/2020    COLORU YELLOW 10/25/2020    PHUR 6.5 10/25/2020    WBCUA TOO NUMEROUS TO COUNT 10/25/2020    RBCUA TOO NUMEROUS TO COUNT 10/25/2020    MUCUS NOT REPORTED 10/25/2020    TRICHOMONAS NOT REPORTED 10/25/2020    YEAST NOT REPORTED 10/25/2020    BACTERIA FEW 10/25/2020    SPECGRAV 1.009 10/25/2020    LEUKOCYTESUR LARGE 10/25/2020    UROBILINOGEN Normal 10/25/2020    BILIRUBINUR NEGATIVE 10/25/2020    GLUCOSEU NEGATIVE 10/25/2020    KETUA NEGATIVE 10/25/2020    AMORPHOUS NOT REPORTED 10/25/2020     Urine Sodium:     Lab Results   Component Value Date    GENNARO 85 10/25/2020     Urine Creatinine:     Lab Results   Component Value Date    LABCREA 24.3 10/25/2020     Urine Eosinophils:  No components found for: UEOS    Radiology:     CXR:     Assessment:     1. Acute kidney injury likely secondary to hypotension and overdiuresis.   Patient was on Demadex which is currently on hold. 2. 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 3.  Status post  donor kidney transplant in  at [address] follows up with Dr. Darcia Bloch  3. NSTEMI status post multivessel coronary disease, will require CABG. 4.  Diabetes type 2. HgB A1C was 8-9  5. History of  donor renal transplant done in  at [address] 6.  Essential hypertension. 7.  Decompensated heart failure. S/p right pleural effusion  8. 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 Preserved LV function. On Heparin drip  10. ESBL Klebsiella Pneumonia: ID started Meropenem     Plan:   1. Continue current immunosuppressants, currently on tacrolimus 3 mg twice a day,  mg twice a day and prednisone 5 mg daily. Prograf level <2.0 on 10/26  2. Continue to hold diuretics  3. Daily Labs  4. Will follow  5. Has an indwelling Santos from before  6. Fluid restriction 1.5 L a day  7. We will restart diuretics in the next 1 to 2 days    Nutrition   Please ensure that patient is on a renal diet/TF. Avoid nephrotoxic drugs/contrast exposure. We will continue to follow along with you. Attending Physician Statement  I have discussed the care of [facility], including pertinent history and exam findings with the resident/fellow. I have reviewed the key elements of all parts of the encounter with the resident/fellow. I have seen and examined the patient with the resident/fellow. I agree with the assessment and plan and status of the problem list as documented.       .  Electronically signed by Ney Tong MD on 10/31/2020 at 4:15 PM

## 2020-10-31 NOTE — PROGRESS NOTES
Physical Therapy  DATE: 10/31/2020    NAME: Abiola Poster  MRN: [id number]   : 1945    Patient not seen this date for Physical Therapy due to:  [] Blood transfusion in progress  [] Hemodialysis  [x]  Patient Declined sating not ready for Pt this date.  [] Spine Precautions   [] Strict Bedrest  [] Surgery/ Procedure  [] Testing      [] Other        [] PT being discontinued at this time. Patient independent. No further needs. [] PT being discontinued at this time as the patient has been transferred to palliative care. No further needs.     Pierre Ramos, PTA

## 2020-10-31 NOTE — PROGRESS NOTES
PATIENT REFUSES TO WEAR BIPAP     [x] Risks and benefits explained to patient   [x] Patient refuses to wear Bipap stating \"I don't wear one at home and my sleeping / breathing is fine at the moment. \"   [x] Patient verbalizes understanding of information presented.

## 2020-10-31 NOTE — PLAN OF CARE
Problem: Pain:  Goal: Pain level will decrease  Description: Pain level will decrease  Outcome: Ongoing  Goal: Control of acute pain  Description: Control of acute pain  Outcome: Ongoing  Goal: Control of chronic pain  Description: Control of chronic pain  Outcome: Ongoing     Problem: Skin Integrity:  Goal: Will show no infection signs and symptoms  Description: Will show no infection signs and symptoms  Outcome: Ongoing  Goal: Absence of new skin breakdown  Description: Absence of new skin breakdown  Outcome: Ongoing     Problem: Falls - Risk of:  Goal: Will remain free from falls  Description: Will remain free from falls  Outcome: Ongoing  Goal: Absence of physical injury  Description: Absence of physical injury  Outcome: Ongoing     Problem: Restraint Use - Nonviolent/Non-Self-Destructive Behavior:  Goal: Absence of restraint indications  Description: Absence of restraint indications  Outcome: Ongoing  Goal: Absence of restraint-related injury  Description: Absence of restraint-related injury  Outcome: Ongoing     Problem: Musculor/Skeletal Functional Status  Goal: Highest potential functional level  Outcome: Ongoing     Problem: Nutrition  Goal: Optimal nutrition therapy  Description: Nutrition Problem #1: Increased nutrient needs  Intervention: Food and/or Nutrient Delivery: Modify Current Diet, Start Oral Nutrition Supplement  Nutritional Goals: Pt to consume >75% of est'd daily needs via PO     Outcome: Ongoing     Problem: Cardiac:  Goal: Ability to maintain an adequate cardiac output will improve  Description: Ability to maintain an adequate cardiac output will improve  Outcome: Ongoing  Goal: Hemodynamic stability will improve  Description: Hemodynamic stability will improve  Outcome: Ongoing     Problem: Fluid Volume:  Goal: Ability to achieve and maintain adequate urine output will improve  Description: Ability to achieve and maintain adequate urine output will improve  Outcome: Ongoing     Problem: Respiratory:  Goal: Respiratory status will improve  Description: Respiratory status will improve  Outcome: Ongoing

## 2020-11-01 NOTE — PROGRESS NOTES
PATIENT REFUSES TO WEAR BIPAP     [x] Risks and benefits explained to patient   [x] Patient refuses to wear Bipap stating no I dont use one  [x] Patient verbalizes understanding of information presented.

## 2020-11-01 NOTE — PROGRESS NOTES
[facility]   Progress Note                   Date:   11/1/2020  Patient name: Ana Paula Vines  Date of admission:  10/24/2020  4:14 PM  MRN:   [id number]  YOB: 1945  PCP: Ryann Quick MD    Reason for Admission: Shortness of breath [R06.02]  Shortness of breath [R06.02]    Subjective:       Clinical Changes / Abnormalities: Pt seen and examined in the room Lying quietly in bed eating breakfast. Pt denies any CP or SOB. Remains Aflutter , rate controlled.       -3.3L since admission  Medications:   Scheduled Meds:   insulin lispro  2 Units Subcutaneous TID WC    LORazepam  0.5 mg Intravenous Once    meropenem  1 g Intravenous Q8H    metoprolol  2.5 mg Intravenous Once    insulin glargine  15 Units Subcutaneous Nightly    carvedilol  6.25 mg Oral BID WC    [Held by provider] torsemide  40 mg Oral Daily    sodium chloride flush  10 mL Intravenous 2 times per day    aspirin  81 mg Oral Daily    amLODIPine  10 mg Oral Nightly    tacrolimus  3 mg Oral BID    predniSONE  5 mg Oral Daily    mycophenolate  540 mg Oral BID    insulin lispro  0-6 Units Subcutaneous TID WC    insulin lispro  0-3 Units Subcutaneous Nightly    atorvastatin  40 mg Oral Nightly    tamsulosin  0.4 mg Oral Daily    sennosides-docusate sodium  1 tablet Oral Daily    sodium chloride flush  10 mL Intravenous 2 times per day     Continuous Infusions:   heparin (PORCINE) Infusion 12.106 Units/kg/hr (11/01/20 0847)    dextrose       CBC:   Recent Labs     10/30/20  0245 10/31/20  0706 11/01/20  0751   WBC 5.0 4.3 4.3   HGB 8.5* 8.4* 8.2*   * See Reflexed IPF Result See Reflexed IPF Result     BMP:    Recent Labs     10/30/20  0245 10/31/20  0706 11/01/20  0751    138 138   K 3.8 4.8 3.9    104 103   CO2 28 25 27   BUN 46* 46* 47*   CREATININE 1.85* 1.78* 2.12*   GLUCOSE 191* 107* 130*     Hepatic: No results for input(s): AST, ALT, ALB, BILITOT, ALKPHOS in the last 72 hours.  Troponin: No results for input(s): TROPHS in the last 72 hours. BNP: No results for input(s): BNP in the last 72 hours. Lipids: No results for input(s): CHOL, HDL in the last 72 hours. Invalid input(s): LDLCALCU  INR: No results for input(s): INR in the last 72 hours. Echo 10/10/20  Result Narrative     Left Ventricle: Systolic function is hyperdynamic with an ejection   fraction over 70%.   Aortic Valve: There is no regurgitation. There is mild stenosis. The   calculated aortic valve area is 1.80 cm2. The calculated aortic valve peak   gradient is 17.00 mmHg. The calculated aortic valve mean gradient is 12.0   mmHg.   Tricuspid Valve: RVSP calculated at 64 mmHg. RVSP is based on RA   pressure of 8 mmHg.   Left Ventricle: There is severe increased wall thickness/hypertrophy.      Echo 10/26/20    Summary  Left ventricle is normal in size. Global left ventricular systolic function  is normal.  Calculated ejection fraction 58% by Carrion's method. Normal mitral valve structure and function. Mild mitral regurgitation. Normal tricuspid valve structure. Trivial tricuspid regurgitation.     Cath 10/27/20:  LMCA: Diffuse irregularities 20-30%. LAD: Diffuse irregularities 30-40% and Single stenosis.    Lesion on Mid LAD: 80% stenosis 50 mm length. Lesion plaque is ruptured.    Comments:heavily calcified. LCx: Multiple stenosis.    Lesion on Prox CX: 90% stenosis.    Lesion on 1st Ob Yue: Mid subsection. 75% stenosis.    Lesion on 2nd Ob Yue: Mid subsection. 80% stenosis. RCA: Single stenosis. with patent distal stent    Lesion on Mid RCA: 75% stenosis.       Objective:   Vitals: BP (!) 116/52   Pulse 69   Temp 98.4 °F (36.9 °C) (Oral)   Resp 20   Ht 6' 1\" (1.854 m)   Wt 225 lb 6.4 oz (102.2 kg)   SpO2 100%   BMI 29.74 kg/m²   General appearance: alert and cooperative with exam  HEENT: Head: Normocephalic, no lesions, without obvious abnormality.   Neck: no JVD, trachea midline, no adenopathy  Lungs: Rales bilaterally  Heart: irregular rate and rhythm, s1/s2 auscultated, no murmurs, flutter   Abdomen: soft, non-tender, bowel sounds active  Extremities: no edema, bilateral BKA   Neurologic: not done        Assessment / Acute Cardiac Problems:   1. MV CAD on cath 10/27/20  2. Acute on chronic diastolic heart failure  3. Preserved EF  4. NSTEMI  5. Atrial Fibrillation/Atrial Flutter on eliquis at home  6. T2Dm  7. HTN  8. PVD s/p MICHEAL BKA  9. Renal Failure s/p transplant       Patient Active Problem List:     Shortness of breath     Acute respiratory acidosis     Acute on chronic respiratory failure with hypoxia and hypercapnia (HCC)     Acute on chronic diastolic (congestive) heart failure (HCC)     Hypernatremia     CYNTHIA (acute kidney injury) ([facility] [address]     Normocytic anemia     Atrial flutter with controlled response (HCC)     Anemia of chronic renal failure     History of renal transplant     Hyperlipidemia     Nonproliferative diabetic retinopathy (HCC)     Obstructive sleep apnea syndrome     Stage 3 chronic kidney disease     Uncontrolled type 2 diabetes mellitus (HCC)     S/P bilateral BKA (below knee amputation) ([facility] [address]     Acute cystitis with hematuria     Sacral decubitus ulcer     Hypokalemia     Persistent proteinuria     NSTEMI (non-ST elevated myocardial infarction) ([facility] [address]     Multiple vessel coronary artery disease      Plan of Treatment:   1. CAD- MVD per Cardiac cath. Await CTS preop testing and final recs. Per notes will be discussed in AM with surgeon. 2.  Continue CCB, ASA, statin, BB. Heparin gtt     2. CHF- Improving. Appreciate nephrology assistance. 3. HTN- Stable. Continue current medications   4. Right pleural effusion s/p thoracentesis today per CT. 4.   Afluttter. Rate controlled. Continue Heparin and BB. On Eliquis at home, will resume on discharge.       Electronically signed by ALFREDO Rivera CNP on 11/1/2020 at 9:20 Panda Boyd [address] Cardiology [address] [facility].  [phone]

## 2020-11-01 NOTE — PROGRESS NOTES
[facility]  Internal Medicine Teaching Residency Program  Inpatient Daily Progress Note  ______________________________________________________________________________    Patient: Alvaro Chappell  YOB: 1945   QXO:[id number]    Acct: [de-identified]     Room: 2019/2019-01  Admit date: 10/24/2020  Today's date: 11/01/20  Number of days in the hospital: 8    SUBJECTIVE   Admitting Diagnosis: Acute on chronic respiratory failure with hypoxia and hypercapnia (HCC)  CC: Shortness of breath  Pt examined at bedside. Chart & results reviewed. No acute overnights. On 4L NC. Denies chest pain, abdominal pain, nausea, vomiting, fever, chills, diarrhea and constipation. Hemodynamically stable   BP: 113/52, HR: 70     Plan for CABG on 11/2. Patient has no sufficient grafts for CABG. Cardiology will re-evaluate if the patient is a candidate for CABG. Low urine output. Nephrology following. Tacrolimus level pending. Torsemide on hold due to increase creat   1.20>1.53>1.85> 1.78     Creatinine 1.78 > 2.12     Pleural fluid culture shows no growth. Urine culture 10/25 shows Klebsiella colonization.      ROS:  Constitutional:  negative for chills, fevers, sweats  Respiratory:  negative for cough, dyspnea on exertion, hemoptysis, shortness of breath, wheezing  Cardiovascular:  negative for chest pain, chest pressure/discomfort, lower extremity edema, palpitations  Gastrointestinal:  negative for abdominal pain, constipation, diarrhea, nausea, vomiting  Neurological:  negative for dizziness, headache  BRIEF HISTORY     69-year-old -American male with past medical history of renal failure s/p renal transplant (2012), congestive heart failure with preserved ejection fraction with greater than 70%, COPD on 2 L home oxygen, pulmonary hypertension with calculated RVSP 64 mmHg, diabetes mellitus type 2, hypertension, chronic immune suppression on Myfortic and tacrolimus, bilateral below-knee amputation, A. fib on Eliquis, presented to the emergency department with shortness of breath.  He was transferred from other hospital due to nonavailability of beds.      Was recently discharge from [facility] after respiratory failure due to CHF and pneumonia where he was intubated.  Reports of increasing shortness of breath, chest pain, swelling of the body.  Denies fever, chills, abdominal pain, common cold. Patient wife states that her  reports back pain due to sacral decubitis    OBJECTIVE     Vital Signs:  BP (!) 107/54   Pulse 63   Temp 98.3 °F (36.8 °C) (Oral)   Resp 16   Ht 6' 1\" (1.854 m)   Wt 225 lb 6.4 oz (102.2 kg)   SpO2 96%   BMI 29.74 kg/m²     Temp (24hrs), Av.4 °F (36.9 °C), Min:98.3 °F (36.8 °C), Max:98.6 °F (37 °C)    In: 1037   Out: -     Physical Exam:  Constitutional: This is a well developed, well nourished, 25-29.9 - Overweight 66y.o. year old male who is alert, oriented, cooperative and in no apparent distress. Head:normocephalic and atraumatic. EENT:  PERRLA. No conjunctival injections. Septum was midline, mucosa was without erythema, exudates or cobblestoning. No thrush was noted. Neck: Supple without thyromegaly. No elevated JVP. Trachea was midline. Respiratory: Chest was symmetrical without dullness to percussion. Breath sounds bilaterally were clear to auscultation. There were no wheezes, rhonchi or rales. There is no intercostal retraction or use of accessory muscles. No egophony noted. Cardiovascular: Regular without murmur, clicks, gallops or rubs. Abdomen: Slightly rounded and soft without organomegaly. No rebound, rigidity or guarding was appreciated. Lymphatic: No lymphadenopathy. Musculoskeletal: Normal curvature of the spine. No gross muscle weakness. Extremities: B/l BKA    Skin:  Warm and dry. Good color, turgor and pigmentation. No lesions or scars.   No cyanosis or clubbing  Neurological/Psychiatric: The patient's general behavior, level of consciousness, thought content and emotional status is normal.        Medications:  Scheduled Medications:    insulin lispro  2 Units Subcutaneous TID WC    LORazepam  0.5 mg Intravenous Once    meropenem  1 g Intravenous Q8H    metoprolol  2.5 mg Intravenous Once    insulin glargine  15 Units Subcutaneous Nightly    carvedilol  6.25 mg Oral BID WC    [Held by provider] torsemide  40 mg Oral Daily    sodium chloride flush  10 mL Intravenous 2 times per day    aspirin  81 mg Oral Daily    amLODIPine  10 mg Oral Nightly    tacrolimus  3 mg Oral BID    predniSONE  5 mg Oral Daily    mycophenolate  540 mg Oral BID    insulin lispro  0-6 Units Subcutaneous TID WC    insulin lispro  0-3 Units Subcutaneous Nightly    atorvastatin  40 mg Oral Nightly    tamsulosin  0.4 mg Oral Daily    sennosides-docusate sodium  1 tablet Oral Daily    sodium chloride flush  10 mL Intravenous 2 times per day     Continuous Infusions:    heparin (PORCINE) Infusion 12.106 Units/kg/hr (11/01/20 0847)    dextrose       PRN Medicationssodium chloride flush, 10 mL, PRN  acetaminophen, 650 mg, Q4H PRN  labetalol, 20 mg, Q2H PRN  sodium chloride flush, 10 mL, PRN  acetaminophen, 650 mg, Q6H PRN    Or  acetaminophen, 650 mg, Q6H PRN  polyethylene glycol, 17 g, Daily PRN  promethazine, 12.5 mg, Q6H PRN    Or  ondansetron, 4 mg, Q6H PRN  heparin (porcine), 4,000 Units, PRN  heparin (porcine), 2,000 Units, PRN  glucose, 15 g, PRN  dextrose, 12.5 g, PRN  glucagon (rDNA), 1 mg, PRN  dextrose, 100 mL/hr, PRN        Diagnostic Labs:  CBC:   Recent Labs     10/30/20  0245 10/31/20  0706 11/01/20  0751   WBC 5.0 4.3 4.3   RBC 2.96* 2.89* 2.82*   HGB 8.5* 8.4* 8.2*   HCT 29.9* 30.4* 27.9*   .0 105.2* 98.9   RDW 17.8* 17.8* 17.6*   * See Reflexed IPF Result See Reflexed IPF Result     BMP:   Recent Labs     10/30/20  0245 10/31/20  0706 11/01/20  0751    138 138   K 3.8 4.8 3.9    104 103   CO2 28 25 27   BUN 46* 46* 47*   CREATININE 1.85* 1.78* 2.12*     BNP: No results for input(s): BNP in the last 72 hours. PT/INR: No results for input(s): PROTIME, INR in the last 72 hours. APTT:   Recent Labs     10/31/20  0706 10/31/20  1436 11/01/20  0751   APTT 61.4* 63.2* 70.5*     CARDIAC ENZYMES: No results for input(s): CKMB, CKMBINDEX, TROPONINI in the last 72 hours. Invalid input(s): CKTOTAL;3  FASTING LIPID PANEL:  Lab Results   Component Value Date    CHOL 166 05/11/2017    HDL 43 05/11/2017    TRIG 132 05/11/2017     LIVER PROFILE: No results for input(s): AST, ALT, ALB, BILIDIR, BILITOT, ALKPHOS in the last 72 hours. MICROBIOLOGY:   Lab Results   Component Value Date/Time    CULTURE PENDING 10/30/2020 10:54 AM       Imaging:    Ct Chest Wo Contrast    Result Date: 10/29/2020  Moderate right greater than left layering simple pleural effusions with associated atelectasis. As this occurs in the setting of mild pulmonary edema and dependent subcutaneous edema, this presumably relates to positive fluid balance. Findings suggestive of underlying pulmonary cystic disease with numerous randomly distributed thin walled cysts throughout the aerated portions of the lungs bilaterally. Correlate with prior clinical history of such. Cardiomegaly with coronary artery, aortic valvular, and papillary muscle calcifications and a small pericardial effusion. Cholelithiasis. 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 Xr Chest Portable    Result Date: 10/30/2020  No significant pneumothorax status post right thoracentesis. Mild right clavicle enlargement with cortical thickening and a coarsened trabecular pattern likely due to Paget's disease; although probably less likely, metastatic disease would be in the differential diagnosis. Bone scan is recommended for follow-up.  The findings were sent to the Radiology resp failure  5. S/p Cardiac Cath- show multivessel disease. cardiothoracic planning for CABG. On continuous heparin drip. Cardiology to re-evaluate the patient if he is a candidate for CABG due to insufficient veins to harvest.   6. Essential hypertension:controlled. continue amlodipine 10 mg and Coreg twice daily. 7. Elevated troponin: possibly due to underlying demand ischemia, or CYNTHIA or infection. Cardiology following, on heparin drip  8. Urinary tract infection: due to klebsiella pneumonia. On meropenem in light of potential CABG. ID on board  9. Diabetes mellitus Type II: [id number] (2/14/20) on high-dose insulin corrective algorithm and lantus 20 units nightly. On hypoglycemic protocol. 10. Acute kidney injury on top of CKD stage III - Creatinine trending up. Continue to monitor BMP. Nephrology following.   11. Hyperlipidemia-stable, started on Lipitor 40 mg daily  12. A. Fib: On rate control with coreg.  on heparin drip. 13. COPD: Stable will resume home meds on discharge  14. S/p renal transplant: On immunosuppressive agent.  Tacrolimus and mycophenolate sodium. Nephrology on board. hold diuretics for today. 15. Obstructive sleep apnea- Bipap. 16. Anemia of chronic disease: Due to CRF-will monitor hemoglobin  17. DVT Prophylaxis : on heparin drip. Amelia Hill MD  Internal Medicine Resident, PGY-1  [facility];  [place], [place]  11/1/2020, 11:51 AM

## 2020-11-01 NOTE — PROGRESS NOTES
Renal Progress Note    Patient :  Abiola Poster; 66 y.o. MRN# [id number]  Location:    Attending:  Vernetta Cogan, MD  Admit Date:  10/24/2020   Hospital Day: 8      Subjective: Following for CYNTHIA/CKD with history of  donor transplant in , managed at [facility], Dr Giacomo Fleischer admitted with dyspnea, respiratory failure and CHF. He is s/p cardiac cath 10/27 showing MVCAS. CVS recommending repeat cath and stent placement over CABG  Diuretics have been on hold. Sitting up talking on phone. Remains on Oxygen 4 liters. No urine output recorded since 6pm last night. Currently has 400 cc in levine bag. He denies dyspnea. Tacrolimus level pending.   Previous was <2      Outpatient Medications:     Medications Prior to Admission: Pollen Extracts (PROSTAT PO), Take by mouth  acetaminophen (TYLENOL) 325 MG tablet, Take 650 mg by mouth every 6 hours as needed for Pain  apixaban (ELIQUIS) 5 MG TABS tablet, Take by mouth 2 times daily  atorvastatin (LIPITOR) 40 MG tablet, Take 40 mg by mouth nightly  vitamin D (CHOLECALCIFEROL) 125 MCG (5000 UT) CAPS capsule, Take 5,000 Units by mouth daily  docusate sodium (COLACE) 100 MG capsule, Take 100 mg by mouth 3 times daily  magnesium oxide (MAG-OX) 400 MG tablet, Take 400 mg by mouth daily  mycophenolate (MYFORTIC) 180 MG DR tablet, Take 540 mg by mouth 2 times daily  ondansetron (ZOFRAN) 4 MG tablet, Take 4 mg by mouth every 8 hours as needed for Nausea or Vomiting  POTASSIUM CHLORIDE ER PO, Take 20 mEq by mouth daily  b complex vitamins capsule, Take 1 capsule by mouth daily  tacrolimus (PROGRAF) 1 MG capsule, Take 3 mg by mouth 2 times daily  aspirin (ASPIRIN 81) 81 MG EC tablet, Take by mouth daily   furosemide (LASIX) 40 MG tablet, Take 40 mg by mouth 2 times daily  [] ertapenem (INVANZ) 1 GM injection, Inject 1,000 mg into the muscle every 24 hours  dorzolamide (TRUSOPT) 2 % ophthalmic solution, Place 1 drop into the right eye 2 times daily chloride flush  10 mL Intravenous 2 times per day    aspirin  81 mg Oral Daily    amLODIPine  10 mg Oral Nightly    tacrolimus  3 mg Oral BID    predniSONE  5 mg Oral Daily    mycophenolate  540 mg Oral BID    insulin lispro  0-6 Units Subcutaneous TID     insulin lispro  0-3 Units Subcutaneous Nightly    atorvastatin  40 mg Oral Nightly    tamsulosin  0.4 mg Oral Daily    sennosides-docusate sodium  1 tablet Oral Daily    sodium chloride flush  10 mL Intravenous 2 times per day     Continuous Infusions:    heparin (PORCINE) Infusion 12.106 Units/kg/hr (20 0847)    dextrose       PRN Meds:  sodium chloride flush, acetaminophen, labetalol, sodium chloride flush, acetaminophen **OR** acetaminophen, polyethylene glycol, promethazine **OR** ondansetron, heparin (porcine), heparin (porcine), glucose, dextrose, glucagon (rDNA), dextrose    Input/Output:       I/O last 3 completed shifts: In: 3765 [P.O.:620; I.V.:417]  Out: - .      Patient Vitals for the past 96 hrs (Last 3 readings):   Weight   10/30/20 0600 225 lb 6.4 oz (102.2 kg)   10/29/20 0600 224 lb 3.2 oz (101.7 kg)       Vital Signs:   Temperature:  Temp: 98.4 °F (36.9 °C)  TMax:   Temp (24hrs), Av.4 °F (36.9 °C), Min:98.3 °F (36.8 °C), Max:98.6 °F (37 °C)    Respirations:  Resp: 20  Pulse:   Pulse: 69  BP:    BP: (!) 116/52  BP Range: Systolic (46SKR), IAX:626 , Min:84 , CAC:917       Diastolic (65RHP), BYZ:53, Min:30, Max:112      Physical Examination:     General:  Awake and oriented  HEENT: Atraumatic, normocephalic, no throat congestion, moist mucosa. Eyes:   Pupils equal, round and reactive to light, EOMI. Neck:   No JVD, no thyromegaly, no lymphadenopathy. Chest:              Diminished breath sounds in bases, no rales or wheezes. Cardiac:  S1 S2 irregular, no murmurs, gallops or rubs, JVP not raised. Abdomen: Soft, non-tender, no masses or organomegaly, BS audible. :   No suprapubic or flank tenderness.   Neuro: Lethargic  SKIN:  No rashes, good skin turgor. Extremities:  Bilateral BKA    Labs:       Recent Labs     10/30/20  0245 10/31/20  0706 11/01/20  0751   WBC 5.0 4.3 4.3   RBC 2.96* 2.89* 2.82*   HGB 8.5* 8.4* 8.2*   HCT 29.9* 30.4* 27.9*   .0 105.2* 98.9   MCH 28.7 29.1 29.1   MCHC 28.4 27.6* 29.4   RDW 17.8* 17.8* 17.6*   * See Reflexed IPF Result See Reflexed IPF Result   MPV 12.7 NOT REPORTED NOT REPORTED      BMP:   Recent Labs     10/30/20  0245 10/31/20  0706 11/01/20  0751    138 138   K 3.8 4.8 3.9    104 103   CO2 28 25 27   BUN 46* 46* 47*   CREATININE 1.85* 1.78* 2.12*   GLUCOSE 191* 107* 130*   CALCIUM 9.1 9.1 9.2      Magnesium:    Recent Labs     10/30/20  0052   MG 2.1     SPEP:  Lab Results   Component Value Date    PROT 7.2 10/24/2020    PROT 7.2 05/11/2017       Urinalysis/Chemistries:      Lab Results   Component Value Date    NITRU NEGATIVE 10/25/2020    COLORU YELLOW 10/25/2020    PHUR 6.5 10/25/2020    WBCUA TOO NUMEROUS TO COUNT 10/25/2020    RBCUA TOO NUMEROUS TO COUNT 10/25/2020    MUCUS NOT REPORTED 10/25/2020    TRICHOMONAS NOT REPORTED 10/25/2020    YEAST NOT REPORTED 10/25/2020    BACTERIA FEW 10/25/2020    SPECGRAV 1.009 10/25/2020    LEUKOCYTESUR LARGE 10/25/2020    UROBILINOGEN Normal 10/25/2020    BILIRUBINUR NEGATIVE 10/25/2020    GLUCOSEU NEGATIVE 10/25/2020    KETUA NEGATIVE 10/25/2020    AMORPHOUS NOT REPORTED 10/25/2020     Urine Sodium:     Lab Results   Component Value Date    GENNARO 85 10/25/2020     Urine Creatinine:     Lab Results   Component Value Date    LABCREA 24.3 10/25/2020     Urine Eosinophils:  No components found for: UEOS    Radiology:     CXR:     Assessment:     1. Acute kidney injury likely secondary to hypotension and overdiuresis, contrast nephropathy versus atheroembolic disease post-cath, cardiac cath was done on 10/29/2020. Patient was on Demadex which is currently on hold.   2. 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 3.  Status post  donor kidney transplant in 2012 at [facility] follows up with Dr. Vera Orellana  3. NSTEMI status post multivessel coronary disease, will require CABG. 4.  Diabetes type 2. HgB A1C was 8-9  5. History of  donor renal transplant done in  at [facility]. 6.  Essential hypertension. 7.  Decompensated heart failure. S/p right pleural effusion  8. 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 Preserved LV function. On Heparin drip  10. ESBL Klebsiella Pneumonia: ID started Meropenem     Plan:   1. Continue current immunosuppressants, currently on tacrolimus 3 mg twice a day,  mg twice a day and prednisone 5 mg daily. Prograf level <2.0 on 10/26. Await repeat. 2. One dose of Lasix 40mg IV today  3. Daily Labs  4. Has an indwelling Santos from before  5. Fluid restriction 1.5 L a day  6. Will continue to follow. 7.  Check complement levels to see if he has had any atheroembolic disease leading to increase in creatinine    Nutrition   Please ensure that patient is on a renal diet/TF. Avoid nephrotoxic drugs/contrast exposure. We will continue to follow along with you. Attending Physician Statement  I have discussed the care of [facility], including pertinent history and exam findings with the resident/fellow. I have reviewed the key elements of all parts of the encounter with the resident/fellow. I have seen and examined the patient with the resident/fellow. I agree with the assessment and plan and status of the problem list as documented.       .  Electronically signed by Charmayne Roys, MD on 2020 at 6:25 PM

## 2020-11-01 NOTE — PROGRESS NOTES
Chart reviewed. Patient has no conduit besides LIMA. Surgical option may not be possible. Will discuss with Dr. Rebecca Maynard tomorrow morning.     Lamont Early

## 2020-11-01 NOTE — PROGRESS NOTES
[facility]  Progress Note    Date: 2020    Hospital day :8     Impression:   1. Acute on chronic hypercapnic respiratory failure  2. Decompensated diastolic heart failure  3. Non-ST elevation myocardial infarction status post cardiac catheterization showing multivessel coronary artery disease and will require CABG  4. 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 History of  donor renal transplant  on immunosuppressive therapy  6. Multidrug-resistant/ESBL Klebsiella pneumonia bacteriuria 10-25-    Recommendations   · The patient does report some mild dysuria but the patient has less than 1000 colony-forming units of the Klebsiella pneumonia isolated in the urine which mostly represents a colonizer rather than pathogens. · The only concern here is that the patient may be scheduled for coronary artery bypass grafting surgery and in light of that he will likely need indwelling Santos catheter and I would therefore recommend antibiotic coverage  · I will start the patient on meropenem in anticipation of the coronary artery bypass grafting surgery  · I will follow his progress and adjust therapy accordingly    Chief complaint/reason for consultation:   ESBL Klebsiella urinary tract infection    History of Present Illness: Dee Ramsey is a 66y.o.-year-old male who was initially admitted on 10/24/2020. INITIAL HISTORY:    Dee Ramsey has a history of renal failure status post transplant 2012 on immunosuppressive therapy, congestive heart failure, COPD on 2 L of home oxygen, pulmonary hypertension, diabetes mellitus type 2, hypertension, atrial fibrillation who was recently hospitalized at [facility] with pneumonia and CHF requiring intubation but was subsequently extubated and discharged to an extended care facility.     The patient was brought into the hospital due to shortness of breath and work-up showed an elevated troponin and EKG showed atrial flutter with 3-1 conduction. He was admitted with acute on chronic respiratory failure related to diastolic heart failure, but there was concern for toxic metabolic encephalopathy, questionable UTI versus infected sacral ulcer and he was started on broad-spectrum antimicrobial therapy. Patient was seen by the cardiology service and underwent a catheterization for acute coronary syndrome and was found to have multivessel coronary artery disease with some lung lesions that were heavily calcified and CT surgery was consulted for coronary artery bypass grafting    The urine culture did end up growing ESBL Klebsiella pneumonia and I been asked to evaluate and help with antibiotic choice    The patient at this point in time does have multiple complaints reporting being tired of lying in bed and is reporting pain in his lower back and gluteal area. He does not report any subjective fever or chills. He reports that his breathing is overall okay and his shortness of breath is improved. He does report some mild dysuria but no urinary frequency. CURRENT EVALUATION :11/1/2020    Afebrile  VS stable  Remains of atrial flutter. Rate controlled    Comfortable on 4 L 02 per NC  RR 16-->20  02 sat 100    On meropenem because of ESBL + Klebsiella in urine in anticipation of possible CABG. [facility] currently recommending repeat cath and stent placement over CABG. Cardiology and cardiovascular surgery to reach a consensus as to how to proceed further. Patient diuresing  Dyspnea has improved. He remains fairly comfortable    Discussed with patient, RN. I have personally reviewed the past medical history, past surgical history, medications, social history, and family history, and I have updated the database accordingly.   Past Medical History:     Past Medical History:   Diagnosis Date    Blind one eye     Right    CAD (coronary artery disease)     Chronic kidney disease     Diabetes mellitus ([facility] [address]     Hypertension      Past Surgical  History:     Past Surgical History:   Procedure Laterality Date    CARDIAC CATHETERIZATION  10/27/2020    Multi-vessel Coronary Artery Disease. Long lesions, heavily calcified / RCA: Single stenosis. with patent distal stent  /   CT CONSULT    CORONARY ANGIOPLASTY WITH STENT PLACEMENT      RCA DISTAL    HERNIA REPAIR      KIDNEY TRANSPLANT N/A     pt thinks it was the right side    LEG AMPUTATION BELOW KNEE Right 08/2018    TUMOR EXCISION       Medications:      insulin lispro  2 Units Subcutaneous TID WC    LORazepam  0.5 mg Intravenous Once    meropenem  1 g Intravenous Q8H    metoprolol  2.5 mg Intravenous Once    insulin glargine  15 Units Subcutaneous Nightly    carvedilol  6.25 mg Oral BID WC    [Held by provider] torsemide  40 mg Oral Daily    sodium chloride flush  10 mL Intravenous 2 times per day    aspirin  81 mg Oral Daily    amLODIPine  10 mg Oral Nightly    tacrolimus  3 mg Oral BID    predniSONE  5 mg Oral Daily    mycophenolate  540 mg Oral BID    insulin lispro  0-6 Units Subcutaneous TID WC    insulin lispro  0-3 Units Subcutaneous Nightly    atorvastatin  40 mg Oral Nightly    tamsulosin  0.4 mg Oral Daily    sennosides-docusate sodium  1 tablet Oral Daily    sodium chloride flush  10 mL Intravenous 2 times per day     Social History:     Social History     Socioeconomic History    Marital status:      Spouse name: Not on file    Number of children: Not on file    Years of education: Not on file    Highest education level: Not on file   Occupational History    Not on file   Social Needs    Financial resource strain: Not on file    Food insecurity     Worry: Not on file     Inability: Not on file    Transportation needs     Medical: Not on file     Non-medical: Not on file   Tobacco Use    Smoking status: Never Smoker    Smokeless tobacco: Never Used   Substance and Sexual Activity    Alcohol use: No    Drug use: No    Sexual activity: Not on file   Lifestyle    Physical activity     Days per week: Not on file     Minutes per session: Not on file    Stress: Not on file   Relationships    Social connections     Talks on phone: Not on file     Gets together: Not on file     Attends Evangelical service: Not on file     Active member of club or organization: Not on file     Attends meetings of clubs or organizations: Not on file     Relationship status: Not on file    Intimate partner violence     Fear of current or ex partner: Not on file     Emotionally abused: Not on file     Physically abused: Not on file     Forced sexual activity: Not on file   Other Topics Concern    Not on file   Social History Narrative    Not on file     Family History:   History reviewed. No pertinent family history. Allergies:   Contrast  [iodides]     Review of Systems:   General: No fevers or chills. Eyes: No double vision or blurry vision. ENT: No sore throat or runny nose. Cardiovascular: No chest pain or palpitations. Lung: He does have some mild shortness of breath but overall is improved. Significant cough  Abdomen: No nausea, vomiting, diarrhea, or abdominal pain. Genitourinary: No urinary frequency but does have some mild dysuria  Musculoskeletal: He reports some mild back pain and gluteal pain. Hematologic: No bleeding or bruising. Neurologic: No headache, weakness, numbness, or tingling.     Physical Examination :   BP (!) 116/52   Pulse 69   Temp 98.4 °F (36.9 °C) (Oral)   Resp 20   Ht 6' 1\" (1.854 m)   Wt 225 lb 6.4 oz (102.2 kg)   SpO2 100%   BMI 29.74 kg/m²     Temperature Range: Temp: 98.4 °F (36.9 °C) Temp  Av.4 °F (36.9 °C)  Min: 98.3 °F (36.8 °C)  Max: 98.6 °F (37 °C)  General Appearance: alert and oriented to person, place and time, well-developed and well-nourished, in no acute distress and he has a sitter in the room with him as at times he has been agitated but it is my understanding he is actually doing very well tonight. Head: Normocephalic, without obvious abnormality, atraumatic  Eyes: Pupils equal, round, reactive, to light and accommodation; extraocular movements intact; sclera anicteric; conjunctivae pink  ENT: Oropharynx clear, without erythema, exudate, or thrush. Neck: Supple, without lymphadenopathy. Pulmonary/Chest: Few scattered rales appreciated bilaterally  Cardiovascular: There is a soft systolic ejection murmur appreciated   Abdomen: soft, non-tender, non-distended, normal bowel sounds, no masses or organomegaly  Extremities: Status post bilateral below the knee amputations  Neurologic: No gross sensory or motor deficits. Skin: He has a stage III sacral ulceration which is clean not currently infected    Medical Decision Making:   I have independently reviewed/ordered the following labs:  CBC with Differential:   Recent Labs     10/31/20  0706 11/01/20  0751   WBC 4.3 4.3   HGB 8.4* 8.2*   HCT 30.4* 27.9*   PLT See Reflexed IPF Result See Reflexed IPF Result   LYMPHOPCT 10* 16*   MONOPCT 8* 10     BMP:   Recent Labs     10/30/20  0052  10/31/20  0706 11/01/20  0751   NA  --    < > 138 138   K 4.2   < > 4.8 3.9   CL  --    < > 104 103   CO2  --    < > 25 27   BUN  --    < > 46* 47*   CREATININE  --    < > 1.78* 2.12*   MG 2.1  --   --   --     < > = values in this interval not displayed. Hepatic Function Panel: No results for input(s): PROT, LABALBU, BILIDIR, IBILI, BILITOT, ALKPHOS, ALT, AST in the last 72 hours. No results found for: PROCAL    No results found for: CRP  No results found for: FERRITIN  No results found for: FIBRINOGEN  No results found for: DDIMER  No results found for: LDH    No results found for: SEDRATE    Lab Results   Component Value Date    COVID19 Not Detected 10/24/2020     No results found for requested labs within last 30 days.        Imaging Studies:   CT OF THE CHEST WITHOUT CONTRAST 10/29/2020 10:03 am FINDINGS:  Moderate right greater than left layering simple pleural effusions with associated atelectasis. As this occurs in the setting of mild pulmonary edema and dependent subcutaneous edema, this presumably relates to positive fluid balance. Findings suggestive of underlying pulmonary cystic disease with numerous randomly distributed thin walled cysts throughout the aerated portions of the lungs bilaterally. Correlate with prior clinical history of such. Cardiomegaly with coronary artery, aortic valvular, and papillary muscle calcifications and a small pericardial effusion. Cholelithiasis. 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 ONE XRAY VIEW OF THE CHEST 10/24/2020 1:30 pm   FINDINGS:   Cardiomegaly with hydrostatic edema and small layering pleural effusions in keeping with acute congestive heart failure. Vl Dup Carotid Bilateral Result Date: 10/28/2020  Summary     The study demonstrates:   Right:  Internal carotid artery has a mild, <50% stenosis based on velocities. The vertebral artery is patent with antegrade flow. Left:  Internal carotid artery has a mild, <50% stenosis based on velocities. The vertebral artery is patent with antegrade flow. Electronically signed by Trudi Lopez(Interpreting  physician) on 10/28/2020 01:51 AM     Vl Vein Mapping Lower Bilateral Result Date: 10/28/2020  Findings are:   Right:  Limited study, in the visualized areas no superficial or deep vein  thrombosis was identified. Vein sizes are listed in the table below. Left:  Limited study, in the visualized areas no superficial or deep vein  thrombosis was identified. Vein sizes are listed in the table below. Electronically signed by Trudi Lopez(Interpreting  physician) on 10/28/2020 01:53 AM     Cultures:     Culture, Urine [[id number]]  (Abnormal)   Collected: 10/25/20 1814    Order Status: Completed  Specimen: Urine, straight catheter  Updated: 10/28/20 1410     Specimen Description  . URINE,STRAIGHT CATHETER Special Requests  NOT REPORTED     Culture  KLEBSIELLA PNEUMONIAE <[id number] CFU/ML THIS ORGANISM IS AN EXTENDED-SPECTRUM BETA-LACTAMASE  AND RESISTANCE TO THERAPY WITH PENICILLINS, CEPHALOSPORINS AND AZTREONAM IS EXPECTED.  THESE ORGANISMS GENERALLY REMAIN SUSCEPTIBLE TO CARBAPENEMS.  CONSIDER ID CONSULTATION. Abnormal     Klebsiella pneumoniae (1)     Antibiotic  Interpretation  JASMEET  Status     amikacin    Final      NOT REPORTED    ampicillin  Resistant   Final      >=32   RESISTANT    ampicillin-sulbactam    Final      NOT REPORTED    aztreonam  Resistant   Final      32   RESISTANT    ceFAZolin  Resistant   Final      >=64   RESISTANT    ceFAZolin  Resistant  Cefazolin sensitivity results can be used to predict the effectiveness of oral cephalosporins (eg. Cephalexin) in uncomplicated Urinary Tract Infections due to E. coli, K. pneumoniae, and P. mirabilis  Final     cefepime  Resistant   Final      >=64   RESISTANT    cefTRIAXone  Resistant   Final      >=64   RESISTANT    ciprofloxacin  Resistant   Final      >=4   RESISTANT    ertapenem    Final      NOT REPORTED    Confirmatory Extended Spectrum Beta-Lactamase  Positive  POSITIVE  Final     gentamicin  Sensitive   Final      <=1   SUSCEPTIBLE    meropenem  Sensitive   Final      <=0.25   SUSCEPTIBLE    nitrofurantoin  Intermediate   Final      64   INTERMEDIATE    tigecycline    Final      NOT REPORTED    tobramycin  Intermediate   Final      8   INTERMEDIATE    trimethoprim-sulfamethoxazole  Sensitive   Final      <=20   SUSCEPTIBLE    piperacillin-tazobactam  Resistant   Final      16   RESISTANT            Thank you for allowing us to participate in the care of this patient. Please call with questions.     Electronically signed by Horace Cano MD on 11/1/2020 at 9:35 AM      [facility] [address] [place] [address] messaging  OFFICE: [phone]      This note is created with the assistance of a speech recognition program. While intending to generate a document that actually reflects the content of the visit, the document can still have some errors including those of syntax and sound a like substitutions which may escape proof reading. In such instances, actual meaning can be extrapolated by contextual diversion.

## 2020-11-02 NOTE — PROGRESS NOTES
Physical Therapy  DATE: 2020    NAME: Jennie Mckinnon  MRN: [id number]   : 1945    Patient not seen this date for Physical Therapy due to:  [] Blood transfusion in progress  [] Hemodialysis  []  Patient Declined  [] Spine Precautions   [] Strict Bedrest  [] Surgery/ Procedure  [] Testing      [x] Other  -  Code Blue. Will check on pt tomorrow. [] PT being discontinued at this time. Patient independent. No further needs. [] PT being discontinued at this time as the patient has been transferred to palliative care. No further needs.     Laura Blakely, PTA

## 2020-11-02 NOTE — PLAN OF CARE
Problem: Pain:  Goal: Pain level will decrease  Description: Pain level will decrease  Outcome: Ongoing  Goal: Control of acute pain  Description: Control of acute pain  Outcome: Ongoing  Goal: Control of chronic pain  Description: Control of chronic pain  Outcome: Ongoing     Problem: Skin Integrity:  Goal: Will show no infection signs and symptoms  Description: Will show no infection signs and symptoms  Outcome: Ongoing  Goal: Absence of new skin breakdown  Description: Absence of new skin breakdown  Outcome: Ongoing     Problem: Falls - Risk of:  Goal: Will remain free from falls  Description: Will remain free from falls  Outcome: Ongoing  Goal: Absence of physical injury  Description: Absence of physical injury  Outcome: Ongoing     Problem: Nutrition  Goal: Optimal nutrition therapy  Description: Nutrition Problem #1: Increased nutrient needs  Intervention: Food and/or Nutrient Delivery: Modify Current Diet, Start Oral Nutrition Supplement  Nutritional Goals: Pt to consume >75% of est'd daily needs via PO     Outcome: Ongoing     Problem: Musculor/Skeletal Functional Status  Goal: Highest potential functional level  Outcome: Ongoing     Problem: Cardiac:  Goal: Ability to maintain an adequate cardiac output will improve  Description: Ability to maintain an adequate cardiac output will improve  Outcome: Ongoing  Goal: Hemodynamic stability will improve  Description: Hemodynamic stability will improve  Outcome: Ongoing     Problem: Respiratory:  Goal: Respiratory status will improve  Description: Respiratory status will improve  Outcome: Ongoing

## 2020-11-02 NOTE — CONSULTS
NEUROLOGY INPATIENT CONSULT NOTE      11/2/2020         I had the pleasure of seeing your patient as a neurology consultation. As you would recall Hellen Saint is a  66 y.o. male with H/O renal transplant, CHF, COPD on home oxygen, pulmonary artery hypertension, A. fib on Eliquis, HTN, DM, bilateral BKA who was admitted on 10/24/2020 with Shortness of breath [R06.02]  Shortness of breath [R06.02]. Per the records the patient was recently discharged from [facility] where he was admitted for respiratory failure due to CHF and pneumonia. He discharged to SNF but presented back to the hospital with complaints of shortness of breath. Cardiology was consulted for elevated troponins in the setting of acute on chronic heart failure and the patient underwent a cardiac cath showing multivessel disease. CTS was consulted who was evaluating the patient for potential cardiac cath. Earlier today the patient was found to be unresponsive and in PEA. CODE BHARGAV was called with a downtime of approximately 14 minutes. She was taken back for repeat cardiac cath where IABP was implanted. Post arrest the patient was noted to have seizure-like activity for which neurology is consulted. No current facility-administered medications on file prior to encounter.       Current Outpatient Medications on File Prior to Encounter   Medication Sig Dispense Refill    Pollen Extracts (PROSTAT PO) Take by mouth      acetaminophen (TYLENOL) 325 MG tablet Take 650 mg by mouth every 6 hours as needed for Pain      apixaban (ELIQUIS) 5 MG TABS tablet Take by mouth 2 times daily      atorvastatin (LIPITOR) 40 MG tablet Take 40 mg by mouth nightly      vitamin D (CHOLECALCIFEROL) 125 MCG (5000 UT) CAPS capsule Take 5,000 Units by mouth daily      docusate sodium (COLACE) 100 MG capsule Take 100 mg by mouth 3 times daily      magnesium oxide (MAG-OX) 400 MG tablet Take 400 mg by mouth daily      mycophenolate (MYFORTIC) 180 MG DR tablet Take 540 mg by mouth 2 times daily      ondansetron (ZOFRAN) 4 MG tablet Take 4 mg by mouth every 8 hours as needed for Nausea or Vomiting      POTASSIUM CHLORIDE ER PO Take 20 mEq by mouth daily      b complex vitamins capsule Take 1 capsule by mouth daily      tacrolimus (PROGRAF) 1 MG capsule Take 3 mg by mouth 2 times daily      aspirin (ASPIRIN 81) 81 MG EC tablet Take by mouth daily       furosemide (LASIX) 40 MG tablet Take 40 mg by mouth 2 times daily      dorzolamide (TRUSOPT) 2 % ophthalmic solution Place 1 drop into the right eye 2 times daily       albuterol (PROVENTIL) (2.5 MG/3ML) 0.083% nebulizer solution Take 2.5 mg by nebulization every 4 hours as needed for Wheezing       polyethylene glycol (GLYCOLAX) 17 g packet Take 17 g by mouth daily       metoprolol tartrate (LOPRESSOR) 25 MG tablet Take 25 mg by mouth 2 times daily      sennosides-docusate sodium (SENOKOT-S) 8.6-50 MG tablet Take 2 tablets by mouth daily       predniSONE (DELTASONE) 5 MG tablet Take 5 mg by mouth daily      tamsulosin (FLOMAX) 0.4 MG capsule Take 0.4 mg by mouth nightly       amLODIPine (NORVASC) 5 MG tablet Take 10 mg by mouth daily       insulin glargine (BASAGLAR KWIKPEN) 100 UNIT/ML injection pen Inject 30 Units into the skin daily       bisacodyl (DULCOLAX) 10 MG suppository Place 10 mg rectally daily      bumetanide (BUMEX) 1 MG tablet Take 2 mg by mouth 2 times daily      colchicine (COLCRYS) 0.6 MG tablet Take 0.6 mg by mouth daily      gabapentin (NEURONTIN) 300 MG capsule Take 300 mg by mouth 3 times daily. Leti Gourd insulin lispro (HUMALOG) 100 UNIT/ML injection vial Inject into the skin 3 times daily (before meals)         Allergies: Cristiana Suguillerminatami is allergic to contrast  [iodides].     Past Medical History:   Diagnosis Date    Blind one eye     Right    CAD (coronary artery disease)     Chronic kidney disease     Diabetes mellitus ([facility] [address]     Hypertension        Past Surgical History:   Procedure Laterality Date    CARDIAC CATHETERIZATION  10/27/2020    Multi-vessel Coronary Artery Disease. Long lesions, heavily calcified / RCA: Single stenosis. with patent distal stent  /   CT CONSULT    CORONARY ANGIOPLASTY WITH STENT PLACEMENT      RCA DISTAL    HERNIA REPAIR      KIDNEY TRANSPLANT N/A     pt thinks it was the right side    LEG AMPUTATION BELOW KNEE Right 08/2018    TUMOR EXCISION         Social History: Don De La Cruz  reports that he has never smoked. He has never used smokeless tobacco. He reports that he does not drink alcohol or use drugs. History reviewed. No pertinent family history.     ROS:  Cannot complete due to patient condition                                            Medications:     furosemide  80 mg Intravenous BID    insulin lispro  2 Units Subcutaneous TID WC    meropenem  1 g Intravenous Q8H    insulin glargine  15 Units Subcutaneous Nightly    carvedilol  6.25 mg Oral BID WC    [Held by provider] torsemide  40 mg Oral Daily    sodium chloride flush  10 mL Intravenous 2 times per day    aspirin  81 mg Oral Daily    amLODIPine  10 mg Oral Nightly    tacrolimus  3 mg Oral BID    predniSONE  5 mg Oral Daily    mycophenolate  540 mg Oral BID    insulin lispro  0-6 Units Subcutaneous TID WC    insulin lispro  0-3 Units Subcutaneous Nightly    atorvastatin  40 mg Oral Nightly    tamsulosin  0.4 mg Oral Daily    sennosides-docusate sodium  1 tablet Oral Daily    sodium chloride flush  10 mL Intravenous 2 times per day     PRN Meds include: fentanNYL, sodium chloride flush, acetaminophen, labetalol, sodium chloride flush, acetaminophen **OR** acetaminophen, polyethylene glycol, promethazine **OR** ondansetron, heparin (porcine), heparin (porcine), glucose, dextrose, glucagon (rDNA), dextrose    Objective:   BP (!) 147/69   Pulse 74   Temp 96.6 °F (35.9 °C) (Core)   Resp 17   Ht 6' 1\" (1.854 m)   Wt 229 lb 4.8 oz (104 kg)   SpO2 99%   BMI 30.25 kg/m²     Blood pressure range: Systolic (02VIE), AVZ:483 , Min:126 , AXD:070   ; Diastolic (52CDG), UPR:40, Min:58, Max:116      General examination:   Head: Normocephalic, atraumatic  Eyes: Positive OCR   Lungs: Intubated  ENT: Normal external ear canals, no sinus tenderness  Heart: Regular rate rhythm  Abdomen: No masses, tenderness  Extremities: No cyanosis or edema, 2+ pulses  Skin: Intact, normal skin color      Limited Neuro Exam:  Patient remains intubated, not currently on sedation  Does not open eyes  Left pupil nonreactive, right pupil unable to be assessed due to prior eye injury, weak corneal to the left, absent corneal to the right, + oculocephalic, + gag  Flaccid limbs  Does not withdrawal to pain  Bilateral BKA        Data:    Lab Results:   CBC:   Recent Labs     11/01/20  0751 11/02/20  0630 11/02/20  1509   WBC 4.3 4.3 16.7*   HGB 8.2* 8.3* 8.4*   PLT See Reflexed IPF Result See Reflexed IPF Result See Reflexed IPF Result     BMP:    Recent Labs     11/01/20  0751 11/02/20  0630 11/02/20  1502 11/02/20  1509    136  --  140   K 3.9 4.2  --  4.2    99  --  102   CO2 27 27  --  26   BUN 47* 49*  --  52*   CREATININE 2.12* 2.08* 2.35* 1.90*   GLUCOSE 130* 199*  --  290*         Lab Results   Component Value Date    CHOL 166 05/11/2017    LDLCHOLESTEROL 97 05/11/2017    HDL 43 05/11/2017    TRIG 132 05/11/2017    ALT 22 10/24/2020    AST 14 10/24/2020    INR 1.3 10/24/2020                   Impression:  -Seizure-like activity post PEA arrest with a downtime of 14 minutes    Plan:  -Stat EEG  -Stat CT head  -Load with IV Keppra 1 g now x1, continue maintenance dosing of Keppra 250 mg twice daily  -Will follow with you. Thank you for the consult. Please note that this note was generated using a voice recognition dictation software. Although every effort was made to ensure the accuracy of this automated transcription, some errors in transcription may have occurred.

## 2020-11-02 NOTE — PROGRESS NOTES
Charting intubations and reintubations    ETT Size (e.g. 7.5) 8.0  ETT Placement (e.g. 23 cm at lips) 24  ETT secured with (commercial device name) Aurelia    Tube placement verified by:  Auscultation (yes/no) yes  Positive color change on end tidal CO2 detector (yes/no) yes  CXR (yes/no) yes    Reason for intubation (jot a quick note/phrase e.g. Impending respiratory failure) Cardiac Arrest    If difficult airway, was red tape placed (yes/no) yes  If code situation, was rescue pod used? (yes/no) no    -- Notify charge therapist regarding all intubations, extubations, reintubations and self extubations    KATHERINE Kevin  2:32 PM

## 2020-11-02 NOTE — CARE COORDINATION
Based on patients CODE event today, poor conduits, previous needed clearance of UTI/cystitis by ID, and neurology  Clearance, we advise patient for medical management or high risk PCI by cardiology. At this time we will sign off.  Please reach out to CTS for any questions or concerns

## 2020-11-02 NOTE — PROGRESS NOTES
(MYFORTIC) 180 MG DR tablet, Take 540 mg by mouth 2 times daily  ondansetron (ZOFRAN) 4 MG tablet, Take 4 mg by mouth every 8 hours as needed for Nausea or Vomiting  POTASSIUM CHLORIDE ER PO, Take 20 mEq by mouth daily  b complex vitamins capsule, Take 1 capsule by mouth daily  tacrolimus (PROGRAF) 1 MG capsule, Take 3 mg by mouth 2 times daily  aspirin (ASPIRIN 81) 81 MG EC tablet, Take by mouth daily   furosemide (LASIX) 40 MG tablet, Take 40 mg by mouth 2 times daily  [] ertapenem (INVANZ) 1 GM injection, Inject 1,000 mg into the muscle every 24 hours  dorzolamide (TRUSOPT) 2 % ophthalmic solution, Place 1 drop into the right eye 2 times daily   albuterol (PROVENTIL) (2.5 MG/3ML) 0.083% nebulizer solution, Take 2.5 mg by nebulization every 4 hours as needed for Wheezing   polyethylene glycol (GLYCOLAX) 17 g packet, Take 17 g by mouth daily   metoprolol tartrate (LOPRESSOR) 25 MG tablet, Take 25 mg by mouth 2 times daily  sennosides-docusate sodium (SENOKOT-S) 8.6-50 MG tablet, Take 2 tablets by mouth daily   predniSONE (DELTASONE) 5 MG tablet, Take 5 mg by mouth daily  tamsulosin (FLOMAX) 0.4 MG capsule, Take 0.4 mg by mouth nightly   [DISCONTINUED] apixaban (ELIQUIS) 2.5 MG TABS tablet, Take by mouth 2 times daily  amLODIPine (NORVASC) 5 MG tablet, Take 10 mg by mouth daily   insulin glargine (BASAGLAR KWIKPEN) 100 UNIT/ML injection pen, Inject 30 Units into the skin daily   bisacodyl (DULCOLAX) 10 MG suppository, Place 10 mg rectally daily  bumetanide (BUMEX) 1 MG tablet, Take 2 mg by mouth 2 times daily  colchicine (COLCRYS) 0.6 MG tablet, Take 0.6 mg by mouth daily  gabapentin (NEURONTIN) 300 MG capsule, Take 300 mg by mouth 3 times daily. .  insulin lispro (HUMALOG) 100 UNIT/ML injection vial, Inject into the skin 3 times daily (before meals)  [DISCONTINUED] dorzolamide-timolol (COSOPT) 22.3-6.8 MG/ML ophthalmic solution,   [DISCONTINUED] acetaminophen (TYLENOL) 500 MG tablet, Take 500 mg by mouth every 6 hours as needed for Pain  [DISCONTINUED] aspirin 81 MG tablet, Take 81 mg by mouth daily  [DISCONTINUED] atorvastatin (LIPITOR) 20 MG tablet, Take 40 mg by mouth daily   [DISCONTINUED] insulin lispro (HUMALOG) 100 UNIT/ML injection vial, Inject 15 Units into the skin 3 times daily (before meals)     Current Medications:     Scheduled Meds:    furosemide  80 mg Intravenous BID    insulin lispro  2 Units Subcutaneous TID WC    meropenem  1 g Intravenous Q8H    insulin glargine  15 Units Subcutaneous Nightly    carvedilol  6.25 mg Oral BID WC    [Held by provider] torsemide  40 mg Oral Daily    sodium chloride flush  10 mL Intravenous 2 times per day    aspirin  81 mg Oral Daily    amLODIPine  10 mg Oral Nightly    tacrolimus  3 mg Oral BID    predniSONE  5 mg Oral Daily    mycophenolate  540 mg Oral BID    insulin lispro  0-6 Units Subcutaneous TID WC    insulin lispro  0-3 Units Subcutaneous Nightly    atorvastatin  40 mg Oral Nightly    tamsulosin  0.4 mg Oral Daily    sennosides-docusate sodium  1 tablet Oral Daily    sodium chloride flush  10 mL Intravenous 2 times per day     Continuous Infusions:    EPINEPHrine infusion      heparin (PORCINE) Infusion 14 Units/kg/hr (20 1107)    dextrose       PRN Meds:  sodium chloride flush, acetaminophen, labetalol, sodium chloride flush, acetaminophen **OR** acetaminophen, polyethylene glycol, promethazine **OR** ondansetron, heparin (porcine), heparin (porcine), glucose, dextrose, glucagon (rDNA), dextrose    Input/Output:       I/O last 3 completed shifts: In: 281 [P.O.:420;  I.V.:387; IV Piggyback:100]  Out: 1220 [Urine:1220]    Vital Signs:   Temperature:  Temp: 96.6 °F (35.9 °C)  TMax:   Temp (24hrs), Av.6 °F (36.4 °C), Min:96.6 °F (35.9 °C), Max:98.2 °F (36.8 °C)    Respirations:  Resp: 17  Pulse:   Pulse: 68  BP:    BP: (!) 147/69  BP Range: Systolic (12TIL), VEE:480 , Min:126 , YRA:899       Diastolic (85SVF), TEM:52, Min:58, Max:116      Physical Examination:     General:  Sedated on ventilator. FiO2 30% with a PEEP of 10  HEENT:  Atraumatic, normocephalic, no throat congestion, moist mucosa. Eyes:   Pupils equal, round and reactive to light, pallor, no icterus. Neck:   No JVD, no thyromegaly, no lymphadenopathy. Chest:  Air entry fair bilaterally, no added sounds  Cardiac: S1 S2 RR no murmurs  Abdomen:  Soft, non-tender, no masses or organomegally appreciable, BS sluggish. :   No suprapubic or flank tenderness. Neuro:  Sedated on ventilator. Skin:   No rashes, good skin turgor. Extremities:  No edema, palpable peripheral pulses, no cyanosis, no calf tenderness. Labs:       Recent Labs     10/31/20  0706 11/01/20  0751 11/02/20  0630   WBC 4.3 4.3 4.3   RBC 2.89* 2.82* 2.81*   HGB 8.4* 8.2* 8.3*   HCT 30.4* 27.9* 26.9*   .2* 98.9 95.7   MCH 29.1 29.1 29.5   MCHC 27.6* 29.4 30.9   RDW 17.8* 17.6* 17.5*   PLT See Reflexed IPF Result See Reflexed IPF Result See Reflexed IPF Result   MPV NOT REPORTED NOT REPORTED NOT REPORTED      BMP:   Recent Labs     10/31/20  0706 11/01/20  0751 11/02/20  0630    138 136   K 4.8 3.9 4.2    103 99   CO2 25 27 27   BUN 46* 47* 49*   CREATININE 1.78* 2.12* 2.08*   GLUCOSE 107* 130* 199*   CALCIUM 9.1 9.2 9.3      Phosphorus:   No results for input(s): PHOS in the last 72 hours. Magnesium:  No results for input(s): MG in the last 72 hours. Albumin:  No results for input(s): LABALBU in the last 72 hours.   BNP:    No results found for: BNP  KIRK:    No results found for: KIRK  SPEP:  Lab Results   Component Value Date    PROT 7.2 10/24/2020    PROT 7.2 05/11/2017     UPEP:   No results found for: LABPE  C3:     Lab Results   Component Value Date    C3 91 11/01/2020     C4:     Lab Results   Component Value Date    C4 25 11/01/2020     MPO ANCA:   No results found for: MPO  PR3 ANCA:   No results found for: PR3  Anti-GBM:   No results found for: GBMABIGG  Hep BsAg:       No results found for: HEPBSAG  Hep C AB:        No results found for: HEPCAB    Urinalysis/Chemistries:      Lab Results   Component Value Date    NITRU NEGATIVE 10/25/2020    COLORU YELLOW 10/25/2020    PHUR 6.5 10/25/2020    WBCUA TOO NUMEROUS TO COUNT 10/25/2020    RBCUA TOO NUMEROUS TO COUNT 10/25/2020    MUCUS NOT REPORTED 10/25/2020    TRICHOMONAS NOT REPORTED 10/25/2020    YEAST NOT REPORTED 10/25/2020    BACTERIA FEW 10/25/2020    SPECGRAV 1.009 10/25/2020    LEUKOCYTESUR LARGE 10/25/2020    UROBILINOGEN Normal 10/25/2020    BILIRUBINUR NEGATIVE 10/25/2020    GLUCOSEU NEGATIVE 10/25/2020    KETUA NEGATIVE 10/25/2020    AMORPHOUS NOT REPORTED 10/25/2020     Urine Sodium:     Lab Results   Component Value Date    GENNARO 85 10/25/2020     Urine Potassium:  No results found for: KUR  Urine Chloride:  No results found for: CLUR  Urine Osmolarity: No results found for: OSMOU  Urine Protein:   No results found for: TPU  Urine Creatinine:     Lab Results   Component Value Date    LABCREA 24.3 10/25/2020     Urine Eosinophils:  No components found for: UEOS    Radiology:     CXR:     Assessment:     1. Acute allograft injury: Secondary to hypotension, overdiuresis, contrast nephropathy versus atheroembolic disease post cardiac catheterization which was done on 10/29/2020, creatinine peaked to 2.1, was resolving up until this morning when he had an episode of cardiorespiratory arrest, since then his urine output has declined likely from ischemic ATN expect renal function to worsen  2. Status post cardiorespiratory arrest likely from ongoing ischemia, had PEA, ACLS initiated, ROSC established and 40 minutes  3. Repeat cardiac catheterization did not show any new lesions  4. Multivessel coronary disease awaiting cardiothoracic surgery input for further intervention  5. Cardiogenic shock post arrest on epinephrine  6. Fluid overload from cardiogenic shock  7.   Respiratory failure from fluid overload and aspiration

## 2020-11-02 NOTE — OP NOTE
[facility]    CARDIAC CATHETERIZATION    Date:   11/2/2020  Patient name:  Dee Ramsey  Date of admission:  10/24/2020  4:14 PM  MRN:   [id number]  YOB: 1945    Operators:  Primary:   Fahad Luther MD (Attending Physician)        Procedure performed:     [x] Left Heart Catheterization. [] Graft Angiography.  [] Left Ventriculography. [] Right Heart Catheterization. [x] Coronary Angiography. [] Aortic Valve Studies. [] PCI:      [] Other:       Pre Procedure Conscious Sedation Data:  ASA Class:    [] I [x] II [] III [] IV    Mallampati Class:  [] I [x] II [] III [] IV      Indication:  [] STEMI      [] + Stress test  [] ACS      [] + EKG Changes  [] Non Q MI       [] Significant Risk Factors  [] Recurrent Angina             [] Diabetes Mellitus    [] New LBBB      [x] Other. Cardiac arrest   [] CHF / Low EF changes     [] Abnormal CTA / Ca Score      Procedure:  Access:  [x] Femoral  [] Radial  artery       [x] Right  [] Left    Procedure: After informed consent was obtained with explanation of the risks and benefits, patient was brought to the cath lab. The access area was prepped and draped in sterile fashion. 1% lidocaine was used for local block. The artery was cannulated with 6  Fr sheath with brisk arterial blood return. The side port was frequently flushed and aspirated with normal saline. Estimated Blood Loss:  [x] Minimal < 25 cc [] Moderate 25-50 cc  [] >50 cc    Findings:    LMCA: Mild irregularities 10-20%.      LAD: Mid 80% stenosis   D1: Mid 80% stenosis     LCx: Proximal 80% stenosis   OM1: Mid 90% stenosis   OM2: Mid 90% stenosis     RCA: Patent stents with diffuse disease       Conclusions:     Multivessel CAD    Normal abdominal aortogram    Successful implantation of IABP for hemodynamic support       Recommendations:       IABP    CV surgery top determine CABG time / safety ____________________________________________________________________        Electronically signed on 11/2/2020 at 1:19 PM by:    Noé Kaplan MD  Fellow, [address] [place] [address]    I have reviewed the case / procedure with resident / fellow  I have examined the patient personally  Patient agree with treatment plan, correction innotes was made as appropriate, and discussed final arrangement based on  my evaluation and exam.    Risk and benefit of procedure if planned were explained in details. Procedure was performed by me, with all aspect of the procedure being done using standard protocol. Note was modified based on my own assessment and treatment.     Sheree Self MD  [facility]

## 2020-11-02 NOTE — PROGRESS NOTES
Patient with multivessel CAD (Cardiac cath 10/27/2020) was being evaluated by CT surgery for possible CABG. 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     Code BLUE was called in the afternoon as the patient was noted to be in PEA/asystole cardiac arrest. He was resuscitated and ROSC was achieved after 14 mins of CPR. He was also intubated by Anesthesia at bedside. A stat 12 lead EKG was obtained which revealed ST elevation in anterior leads. Prior cath results were reviewed, which revealed plaques rupture in mid LAD. The patient was taken for an emergent cardiac cath at that time which revealed the following (same as before) -    LMCA: Mild irregularities 10-20%. LAD: Mid 80% stenosis   D1: Mid 80% stenosis     LCx: Proximal 80% stenosis   OM1: Mid 90% stenosis   OM2: Mid 90% stenosis     RCA: Patent stents with diffuse disease     IABP was successfully placed and the patient was transferred to CVICU. Upon transfer to the CVICU, was paged by the nurse that he was having seizure like activity. Neurology was consulted stat and the patient was loaded with Keppra 1 gm and was started on Keppra 250 mg BID thereafter. Stat EEG and CT scan head were ordered, still pending. An arterial line was placed by vascular surgery and due to concern for poor I.v. access, a left femoral central line was placed. The patient had been on Epinephrine, was started on Levophed and Epi was to be weaned down after discussion with Cardiology. He was also started on versed for sedation. A bedside ECHO was performed, results pending. Await CT surgery recommendations regarding feasibility and timing of CABG. Discussed with the family about all of the above. She was contacted during the code and wished all efforts to be continued for resuscitation. She was also updated at bedside after Cardiac cath. Will continue to follow.      Hortencia Mcclelland MD      Department of Internal [address] [place] [address]         11/2/2020, 5:29 PM

## 2020-11-02 NOTE — PROCEDURES
PROCEDURE NOTE - CENTRAL VENOUS LINE PLACEMENT    PATIENT NAME: [facility] RECORD NO. [id number]  DATE: 11/2/2020  ATTENDING PHYSICIAN: Dr. Carolee Núñez DIAGNOSIS:  centrally administered medications  POSTOPERATIVE DIAGNOSIS:  Same  PROCEDURE PERFORMED:  Left Femoral Vein Central Line Insertion  PERFORMING PHYSICIAN: Alfred Gaffney DO  ANESTHESIA:  Local utilizing 1% lidocaine  ESTIMATED BLOOD LOSS:  Less than 25 ml  COMPLICATIONS:  None immediately appreciated. DISCUSSION:  Hellen Saint is a 66y.o.-year-old male who requires central IV access centrally administered medications. The history and physical examination were reviewed and confirmed. CONSENT: Unable to be obtained due to the emergent nature of this procedure. PROCEDURE:  A timeout was initiated by the bedside nurse and was confirmed by those present. The patient was placed in a supine position. The skin overlying the Left Femoral Vein was prepped with chlorhexadine and draped in sterile fashion. The vessel and surrounding anatomy was visualized using ultrasound. The introducer needle was inserted into the femoral vein returning dark red non pulsatile blood under ultrasound guidance, permanent ultrasound images saved. A guidewire was placed through the center of the needle with no resistance. A small incision made in the skin with a #11 scalpel blade. The dilator was inserted into the skin and vein over guidewire using Seldinger technique. The dilator was then removed and the 7 F 20 cm catheter was placed in the vein over the guidewire using Seldinger technique. The guidewire was then removed and all ports aspirated and flushed appropriately. The catheter then secured using silk suture and a temporary sterile dressing was applied. No immediate complication was evident. All sponge, instrument and needle counts were correct at the completion of the procedure.      Alfred Gaffney DO  6:54 PM, 11/2/20     I was present for the critical portions of the procedure.     Electronically signed by Sammie Sears MD

## 2020-11-02 NOTE — FLOWSHEET NOTE
SPIRITUAL CARE DEPARTMENT - [facility]  PROGRESS NOTE    Shift date: 11/02/20  Shift day: Monday   Shift # 1    Room # 2019/2019-01   Name: Johnny Davis            Age: 66 y.o. Gender: male                Referral: Code Blue    Admit Date & Time: 10/24/2020  4:14 PM    PATIENT/EVENT DESCRIPTION:  Johnny Davis is a 66 y.o. male. His heart stopped and he was coding. The patient was taken down to the cath lab. SPIRITUAL ASSESSMENT/INTERVENTION:  The  was a ministerial presence and was able to connect the patients wife with doctors on two different times over the phone. The patients wife was going to be coming to the hospital as soon as she can. SPIRITUAL CARE FOLLOW-UP PLAN:  Chaplains will remain available to offer spiritual and emotional support as needed.       Electronically signed by Katja Gilmore, on 11/2/2020 at 1:26 PM.  [facility]  [phone]       11/02/20 1324   Encounter Summary   Services provided to: Family   Referral/Consult From: Multi-disciplinary team   Support System Spouse   Continue Visiting   (11/02/20)   Complexity of Encounter High   Length of Encounter 1 hour   Spiritual Assessment Completed Yes   Crisis   Type Code   Assessment Unable to respond   Intervention Sustaining presence/ Ministry of presence   Outcome Did not respond        11/02/20 1324   Encounter Summary   Services provided to: Family   Referral/Consult From: Multi-disciplinary team   Support System Spouse   Continue Visiting   (11/02/20)   Complexity of Encounter High   Length of Encounter 1 hour   Spiritual Assessment Completed Yes   Crisis   Type Code   Assessment Unable to respond   Intervention Sustaining presence/ Ministry of presence   Outcome Did not respond

## 2020-11-02 NOTE — FLOWSHEET NOTE
The patient has Meropenem scheduled every 8 hours, continuous IV heparin, one PICC line access, and he has poor venous access. Heparin is infusing through the PICC line, and based on what the pharmacist said, the Heparin and Meropenem are not compatible. Writer failed twice to get a new IV access as well as the charge nurse. The on call [facility] was informed for possibility to switch the patient to oral antibiotic, he ordered  to continue heparin and talk to the  primary about the meropenam once he is at bedside.

## 2020-11-02 NOTE — PROCEDURES
Patient with recent PEA and CPR, returned from cath lab with intra-aortic balloon pump. Nursing staff requested placement of arterial line due to malfunction from the IABP. The right radial artery was evaluated with ultrasound, it was patent and calcified. The area over his wrist was cleaned and an arterial line was successfully placed under ultrasound guidance, permanent ultrasound images saved. There was a good waveformon the monitor and the arterial line was secured in place.     Electronically signed by Ileana Love MD on 11/2/2020 at 5:28 PM

## 2020-11-02 NOTE — SIGNIFICANT EVENT
Was called to a CODE BLUE in [address] 2019. Patient was found to be in PEA arrest per nursing staff. Patient was being coded had already received a round of epinephrine prior to my arrival.  At bedside with Dr. Amrik Cruz, who defer the code to me. Dr. Sven Cook, cardiologist, was in place less than 30 seconds requested take control of the code. Dr. Bolivar Saavedra then came up after him and deferred running the code to Dr. Sven Cook, per hospital policy. After approximately 5 minutes of CPR and tomorrow therapy, ROSC was obtained. Patient was then given 30 mg of etomidate and 100 mg of succinylcholine. Prior to receiving RSI medication, patient had a pulse ox of 100% on bag-valve-mas viak 100% O2. Two attempts were made intubating with a Glidescope and Mac 4 blade. Using the [facility], there was copious gastric contents in the esophagus there were suctioned out with a Yankauer. There were 2 unsuccessful attempts due significantly anterior neck, but no significant bleeding or damage noted to the teeth or the esophagus. Anesthesia was called overhead for difficult airway. During this time patient was ventilated with bag mask and maintain SPO2 of over 100%. Dr. Cam Barrett, from anesthesiology, entered the room for it difficult to patient. In the interim the morning to the back to bed CPR had to be reinitiated for lost pulses. This time patient had SPO2 in the 100s noted on the monitor, with a good waveform with CPR. Dr. Cam Barrett that he was successful with intubation during the second episode of cardiac arrest PEA. ROSC was obtained, and twelve-lead revealed ST elevation in anterior leads. Dr. Sven Cook stated that there is nothing else for medical critical care to do at this time, did not request for anything else. Addendum: An appointment during any intubation attempt was SPO2 less than 90% with good waveform.   In addition to placement was confirmed with color change, misting, negative epigastric sounds on ventilation, and bilateral and equal breath sounds noted on the chest.  CXR confirmed position of the ETT.

## 2020-11-02 NOTE — PROGRESS NOTES
[facility]  Internal Medicine Teaching Residency Program  Inpatient Daily Progress Note  ______________________________________________________________________________    Patient: Angela West  YOB: 1945   ISJ:[id number]    Acct: [de-identified]     Room: 2019/2019-01  Admit date: 10/24/2020  Today's date: 11/02/20  Number of days in the hospital: 9    SUBJECTIVE   Admitting Diagnosis: Acute on chronic respiratory failure with hypoxia and hypercapnia (HCC)  CC: Shortness of breath  Pt examined at bedside. Chart & results reviewed. No acute overnights. On 5L NC. Denies chest pain, abdominal pain, nausea, vomiting, fever, chills, diarrhea and constipation. Hemodynamically stable   BP: 141/58, HR: 70     Plan for CABG on 11/2. Patient has no sufficient grafts for CABG. Cardiology will re-evaluate if the patient is a candidate for CABG. Urine output proving, 1.2 L in 24 hours. Nephrology following. Tacrolimus level 4.8. Torsemide on hold due to increase creat   1.20>1.53>1.85> 1.78 > 2.18    Creatinine trending down 2.08    Pleural fluid culture shows no growth. Urine culture 10/25 shows Klebsiella colonization. Meropenem on hold due to lack of IV access. Continuous IV heparin through PICC line. ID recommended meropenem prophylactically in case of CABG for the possible Santos placement to the patient.     ROS:  Constitutional:  negative for chills, fevers, sweats  Respiratory:  negative for cough, dyspnea on exertion, hemoptysis, shortness of breath, wheezing  Cardiovascular:  negative for chest pain, chest pressure/discomfort, lower extremity edema, palpitations  Gastrointestinal:  negative for abdominal pain, constipation, diarrhea, nausea, vomiting  Neurological:  negative for dizziness, headache  BRIEF HISTORY     64-year-old -American male with past medical history of renal failure s/p renal transplant (2012), congestive heart failure with preserved ejection fraction with greater than 70%, COPD on 2 L home oxygen, pulmonary hypertension with calculated RVSP 64 mmHg, diabetes mellitus type 2, hypertension, chronic immune suppression on Myfortic and tacrolimus, bilateral below-knee amputation, A. fib on Eliquis, presented to the emergency department with shortness of breath.  He was transferred from other hospital due to nonavailability of beds.      Was recently discharge from [facility] after respiratory failure due to CHF and pneumonia where he was intubated.  Reports of increasing shortness of breath, chest pain, swelling of the body.  Denies fever, chills, abdominal pain, common cold. Patient wife states that her  reports back pain due to sacral decubitis    OBJECTIVE     Vital Signs:  BP (!) 141/58   Pulse 70   Temp 98.2 °F (36.8 °C) (Oral)   Resp 16   Ht 6' 1\" (1.854 m)   Wt 229 lb 4.8 oz (104 kg)   SpO2 92%   BMI 30.25 kg/m²     Temp (24hrs), Av °F (36.7 °C), Min:97.5 °F (36.4 °C), Max:98.3 °F (36.8 °C)    In: 907   Out: 1220 [Urine:1220]    Physical Exam:  Constitutional: This is a well developed, well nourished, 25-29.9 - Overweight 66y.o. year old male who is alert, oriented, cooperative and in no apparent distress. Head:normocephalic and atraumatic. EENT:  PERRLA. No conjunctival injections. Septum was midline, mucosa was without erythema, exudates or cobblestoning. No thrush was noted. Neck: Supple without thyromegaly. No elevated JVP. Trachea was midline. Respiratory: Chest was symmetrical without dullness to percussion. Breath sounds bilaterally were clear to auscultation. There were no wheezes, rhonchi or rales. There is no intercostal retraction or use of accessory muscles. No egophony noted. Cardiovascular: Regular without murmur, clicks, gallops or rubs. Abdomen: Slightly rounded and soft without organomegaly. No rebound, rigidity or guarding was appreciated.     Lymphatic: No lymphadenopathy. Musculoskeletal: Normal curvature of the spine. No gross muscle weakness. Extremities: B/l BKA    Skin:  Warm and dry. Good color, turgor and pigmentation. No lesions or scars.   No cyanosis or clubbing  Neurological/Psychiatric: The patient's general behavior, level of consciousness, thought content and emotional status is normal.        Medications:  Scheduled Medications:    insulin lispro  2 Units Subcutaneous TID     meropenem  1 g Intravenous Q8H    insulin glargine  15 Units Subcutaneous Nightly    carvedilol  6.25 mg Oral BID WC    [Held by provider] torsemide  40 mg Oral Daily    sodium chloride flush  10 mL Intravenous 2 times per day    aspirin  81 mg Oral Daily    amLODIPine  10 mg Oral Nightly    tacrolimus  3 mg Oral BID    predniSONE  5 mg Oral Daily    mycophenolate  540 mg Oral BID    insulin lispro  0-6 Units Subcutaneous TID WC    insulin lispro  0-3 Units Subcutaneous Nightly    atorvastatin  40 mg Oral Nightly    tamsulosin  0.4 mg Oral Daily    sennosides-docusate sodium  1 tablet Oral Daily    sodium chloride flush  10 mL Intravenous 2 times per day     Continuous Infusions:    heparin (PORCINE) Infusion 12.106 Units/kg/hr (11/01/20 0847)    dextrose       PRN Medicationssodium chloride flush, 10 mL, PRN  acetaminophen, 650 mg, Q4H PRN  labetalol, 20 mg, Q2H PRN  sodium chloride flush, 10 mL, PRN  acetaminophen, 650 mg, Q6H PRN    Or  acetaminophen, 650 mg, Q6H PRN  polyethylene glycol, 17 g, Daily PRN  promethazine, 12.5 mg, Q6H PRN    Or  ondansetron, 4 mg, Q6H PRN  heparin (porcine), 4,000 Units, PRN  heparin (porcine), 2,000 Units, PRN  glucose, 15 g, PRN  dextrose, 12.5 g, PRN  glucagon (rDNA), 1 mg, PRN  dextrose, 100 mL/hr, PRN        Diagnostic Labs:  CBC:   Recent Labs     10/31/20  0706 11/01/20  0751 11/02/20  0630   WBC 4.3 4.3 4.3   RBC 2.89* 2.82* 2.81*   HGB 8.4* 8.2* 8.3*   HCT 30.4* 27.9* 26.9*   .2* 98.9 95.7   RDW 17.8* 17.6* 17.5*   PLT See Reflexed IPF Result See Reflexed IPF Result See Reflexed IPF Result     BMP:   Recent Labs     10/31/20  0706 11/01/20  0751 11/02/20  0630    138 136   K 4.8 3.9 4.2    103 99   CO2 25 27 27   BUN 46* 47* 49*   CREATININE 1.78* 2.12* 2.08*     BNP: No results for input(s): BNP in the last 72 hours. PT/INR: No results for input(s): PROTIME, INR in the last 72 hours. APTT:   Recent Labs     10/31/20  1436 11/01/20  0751 11/02/20  0630   APTT 63.2* 70.5* 39.5*     CARDIAC ENZYMES: No results for input(s): CKMB, CKMBINDEX, TROPONINI in the last 72 hours. Invalid input(s): CKTOTAL;3  FASTING LIPID PANEL:  Lab Results   Component Value Date    CHOL 166 05/11/2017    HDL 43 05/11/2017    TRIG 132 05/11/2017     LIVER PROFILE: No results for input(s): AST, ALT, ALB, BILIDIR, BILITOT, ALKPHOS in the last 72 hours. MICROBIOLOGY:   Lab Results   Component Value Date/Time    CULTURE PENDING 10/30/2020 10:54 AM       Imaging:    Ct Chest Wo Contrast    Result Date: 10/29/2020  Moderate right greater than left layering simple pleural effusions with associated atelectasis. As this occurs in the setting of mild pulmonary edema and dependent subcutaneous edema, this presumably relates to positive fluid balance. Findings suggestive of underlying pulmonary cystic disease with numerous randomly distributed thin walled cysts throughout the aerated portions of the lungs bilaterally. Correlate with prior clinical history of such. Cardiomegaly with coronary artery, aortic valvular, and papillary muscle calcifications and a small pericardial effusion. Cholelithiasis. 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 Xr Chest Portable    Result Date: 10/30/2020  No significant pneumothorax status post right thoracentesis.  Mild right clavicle enlargement with cortical thickening and a coarsened trabecular pattern likely due to Paget's disease; although probably less likely, metastatic disease would be in the differential diagnosis. Bone scan is recommended for follow-up. The findings were sent to the Radiology Results [address] at 12:37 pm on 10/30/2020to be communicated to a licensed caregiver. Xr Chest Portable    Result Date: 10/24/2020  Cardiomegaly with hydrostatic edema and small layering pleural effusions in keeping with acute congestive heart failure. Us Thoracentesis    Result Date: 10/30/2020  Successful ultrasound guided right thoracentesis. ASSESSMENT & PLAN     ASSESSMENT / PLAN:     Principal Problem:    Acute on chronic respiratory failure with hypoxia and hypercapnia (HCC)  Principal Problem:    Acute on chronic respiratory failure with hypoxia and hypercapnia (HCC)  Active Problems:    Shortness of breath    Acute respiratory acidosis    Acute on chronic diastolic (congestive) heart failure ([facility])    Hypernatremia    CYNTHIA (acute kidney injury) ([facility]    Normocytic anemia    Atrial flutter with controlled response ([facility])    Anemia of chronic renal failure    History of renal transplant    Hyperlipidemia    Nonproliferative diabetic retinopathy ([facility])    Obstructive sleep apnea syndrome    Stage 3 chronic kidney disease    Uncontrolled type 2 diabetes mellitus (HCC)    S/P bilateral BKA (below knee amputation) ([facility] [facility]    Acute cystitis with hematuria    Sacral decubitus ulcer    Hypokalemia    Persistent proteinuria    NSTEMI (non-ST elevated myocardial infarction) ([facility]    Multiple vessel coronary artery disease    COPD exacerbation (HCC)  Resolved Problems:    * No resolved hospital problems. *      Plan:   1. Acute on chronic diastolic heart failure: Improving: On cardiac diet fluid restriction 1500, strict input output record, daily weight, heparin drip.  nephro and cardiology following. Hold torsemide. 2. Acute hypoxic and hypercapnia respiratory failure: Due to #1. improving. Hold diuresis, wean off oxygen.   3. Pleural effusion s/p right side thoracentesis of 1.1l of leticia fluid. Culture show no growth. 4. Acute metabolic encephalopathy:improving: multifactorial due to uti and resp failure  5. S/p Cardiac Cath- show multivessel disease. cardiothoracic planning for CABG. On continuous heparin drip. Cardiology to re-evaluate the patient if he is a candidate for CABG due to insufficient veins to harvest.   6. Essential hypertension:controlled. continue amlodipine 10 mg and Coreg twice daily. 7. Elevated troponin: possibly due to underlying demand ischemia, or CYNTHIA or infection. Cardiology following, on heparin drip  8. Urinary tract infection: Colonization due to klebsiella pneumonia. meropenem not given due to lack of IV access. ID on board  9. Diabetes mellitus Type II: MyM2K-2.6 (2/14/20) on high-dose insulin corrective algorithm and lantus 20 units nightly. On hypoglycemic protocol. 10. Acute kidney injury on top of CKD stage III - Creatinine trending down. Continue to monitor BMP. Nephrology following.  Urine output improving. 11. Hyperlipidemia-stable, started on Lipitor 40 mg daily  12. A. Fib: On rate control with coreg.  on heparin drip. Will be switched to Eliquis on discharge the patient uses at home. 13. COPD: Stable will resume home meds on discharge  14. S/p renal transplant: On immunosuppressive agent.  Tacrolimus and mycophenolate sodium. Nephrology on board. hold diuretics for today. Tacrolimus levels 4.8. 15. Obstructive sleep apnea- Bipap. 16. Anemia of chronic disease: Due to CRF-will monitor hemoglobin  17. DVT Prophylaxis : on heparin drip. Sarah Montalvo MD  Internal Medicine Resident, PGY-1  [facility]; [place], [place]  11/2/2020, 10:24 AM   Attending Physician Statement  I have discussed the care of [facility], including pertinent history and exam findings,  with the resident.  I have seen and examined the patient and the key elements of all parts of the encounter have been performed by me.  I agree with the assessment, plan and orders as documented by the resident with additions . Treatment plan Discussed with nursing staff in detail , all questions answered . Electronically signed by Wilain Pollard MD on   11/2/20 at 10:13 PM EST  CC 30 minutes  Please note that this chart was generated using voice recognition Dragon dictation software. Although every effort was made to ensure the accuracy of this automated transcription, some errors in transcription may have occurred.

## 2020-11-02 NOTE — PROGRESS NOTES
[facility]   Progress Note                   Date:   11/2/2020  Patient name: Rojas Hodgson  Date of admission:  10/24/2020  4:14 PM  MRN:   [id number]  YOB: 1945  PCP: Jess Epley, MD    Reason for Admission: Shortness of breath [R06.02]  Shortness of breath [R06.02]    Subjective:       Clinical Changes / Abnormalities: Patient seen and examined in the room after discussion with RN. Denies any CP or SOB. Remains Aflutter rate controlled. Patient s/p cardiac cath with MVD. Referral to CTS. Awaiting recs.       -4.2L since admission  Medications:   Scheduled Meds:   insulin lispro  2 Units Subcutaneous TID WC    LORazepam  0.5 mg Intravenous Once    meropenem  1 g Intravenous Q8H    metoprolol  2.5 mg Intravenous Once    insulin glargine  15 Units Subcutaneous Nightly    carvedilol  6.25 mg Oral BID WC    [Held by provider] torsemide  40 mg Oral Daily    sodium chloride flush  10 mL Intravenous 2 times per day    aspirin  81 mg Oral Daily    amLODIPine  10 mg Oral Nightly    tacrolimus  3 mg Oral BID    predniSONE  5 mg Oral Daily    mycophenolate  540 mg Oral BID    insulin lispro  0-6 Units Subcutaneous TID WC    insulin lispro  0-3 Units Subcutaneous Nightly    atorvastatin  40 mg Oral Nightly    tamsulosin  0.4 mg Oral Daily    sennosides-docusate sodium  1 tablet Oral Daily    sodium chloride flush  10 mL Intravenous 2 times per day     Continuous Infusions:   heparin (PORCINE) Infusion 12.106 Units/kg/hr (11/01/20 0847)    dextrose       CBC:   Recent Labs     10/31/20  0706 11/01/20  0751 11/02/20  0630   WBC 4.3 4.3 4.3   HGB 8.4* 8.2* 8.3*   PLT See Reflexed IPF Result See Reflexed IPF Result See Reflexed IPF Result     BMP:    Recent Labs     10/31/20  0706 11/01/20  0751 11/02/20  0630    138 136   K 4.8 3.9 4.2    103 99   CO2 25 27 27   BUN 46* 47* 49*   CREATININE 1.78* 2.12* 2.08*   GLUCOSE 107* 130* 199*     Hepatic: No results for input(s): AST, ALT, ALB, BILITOT, ALKPHOS in the last 72 hours. Troponin: No results for input(s): TROPHS in the last 72 hours. BNP: No results for input(s): BNP in the last 72 hours. Lipids: No results for input(s): CHOL, HDL in the last 72 hours. Invalid input(s): LDLCALCU  INR: No results for input(s): INR in the last 72 hours. Echo 10/10/20  Result Narrative     Left Ventricle: Systolic function is hyperdynamic with an ejection   fraction over 70%.   Aortic Valve: There is no regurgitation. There is mild stenosis. The   calculated aortic valve area is 1.80 cm2. The calculated aortic valve peak   gradient is 17.00 mmHg. The calculated aortic valve mean gradient is 12.0   mmHg.   Tricuspid Valve: RVSP calculated at 64 mmHg. RVSP is based on RA   pressure of 8 mmHg.   Left Ventricle: There is severe increased wall thickness/hypertrophy.      Echo 10/26/20    Summary  Left ventricle is normal in size. Global left ventricular systolic function  is normal.  Calculated ejection fraction 58% by Carrion's method. Normal mitral valve structure and function. Mild mitral regurgitation. Normal tricuspid valve structure. Trivial tricuspid regurgitation.     Cath 10/27/20:  LMCA: Diffuse irregularities 20-30%. LAD: Diffuse irregularities 30-40% and Single stenosis.    Lesion on Mid LAD: 80% stenosis 50 mm length. Lesion plaque is ruptured.    Comments:heavily calcified. LCx: Multiple stenosis.    Lesion on Prox CX: 90% stenosis.    Lesion on 1st Ob Yue: Mid subsection. 75% stenosis.    Lesion on 2nd Ob Yue: Mid subsection. 80% stenosis. RCA: Single stenosis. with patent distal stent    Lesion on Mid RCA: 75% stenosis.       Objective:   Vitals: BP (!) 141/58   Pulse 70   Temp 98.2 °F (36.8 °C) (Oral)   Resp 16   Ht 6' 1\" (1.854 m)   Wt 229 lb 4.8 oz (104 kg)   SpO2 92%   BMI 30.25 kg/m²   General appearance: alert and cooperative with exam  HEENT: Head: Normocephalic, no lesions, without obvious abnormality. Neck: no JVD, trachea midline, no adenopathy  Lungs: Rales bilaterally  Heart: irregular rate and rhythm, s1/s2 auscultated, no murmurs. A flutter on tele  HR 70s  Abdomen: soft, non-tender, bowel sounds active  Extremities: no edema, bilateral BKA   Neurologic: not done        Assessment / Acute Cardiac Problems:   1. MV CAD on cath 10/27/20  2. Acute on chronic diastolic heart failure  3. Preserved EF  4. NSTEMI  5. Atrial Fibrillation/Atrial Flutter on eliquis at home  6. T2Dm  7. HTN  8. PVD s/p MICHEAL BKA  9. Renal Failure s/p transplant       Patient Active Problem List:     Shortness of breath     Acute respiratory acidosis     Acute on chronic respiratory failure with hypoxia and hypercapnia (HCC)     Acute on chronic diastolic (congestive) heart failure (HCC)     Hypernatremia     CYNTHIA (acute kidney injury) ([facility]     Normocytic anemia     Atrial flutter with controlled response (HCC)     Anemia of chronic renal failure     History of renal transplant     Hyperlipidemia     Nonproliferative diabetic retinopathy (HCC)     Obstructive sleep apnea syndrome     Stage 3 chronic kidney disease     Uncontrolled type 2 diabetes mellitus (HCC)     S/P bilateral BKA (below knee amputation) ([facility] [address]     Acute cystitis with hematuria     Sacral decubitus ulcer     Hypokalemia     Persistent proteinuria     NSTEMI (non-ST elevated myocardial infarction) ([address]     Multiple vessel coronary artery disease      Plan of Treatment:   1. CAD- MVD per Cardiac cath. Await CTS preop testing and final recs. Per notes will be discussed in AM with surgeon. Continue CCB, ASA, statin, BB. Heparin gtt     2. CHF- Improving. Fluid management per nephrology. 3.   HTN- Stable. Continue current medications   4. Right pleural effusion s/p thoracentesis per CT. 5.   Afluttter. Rate controlled. Continue Heparin and BB. On Eliquis at home, will resume on discharge.       Electronically signed by ALFREDO Melendez - NP on 11/2/2020 at 9:49 AM  [address]  [phone]

## 2020-11-02 NOTE — PROGRESS NOTES
Pt unresponsive, PEA; CPR was started.  Code blue called at 12:23 pm and ROSC was achieved and patient was sent to cath lab and then pt will be transferred to ICU

## 2020-11-03 VITALS
DIASTOLIC BLOOD PRESSURE: 69 MMHG | SYSTOLIC BLOOD PRESSURE: 147 MMHG | RESPIRATION RATE: 18 BRPM | WEIGHT: 229.3 LBS | OXYGEN SATURATION: 100 % | HEIGHT: 73 IN | TEMPERATURE: 96.6 F | BODY MASS INDEX: 30.39 KG/M2

## 2020-11-03 LAB
CULTURE: NO GROWTH
EKG ATRIAL RATE: 101 BPM
EKG ATRIAL RATE: 108 BPM
EKG ATRIAL RATE: 57 BPM
EKG P AXIS: 83 DEGREES
EKG P-R INTERVAL: 246 MS
EKG P-R INTERVAL: 288 MS
EKG P-R INTERVAL: 392 MS
EKG Q-T INTERVAL: 320 MS
EKG Q-T INTERVAL: 348 MS
EKG Q-T INTERVAL: 402 MS
EKG QRS DURATION: 104 MS
EKG QRS DURATION: 118 MS
EKG QRS DURATION: 92 MS
EKG QTC CALCULATION (BAZETT): 391 MS
EKG QTC CALCULATION (BAZETT): 428 MS
EKG QTC CALCULATION (BAZETT): 464 MS
EKG R AXIS: -22 DEGREES
EKG R AXIS: -36 DEGREES
EKG R AXIS: -51 DEGREES
EKG T AXIS: 113 DEGREES
EKG T AXIS: 113 DEGREES
EKG T AXIS: 60 DEGREES
EKG VENTRICULAR RATE: 107 BPM
EKG VENTRICULAR RATE: 108 BPM
EKG VENTRICULAR RATE: 57 BPM
Lab: NORMAL
SPECIMEN DESCRIPTION: NORMAL

## 2020-11-03 NOTE — PROGRESS NOTES
family (wife, daughter and granddaughter) at bedside. Per their wishes and request all life sustaining supportive meausures, vasopressors, ventilator support and balloon pump turned off. Dr Rachelle Matt updated.  family remains at bedside, bud at bedside providing support. Patient asystole on telemetry monitor. No pulse, no heart tones, no breath sounds verified with RN x2, UZMA Vásquez RN and AIDEN Stanley RN. Dr Rachelle Matt and Dr Hannah Daigle notified. All attending services notified of patient death, life connections and nurse manager notified.   released patient to [facility]  home per family request.

## 2020-11-03 NOTE — FLOWSHEET NOTE
[facility]   Patient Death Note  DEATH   Shift date: 20    Shift day: Monday  Shift #: 3                 Room # 1011/1011-01   Name: Bryan Irene            Age: 66 y.o. Gender: male          Christianity: [employer]      [facility]   Admit Date & Time: 10/24/2020  4:14 PM     Referral: Nurse    Actual date of death: 20   TOD: 21:57       SITUATION AT DEATH:  The patient coded earlier in the day. The patient's support was withdrawn and the patient  shortly after. IS THIS A 'S CASE? Known at the time of writing       SPIRITUAL/EMOTIONAL INTERVENTION:  The patient  surrounded by his loved ones. The patient's wife, daughter, and granddaughter were at bedside. The  read scripture and prayed with the patient and family. Family Received Grief Packet? Yes       NAME AND PHONE NUMBER OF DOCTOR SIGNING DEATH CERTIFICATE:  (full name)   Yony Ayoub    (phone)  [phone]       spoke with [id number] , who will contact the  home  (Please note which nurse you spoke with to confirm the  home will be contacted. Family should not be listed)    Copy of COMPLETED Release of Body Form Received? Yes     HOME:  Name: Kaylene Hernandez 52: [facility]   Phone Number: [phone]    NEXT OF KIN:  Name: Da Barnes   Relationship: Wife   Street Address: [address] [place] [address]: [place]  Zip code: [address]   Phone Number: [phone]    [facility]? No    IF SO, WHAT?  NONE     Electronically signed by Rosmery Gilmore, on 2020 at 10:08 PM.  [facility]  [phone]       20 2207   Encounter Summary   Services provided to: Family   Referral/Consult From: Nurse   Support System Spouse; Children   Place of [facility]    Continue Visiting   (20)   Complexity of Encounter High   Length of Encounter 1 hour   Spiritual Assessment Completed Yes   Grief and Life Adjustment   Type Death   Assessment Grieving; Approachable   Intervention Active listening;Sustaining presence/ Ministry of presence   Outcome Grieving; Hopeful

## 2020-11-03 NOTE — SIGNIFICANT EVENT
Significant update:    Patient in CV ICU and had Code blue with cardiac arrest at 19:18, PEA. ROS achieved at 19:34 and patient on IABP and already intubated. I had a long discussion with the patient's family in person, in presence of the RN taking care of the patient. Patient's current clinical condition, laboratory and radiographic findings as well as recommendations of physicians consulted on the case were discussed with the patient's family in detail in simple [place]. All questions and concerns of the family were addressed, and appropriate emotional support was provided. After understanding patient's current medical condition, family decided that did not want any lab draws or treatments aimed at prolonging the life of patient, at the cost of patient's overall comfort. They expressed their wishes to make the patient comfortable, stop all lab draws and not to do any resuscitative procedures on the patient. They requested patient's code status to be changed to [facility][1], and signed the [facility][1] order form in my presence, which was witnessed by RN, Dontrell Carrion. Family wife, daughter and granddaughter were present. Wife(Sukhwinder) is Next of kin and decision maker. Also updated Med-1 resident and Attending Dr. Mary Hinson. Will honor family's wishes, and will change patient's code status to [facility][1].         Kalpana Navarro MD, PGY-2  Internal Medicine Residency Program  [address]  11/2/2020 8:59 PM

## 2020-11-03 NOTE — SIGNIFICANT EVENT
DEATH NOTE    PATIENT NAME: Don De La Cruz  YOB: 1945  MEDICAL RECORD NO. [id number]  DATE: 11/3/2020  PRIMARY CARE PHYSICIAN: Jonas Koenig MD    DIAGNOSIS OF DEATH     Confirmed the death of this patient in accordance with accepted medical standards. Verified by RN x2, namely UZMA Mary RN and AIDEN Stanley RN. The patient is dead as evidenced by cardiac death or cessation of brain function:    Cardiac Death (check all that apply):     [x]  Absence of respiratory effort by observation      [x]  Absence of pulse by palpation     []  Absence of blood pressure by sphygmomanometry     [x]  Absence of sustainable cardiac rhythm by monitor    Death by Cessation of Brain Function (check all that apply):     []  Absence of Cerebral Function with no motor response     []  Absence of brain stem function by systemic physical exam     []  Failure to respond with respiratory drive by apnea test     []  Cerebral Electrical silence as interpreted by qualified reader        OR     []  Absence of brain blood flow by radiologic technique      CERTIFICATION OF DEATH     The patient pronounced dead on:     Date: 11/02/2020 at  21:57    NOTIFICATIONS     Attending physician that will sign Death Certificate: Dr. Avie Goodell, MD                                                           Family notified: Name and/or Relationship: Wife Phong Herring, daughter and grand daughter.                                                           [x]  Per Nursing     notified (Name):                                                          [x]  Per Nursing        Cheryl Graf MD, PGY-2  Internal Medicine Residency Program  [facility]

## 2020-11-03 NOTE — PROGRESS NOTES
11/02/20 1921 11/02/20 2006   Vent Information   PEEP/CPAP 8 10  (post code)   I Time/ I Time % 0.9 s 1.4 s  (post code)   Pt coded, manually bagged with 100% fio2. Placed on the following settings post code. ABG Drawn and Dr. Malcolm Veras updated by voicemail. ANETTE Boyer aware.

## 2020-11-03 NOTE — PROGRESS NOTES
Episode of PEA arrest. ROSC after 16 min. Maxed on multiple pressors. Prolonged discussed with family Wife(Sukhwinder) and daughter regarding critical condition including cardiac work up in detail. Options discussed including ongoing care with current resuscitative efforts. Family expressed wishes to make patient comfortable and requested [facility]. Communicated to primary who had further discussion with family.     Discussed with Attending    Ruthann Gilbert MD  Fellow, Cardiovascular Diseases    [facility]

## 2020-11-03 NOTE — PROCEDURES
PS Dr. Patti Waldrop in regards to hooking up LTME tonight, he said ok to wait until tomorrow. Thank you!

## 2020-11-03 NOTE — FLOWSHEET NOTE
Dr Estelita Collet and Dr. Munir Lemus at bedside, spoke with patient's wife and daughter regarding patient status, code interventions and prognosis. Family requests [facility] and requests all support to be withdrawn.

## 2020-11-03 NOTE — FLOWSHEET NOTE
2020 2247: writer called Glenda Daigle, [address] 2 charge nurse, per family request for patient's missing eyeglasses. Glenda Daigle states she will attempt to locate them. 11/3/2020 0014: post mortem care complete, [facility]  home notified. Security services contacted, patient transported to [facility] by cart, no personal property with patient. 11/3/2020 0053: writer contacted Glenda Daigle regarding patient's eyeglasses, she states she was not able to locate them. Writer updated Car 1 charge nurse, [facility].

## 2020-11-03 NOTE — PROGRESS NOTES
Ventilator shut off, disconnected from patient. RN Rhett Saleh and family at bedside.  No breathing effort noticed

## 2020-11-04 ENCOUNTER — TELEPHONE (OUTPATIENT)
Dept: PULMONOLOGY | Age: 75
End: 2020-11-04

## 2020-11-05 LAB
CULTURE: NORMAL
DIRECT EXAM: NORMAL
Lab: NORMAL
SPECIMEN DESCRIPTION: NORMAL

## 2020-11-08 NOTE — DISCHARGE SUMMARY
[address]     Department of Internal Medicine - Staff Internal Medicine Teaching Service    INPATIENT DISCHARGE SUMMARY      Patient Identification:  Robert Hernández is a 66 y.o. male. :  1945  MRN: [id number]     Acct: [de-identified]   PCP: Bhupendra Overton MD  Admit Date:  10/24/2020  Discharge date and time: 2020  9:57 PM   Attending Provider: Dr. Deloris Oleary Problem Lists:  Principal Problem:    Acute on chronic respiratory failure with hypoxia and hypercapnia (HCC)  Active Problems:    Shortness of breath    Acute respiratory acidosis    Acute on chronic diastolic (congestive) heart failure (HCC)    Hypernatremia    CYNTHIA (acute kidney injury) ([facility] [address]    Normocytic anemia    Atrial flutter with controlled response (HCC)    Anemia of chronic renal failure    History of renal transplant    Hyperlipidemia    Nonproliferative diabetic retinopathy (HCC)    Obstructive sleep apnea syndrome    Stage 3 chronic kidney disease    Uncontrolled type 2 diabetes mellitus (HCC)    S/P bilateral BKA (below knee amputation) ([facility] [address]    Acute cystitis with hematuria    Sacral decubitus ulcer    Hypokalemia    Persistent proteinuria    NSTEMI (non-ST elevated myocardial infarction) ([facility] [address]    Multiple vessel coronary artery disease    COPD exacerbation (HCC)    PEA (Pulseless electrical activity) ([facility] [address]  Resolved Problems:    * No resolved hospital problems. *      HOSPITAL STAY     Brief Inpatient course:    Robert Hernández is a 66 y.o. male who was admitted for the management of Acute on chronic respiratory failure with hypoxia and hypercapnia (HCC)   Brief history:  75-year-old -American male with past medical history of renal failure s/p renal transplant (), congestive heart failure with preserved ejection fraction with greater than 70%, COPD on 2 L home oxygen, pulmonary hypertension with calculated RVSP 64 mmHg, diabetes mellitus type 2, hypertension, chronic immune suppression on Myfortic and tacrolimus, bilateral below-knee amputation, A. fib on Eliquis, presented to the emergency department with shortness of breath.  He was transferred from other hospital due to nonavailability of beds.      Was recently discharge from [facility] after respiratory failure due to CHF and pneumonia where he was intubated.  Reports of increasing shortness of breath, chest pain, swelling of the body.  Denies fever, chills, abdominal pain, common cold. Patient wife states that her  reports back pain due to sacral decubitis.    significant therapeutic interventions done at the hospital:     1. Acute on chronic diastolic heart failure: Improving: On cardiac diet fluid restriction 1500, strict input output record, daily weight, heparin drip.  nephro and cardiology following. Hold torsemide. 2. Acute hypoxic and hypercapnia respiratory failure: Due to #1. improving. Hold diuresis, wean off oxygen. 3. Pleural effusion s/p right side thoracentesis of 1.1l of leticia fluid. Culture show no growth. 4. Acute metabolic encephalopathy:improving: multifactorial due to uti and resp failure  5. S/p Cardiac Cath- show multivessel disease. cardiothoracic planning for CABG. On continuous heparin drip. Cardiology to re-evaluate the patient if he is a candidate for CABG due to insufficient veins to harvest.   6. Essential hypertension:controlled. continue amlodipine 10 mg and Coreg twice daily. 7. Elevated troponin: possibly due to underlying demand ischemia, or CYNTHIA or infection. Cardiology following, on heparin drip  8. Urinary tract infection: Colonization due to klebsiella pneumonia. meropenem not given due to lack of IV access. ID on board  9. Diabetes mellitus Type II: QtO4G-3.6 (2/14/20) on high-dose insulin corrective algorithm and lantus 20 units nightly. On hypoglycemic protocol.   10. Acute kidney injury on top of CKD stage III - Creatinine trending down. Continue to monitor BMP. Nephrology following.  Urine output improving. 11. Hyperlipidemia-stable, started on Lipitor 40 mg daily  12. A. Fib: On rate control with coreg.  on heparin drip. Will be switched to Eliquis on discharge the patient uses at home. 13. COPD: Stable will resume home meds on discharge  14. S/p renal transplant: On immunosuppressive agent.  Tacrolimus and mycophenolate sodium. Nephrology on board. hold diuretics for today. Tacrolimus levels 4.8. 15. Obstructive sleep apnea- Bipap. 16. Anemia of chronic disease: Due to CRF-will monitor hemoglobin    Significant event :11/2:  Code BLUE was called in the afternoon as the patient was noted to be in PEA/asystole cardiac arrest. He was resuscitated and ROSC was achieved after 14 mins of CPR. He was also intubated by Anesthesia at bedside. A stat 12 lead EKG was obtained which revealed ST elevation in anterior leads. Prior cath results were reviewed, which revealed plaques rupture in mid LAD. The patient was taken for an emergent cardiac cath at that time which revealed the following (same as before). Patient in CV ICU and had Code blue with cardiac arrest at 19:18, PEA. ROS achieved at 19:34 and patient on IABP and already intubated. Patient's code status changed to DNR CC. Family wife, daughter and granddaughter were present. Wife(Sukhwinder) is Next of kin and decision maker. Patient pronounced dead on 11/2/20 at 21:57.      Procedures/ Significant Interventions:    Cardiac cath    Consults:     Consults:     Final Specialist Recommendations/Findings:   IP CONSULT TO INTERNAL MEDICINE  IP CONSULT TO CARDIOLOGY  IP CONSULT TO CASE MANAGEMENT  IP CONSULT TO NEPHROLOGY  IP CONSULT TO CARDIOTHORACIC SURGERY  PHARMACY CONSULT FOR RENAL DOSING  IP CONSULT TO INFECTIOUS DISEASES  IP CONSULT TO INFECTIOUS DISEASES  IP CONSULT TO IV TEAM  IP CONSULT TO NEUROLOGY      Any Hospital Acquired Infections: none    Discharge Functional Status:  N/A    DISCHARGE PLAN     Disposition: Patient .      Patient Instructions:   Discharge Medication List as of 11/3/2020  1:48 AM      CONTINUE these medications which have NOT CHANGED    Details   Pollen Extracts (PROSTAT PO) Take by mouthHistorical Med      acetaminophen (TYLENOL) 325 MG tablet Take 650 mg by mouth every 6 hours as needed for PainHistorical Med      apixaban (ELIQUIS) 5 MG TABS tablet Take by mouth 2 times dailyHistorical Med      atorvastatin (LIPITOR) 40 MG tablet Take 40 mg by mouth nightlyHistorical Med      vitamin D (CHOLECALCIFEROL) 125 MCG (5000 UT) CAPS capsule Take 5,000 Units by mouth dailyHistorical Med      docusate sodium (COLACE) 100 MG capsule Take 100 mg by mouth 3 times dailyHistorical Med      magnesium oxide (MAG-OX) 400 MG tablet Take 400 mg by mouth dailyHistorical Med      mycophenolate (MYFORTIC) 180 MG DR tablet Take 540 mg by mouth 2 times dailyHistorical Med      ondansetron (ZOFRAN) 4 MG tablet Take 4 mg by mouth every 8 hours as needed for Nausea or VomitingHistorical Med      POTASSIUM CHLORIDE ER PO Take 20 mEq by mouth dailyHistorical Med      b complex vitamins capsule Take 1 capsule by mouth dailyHistorical Med      tacrolimus (PROGRAF) 1 MG capsule Take 3 mg by mouth 2 times dailyHistorical Med      aspirin (ASPIRIN 81) 81 MG EC tablet Take by mouth daily Historical Med      furosemide (LASIX) 40 MG tablet Take 40 mg by mouth 2 times dailyHistorical Med      dorzolamide (TRUSOPT) 2 % ophthalmic solution Place 1 drop into the right eye 2 times daily Historical Med      albuterol (PROVENTIL) (2.5 MG/3ML) 0.083% nebulizer solution Take 2.5 mg by nebulization every 4 hours as needed for Wheezing Historical Med      polyethylene glycol (GLYCOLAX) 17 g packet Take 17 g by mouth daily Historical Med      metoprolol tartrate (LOPRESSOR) 25 MG tablet Take 25 mg by mouth 2 times dailyHistorical Med      sennosides-docusate sodium (SENOKOT-S) 8.6-50 MG tablet Take 2 tablets by mouth daily Historical Med      predniSONE (DELTASONE) 5 MG tablet Take 5 mg by mouth dailyHistorical Med      tamsulosin (FLOMAX) 0.4 MG capsule Take 0.4 mg by mouth nightly Historical Med      amLODIPine (NORVASC) 5 MG tablet Take 10 mg by mouth daily Historical Med      insulin glargine (BASAGLAR KWIKPEN) 100 UNIT/ML injection pen Inject 30 Units into the skin daily Historical Med      bisacodyl (DULCOLAX) 10 MG suppository Place 10 mg rectally dailyHistorical Med      bumetanide (BUMEX) 1 MG tablet Take 2 mg by mouth 2 times dailyHistorical Med      colchicine (COLCRYS) 0.6 MG tablet Take 0.6 mg by mouth dailyHistorical Med      gabapentin (NEURONTIN) 300 MG capsule Take 300 mg by mouth 3 times daily. Arnold Olivares Historical Med      insulin lispro (HUMALOG) 100 UNIT/ML injection vial Inject into the skin 3 times daily (before meals)Historical Med         STOP taking these medications       ertapenem (INVANZ) 1 GM injection Comments:   Reason for Stopping:         dorzolamide-timolol (COSOPT) 22.3-6.8 MG/ML ophthalmic solution Comments:   Reason for Stopping:         aspirin 81 MG tablet Comments:   Reason for Stopping:               Note that over 30 minutes was spent in preparing discharge papers, discussing discharge with patient, medication review, etc.      Barbara Chase MD  Internal Medicine Resident, PGY-1  [facility];  [place], [place]  11/8/2020, 5:08 PM

## 2020-11-09 NOTE — PROCEDURES
[address] [facility]                               PULMONARY FUNCTION    PATIENT NAME: Giles Pillai                  :        1945  MED REC NO:   [id number]                             [facility]  ACCOUNT NO:   [de-identified]                           ADMIT DATE: 10/24/2020  PROVIDER:     Rohit Cevallos    DATE OF PROCEDURE:  10/29/2020    The patient had a suboptimal effort because he was very sleepy. Flow-volume loop is restrictive. FEV1 is 19% predicted, FVC 20%  predicted, FEV1/FVC ratio 74. FINAL IMPRESSION:  Suboptimal effort, which shows very severe  restrictive ventilatory dysfunction. Clinical correlation advised.         Abi Prakash    D: 2020 19:33:16       T: 2020 21:34:42     /[email or web address]  Job#: [id number]     Doc#: [id number]

## 2020-11-30 LAB
CULTURE: NORMAL
Lab: NORMAL
SPECIMEN DESCRIPTION: NORMAL

## 2020-12-14 LAB
CULTURE: NORMAL
DIRECT EXAM: NORMAL
Lab: NORMAL
SPECIMEN DESCRIPTION: NORMAL

## 2022-07-19 NOTE — PROGRESS NOTES
Addendum  created 07/19/22 1507 by Lucinda Hernandez MD    Clinical Note Signed       Occupational Therapy   Occupational Therapy Initial Assessment  Date: 10/27/2020   Patient Name: Robert Hernández  MRN: [id number]     : 1945    Date of Service: 10/27/2020    Discharge Recommendations:  Patient would benefit from continued therapy after discharge  OT Equipment Recommendations  Equipment Needed: (TBD)  Copied from Emergency Medicine: Robert Hernández is a 66 y.o. male who presents shortness of breath at the nursing home he was recently discharged from [facility] after respiratory failure from CHF at which time he had been intubated he has a history renal failure status post transplant he is also got history of CHF and COPD he is O2 dependent at 2 L  Patient does not give much history himself says he was just short of breath denies any pain other than from a sacral decubitus  Patient is on Eliquis according to his records  Assessment    Pt lying supine in bed upon entrance to room. Pt completed bed mobility with mod assistance. Pt sat at eob 5 minutes with good unsupported sitting balance with cga. Please refer to below assist levels for adl completion. Pt ed on OT POC, safety awareness tech, proper hand placement for transfers, and energy conservation tech with proper breathing tech with fair return. Pt requires verbal cuing for follow through of tasks and an extended time to complete tasks. Pt retired supine in bed with call light and phone in reach, bed alarm activated. All needs met upon exit. Performance deficits / Impairments: Decreased functional mobility ; Decreased safe awareness;Decreased balance;Decreased ADL status; Decreased posture;Decreased high-level IADLs;Decreased endurance  Treatment Diagnosis: SOB  Prognosis: Fair  Decision Making: Medium Complexity  OT Education: OT Role;Plan of Care  Patient Education: Pt ed on OT POC, safety awareness tech, proper hand placement for transfers, and energy conservation tech with proper breathing tech with fair return.   Barriers to Learning: Confusion and lethargy  REQUIRES OT FOLLOW UP: Yes  Activity Tolerance  Activity Tolerance: Patient limited by fatigue(pt with increased lethargy this am. Wanting to lay back down and go to sleep)  Safety Devices  Safety Devices in place: Yes  Type of devices: Bed alarm in place; Left in bed;Call light within reach  Restraints  Initially in place: No         Patient Diagnosis(es): The encounter diagnosis was COPD exacerbation ([facility] [address] has a past medical history of Blind one eye, Chronic kidney disease, Diabetes mellitus ([facility] [address] and Hypertension. has a past surgical history that includes Leg amputation below knee (Right, 08/2018); hernia repair; tumor excision; and Kidney transplant (N/A). Treatment Diagnosis: SOB      Restrictions  Restrictions/Precautions  Restrictions/Precautions: Fall Risk, Up as Tolerated  Required Braces or Orthoses?: No  Position Activity Restriction  Other position/activity restrictions: Up with assistance. Pt is has B LE BKA's.  BLE prostheses at home    Subjective   General  Patient assessed for rehabilitation services?: Yes  Family / Caregiver Present: No  Patient Currently in Pain: No  Pain Assessment  Pain Assessment: 0-10  Pain Level: 0  Vital Signs  Patient Currently in Pain: No  Oxygen Therapy  O2 Device: Nasal cannula  O2 Flow Rate (L/min): 6 L/min  Social/Functional History  Social/Functional History  Lives With: Spouse  Type of Home: House  Home Layout: Two level, Able to Live on Main level with bedroom/bathroom  Home Access: Stairs to enter without rails  Entrance Stairs - Number of Steps: 1 step to enter  Bathroom Shower/Tub: Tub/Shower unit, Curtain, Shower chair with back  [employer]: Handicap height  Bathroom Equipment: Shower chair  Home Equipment: 4 wheeled walker, Rolling walker, [address] Help From: Family  ADL Assistance: Needs assistance  Homemaking Assistance: Needs assistance  Homemaking Responsibilities: No(pt reports spouse completes all home management)  Ambulation Assistance: Needs assistance  Transfer Assistance: Needs assistance  Active : No  Patient's  Info: wife drives  Mode of Transportation: Car  Occupation: Retired  Leisure & Hobbies: watch tv  Additional Comments: pt reports having BLE prostheses at home     Objective   Vision: Impaired  Vision Exceptions: Wears glasses at all times  Hearing: Within functional limits    Orientation  Overall Orientation Status: Impaired  Orientation Level: Oriented to place;Oriented to person;Disoriented to time     Balance  Sitting Balance: Contact guard assistance(~5 min)  ADL  Feeding: NPO(for cardiac cath)  Grooming: Minimal assistance;Setup; Increased time to complete;Verbal cueing  UE Bathing: Minimal assistance;Setup; Increased time to complete;Verbal cueing(assist for back)  LE Bathing: Minimal assistance;Setup;Verbal cueing; Increased time to complete  UE Dressing: Minimal assistance;Setup;Verbal cueing; Increased time to complete  LE Dressing: Minimal assistance;Setup; Increased time to complete  Toileting: Minimal assistance(levine cath)  Tone RUE  RUE Tone: Normotonic  Tone LUE  LUE Tone: Normotonic  Coordination  Movements Are Fluid And Coordinated: Yes     Bed mobility  Rolling to Left: Moderate assistance  Supine to Sit: Moderate assistance  Sit to Supine: Moderate assistance  Scooting: Moderate assistance  Transfers  Transfer Comments: Pt with BLE BKA's with no prosthetics in hospital     Cognition  Overall Cognitive Status: Exceptions  Arousal/Alertness: Delayed responses to stimuli;Inconsistent responses to stimuli  Following Commands: Follows one step commands with increased time; Follows one step commands with repetition  Memory: Decreased recall of biographical Information  Safety Judgement: Decreased awareness of need for assistance  Problem Solving: Decreased awareness of errors  Insights: Decreased awareness of deficits  Initiation: Requires cues for some  Sequencing: Requires cues for some     Sensation  Overall Sensation Status: Impaired  Light Touch: Partial deficits in the RUE;Partial deficits in the LUE      LUE PROM (degrees)  LUE PROM: WFL  LUE AROM (degrees)  LUE AROM : WFL  Left Hand PROM (degrees)  Left Hand PROM: WFL  Left Hand AROM (degrees)  Left Hand AROM: WFL  RUE PROM (degrees)  RUE PROM: WFL  RUE AROM (degrees)  RUE AROM : WFL  Right Hand PROM (degrees)  Right Hand PROM: WFL  Right Hand AROM (degrees)  Right Hand AROM: WFL  LUE Strength  Gross LUE Strength: WFL  L Hand General: 4/5  LUE Strength Comment: 4/5 overall muscle strength  RUE Strength  R Hand General: 4/5  RUE Strength Comment: 4/5 overall muscle strength     Plan   Plan  Times per week: 3 x week       AM-PAC Score  AM-PAC Inpatient Daily Activity Raw Score: 19 (10/27/20 1121)  AM-PAC Inpatient ADL T-Scale Score : 40.22 (10/27/20 1121)  ADL Inpatient CMS 0-100% Score: 42.8 (10/27/20 1121)  ADL Inpatient CMS G-Code Modifier : CK (10/27/20 1121)    Goals  Short term goals  Time Frame for Short term goals: Pt will, by discharge:  Short term goal 1: Dem min a for adls  Short term goal 2: Dem cga for bed mobility  Short term goal 3: Sit on EOB with sba for 10 min  Short term goal 4: Dem good safety awareness tech for transfers with proper hand placement  Short term goal 5: Dem min a for bed to chair transfer (if BLE prostheses brought in or using sliding board)       Therapy Time   Individual Concurrent Group Co-treatment   Time In 0913         Time Out 0945         Minutes 32         Timed Code Treatment Minutes: [facility] [address], OTR/L